# Patient Record
Sex: FEMALE | Race: WHITE | NOT HISPANIC OR LATINO | Employment: OTHER | URBAN - METROPOLITAN AREA
[De-identification: names, ages, dates, MRNs, and addresses within clinical notes are randomized per-mention and may not be internally consistent; named-entity substitution may affect disease eponyms.]

---

## 2017-04-12 ENCOUNTER — OFFICE VISIT - RIVER FALLS (OUTPATIENT)
Dept: FAMILY MEDICINE | Facility: CLINIC | Age: 67
End: 2017-04-12

## 2017-04-12 ASSESSMENT — MIFFLIN-ST. JEOR: SCORE: 2062.73

## 2017-05-24 ENCOUNTER — OFFICE VISIT - RIVER FALLS (OUTPATIENT)
Dept: FAMILY MEDICINE | Facility: CLINIC | Age: 67
End: 2017-05-24

## 2017-05-24 ASSESSMENT — MIFFLIN-ST. JEOR: SCORE: 2027.35

## 2017-06-27 ENCOUNTER — OFFICE VISIT - RIVER FALLS (OUTPATIENT)
Dept: FAMILY MEDICINE | Facility: CLINIC | Age: 67
End: 2017-06-27

## 2017-06-27 ASSESSMENT — MIFFLIN-ST. JEOR: SCORE: 2037.32

## 2017-10-12 ENCOUNTER — OFFICE VISIT - RIVER FALLS (OUTPATIENT)
Dept: FAMILY MEDICINE | Facility: CLINIC | Age: 67
End: 2017-10-12

## 2017-10-12 ASSESSMENT — MIFFLIN-ST. JEOR: SCORE: 2040.95

## 2017-10-13 LAB
CREAT SERPL-MCNC: 0.73 MG/DL (ref 0.5–0.99)
GLUCOSE BLD-MCNC: 130 MG/DL (ref 65–99)
HBA1C MFR BLD: 6.2 %

## 2018-03-12 ENCOUNTER — OFFICE VISIT - RIVER FALLS (OUTPATIENT)
Dept: FAMILY MEDICINE | Facility: CLINIC | Age: 68
End: 2018-03-12

## 2018-03-12 ASSESSMENT — MIFFLIN-ST. JEOR: SCORE: 2040.95

## 2018-04-11 ENCOUNTER — OFFICE VISIT - RIVER FALLS (OUTPATIENT)
Dept: FAMILY MEDICINE | Facility: CLINIC | Age: 68
End: 2018-04-11

## 2018-04-11 ASSESSMENT — MIFFLIN-ST. JEOR: SCORE: 2065.45

## 2018-04-12 LAB
CHOLEST SERPL-MCNC: 177 MG/DL
CHOLEST/HDLC SERPL: 4 {RATIO}
CREAT SERPL-MCNC: 0.76 MG/DL (ref 0.5–0.99)
GLUCOSE BLD-MCNC: 148 MG/DL (ref 65–99)
HBA1C MFR BLD: 6.5 %
HDLC SERPL-MCNC: 44 MG/DL
LDLC SERPL CALC-MCNC: 101 MG/DL
NONHDLC SERPL-MCNC: 133 MG/DL
TRIGL SERPL-MCNC: 202 MG/DL

## 2018-10-29 ENCOUNTER — OFFICE VISIT - RIVER FALLS (OUTPATIENT)
Dept: FAMILY MEDICINE | Facility: CLINIC | Age: 68
End: 2018-10-29

## 2018-10-29 ASSESSMENT — MIFFLIN-ST. JEOR: SCORE: 2069.08

## 2018-10-30 LAB
CHOLEST SERPL-MCNC: 180 MG/DL
CHOLEST/HDLC SERPL: 4.3 {RATIO}
CREAT SERPL-MCNC: 0.72 MG/DL (ref 0.6–0.93)
GLUCOSE BLD-MCNC: 152 MG/DL (ref 65–99)
HBA1C MFR BLD: 6.6 %
HDLC SERPL-MCNC: 42 MG/DL
LDLC SERPL CALC-MCNC: 104 MG/DL
NONHDLC SERPL-MCNC: 138 MG/DL
TRIGL SERPL-MCNC: 226 MG/DL

## 2019-05-14 ENCOUNTER — COMMUNICATION - RIVER FALLS (OUTPATIENT)
Dept: FAMILY MEDICINE | Facility: CLINIC | Age: 69
End: 2019-05-14

## 2019-05-29 ENCOUNTER — OFFICE VISIT - RIVER FALLS (OUTPATIENT)
Dept: FAMILY MEDICINE | Facility: CLINIC | Age: 69
End: 2019-05-29

## 2019-05-29 ASSESSMENT — MIFFLIN-ST. JEOR: SCORE: 2091.76

## 2019-05-30 ENCOUNTER — COMMUNICATION - RIVER FALLS (OUTPATIENT)
Dept: FAMILY MEDICINE | Facility: CLINIC | Age: 69
End: 2019-05-30

## 2019-05-30 LAB
A/G RATIO - HISTORICAL: 1.6 (ref 1–2.5)
ALBUMIN SERPL-MCNC: 4.2 GM/DL (ref 3.6–5.1)
ALP SERPL-CCNC: 125 UNIT/L (ref 33–130)
ALT SERPL W P-5'-P-CCNC: 16 UNIT/L (ref 6–29)
AST SERPL W P-5'-P-CCNC: 19 UNIT/L (ref 10–35)
BILIRUB SERPL-MCNC: 0.7 MG/DL (ref 0.2–1.2)
BUN SERPL-MCNC: 15 MG/DL (ref 7–25)
BUN/CREAT RATIO - HISTORICAL: ABNORMAL (ref 6–22)
CALCIUM SERPL-MCNC: 9.4 MG/DL (ref 8.6–10.4)
CHLORIDE BLD-SCNC: 100 MMOL/L (ref 98–110)
CHOLEST SERPL-MCNC: 171 MG/DL
CHOLEST/HDLC SERPL: 3.7 {RATIO}
CO2 SERPL-SCNC: 28 MMOL/L (ref 20–32)
CREAT SERPL-MCNC: 0.71 MG/DL (ref 0.6–0.93)
EGFRCR SERPLBLD CKD-EPI 2021: 86 ML/MIN/1.73M2
ERYTHROCYTE [DISTWIDTH] IN BLOOD BY AUTOMATED COUNT: 12.3 % (ref 11–15)
GLOBULIN: 2.7 (ref 1.9–3.7)
GLUCOSE BLD-MCNC: 158 MG/DL (ref 65–99)
HBA1C MFR BLD: 7 %
HCT VFR BLD AUTO: 43.1 % (ref 35–45)
HDLC SERPL-MCNC: 46 MG/DL
HGB BLD-MCNC: 15 GM/DL (ref 11.7–15.5)
LDLC SERPL CALC-MCNC: 98 MG/DL
MCH RBC QN AUTO: 31.7 PG (ref 27–33)
MCHC RBC AUTO-ENTMCNC: 34.8 GM/DL (ref 32–36)
MCV RBC AUTO: 91.1 FL (ref 80–100)
NONHDLC SERPL-MCNC: 125 MG/DL
PLATELET # BLD AUTO: 272 10*3/UL (ref 140–400)
PMV BLD: 10 FL (ref 7.5–12.5)
POTASSIUM BLD-SCNC: 4.6 MMOL/L (ref 3.5–5.3)
PROT SERPL-MCNC: 6.9 GM/DL (ref 6.1–8.1)
RBC # BLD AUTO: 4.73 10*6/UL (ref 3.8–5.1)
SODIUM SERPL-SCNC: 139 MMOL/L (ref 135–146)
TRIGL SERPL-MCNC: 168 MG/DL
WBC # BLD AUTO: 6.6 10*3/UL (ref 3.8–10.8)

## 2019-05-31 LAB — BACTERIA SPEC CULT: NORMAL

## 2019-06-03 ENCOUNTER — COMMUNICATION - RIVER FALLS (OUTPATIENT)
Dept: FAMILY MEDICINE | Facility: CLINIC | Age: 69
End: 2019-06-03

## 2019-07-03 ENCOUNTER — COMMUNICATION - RIVER FALLS (OUTPATIENT)
Dept: FAMILY MEDICINE | Facility: CLINIC | Age: 69
End: 2019-07-03

## 2019-07-11 ENCOUNTER — OFFICE VISIT - RIVER FALLS (OUTPATIENT)
Dept: FAMILY MEDICINE | Facility: CLINIC | Age: 69
End: 2019-07-11

## 2019-07-11 ASSESSMENT — MIFFLIN-ST. JEOR: SCORE: 2091.76

## 2019-07-16 ENCOUNTER — COMMUNICATION - RIVER FALLS (OUTPATIENT)
Dept: FAMILY MEDICINE | Facility: CLINIC | Age: 69
End: 2019-07-16

## 2019-12-09 ENCOUNTER — COMMUNICATION - RIVER FALLS (OUTPATIENT)
Dept: FAMILY MEDICINE | Facility: CLINIC | Age: 69
End: 2019-12-09

## 2019-12-11 ENCOUNTER — OFFICE VISIT - RIVER FALLS (OUTPATIENT)
Dept: FAMILY MEDICINE | Facility: CLINIC | Age: 69
End: 2019-12-11

## 2019-12-11 ASSESSMENT — MIFFLIN-ST. JEOR: SCORE: 2091.76

## 2019-12-12 ENCOUNTER — COMMUNICATION - RIVER FALLS (OUTPATIENT)
Dept: FAMILY MEDICINE | Facility: CLINIC | Age: 69
End: 2019-12-12

## 2019-12-12 LAB
A/G RATIO - HISTORICAL: 1.6 (ref 1–2.5)
ALBUMIN SERPL-MCNC: 4.1 GM/DL (ref 3.6–5.1)
ALP SERPL-CCNC: 111 UNIT/L (ref 33–130)
ALT SERPL W P-5'-P-CCNC: 18 UNIT/L (ref 6–29)
AST SERPL W P-5'-P-CCNC: 17 UNIT/L (ref 10–35)
BILIRUB SERPL-MCNC: 0.7 MG/DL (ref 0.2–1.2)
BUN SERPL-MCNC: 13 MG/DL (ref 7–25)
BUN/CREAT RATIO - HISTORICAL: ABNORMAL (ref 6–22)
CALCIUM SERPL-MCNC: 9.6 MG/DL (ref 8.6–10.4)
CHLORIDE BLD-SCNC: 102 MMOL/L (ref 98–110)
CHOLEST SERPL-MCNC: 165 MG/DL
CHOLEST/HDLC SERPL: 3.9 {RATIO}
CO2 SERPL-SCNC: 30 MMOL/L (ref 20–32)
CREAT SERPL-MCNC: 0.69 MG/DL (ref 0.6–0.93)
EGFRCR SERPLBLD CKD-EPI 2021: 88 ML/MIN/1.73M2
ERYTHROCYTE [DISTWIDTH] IN BLOOD BY AUTOMATED COUNT: 11.9 % (ref 11–15)
GLOBULIN: 2.6 (ref 1.9–3.7)
GLUCOSE BLD-MCNC: 153 MG/DL (ref 65–99)
HBA1C MFR BLD: 6.7 %
HCT VFR BLD AUTO: 42.6 % (ref 35–45)
HDLC SERPL-MCNC: 42 MG/DL
HGB BLD-MCNC: 14.7 GM/DL (ref 11.7–15.5)
LDLC SERPL CALC-MCNC: 95 MG/DL
MCH RBC QN AUTO: 30.8 PG (ref 27–33)
MCHC RBC AUTO-ENTMCNC: 34.5 GM/DL (ref 32–36)
MCV RBC AUTO: 89.1 FL (ref 80–100)
NONHDLC SERPL-MCNC: 123 MG/DL
PLATELET # BLD AUTO: 286 10*3/UL (ref 140–400)
PMV BLD: 10.6 FL (ref 7.5–12.5)
POTASSIUM BLD-SCNC: 4.7 MMOL/L (ref 3.5–5.3)
PROT SERPL-MCNC: 6.7 GM/DL (ref 6.1–8.1)
RBC # BLD AUTO: 4.78 10*6/UL (ref 3.8–5.1)
SODIUM SERPL-SCNC: 138 MMOL/L (ref 135–146)
TRIGL SERPL-MCNC: 189 MG/DL
WBC # BLD AUTO: 6.4 10*3/UL (ref 3.8–10.8)

## 2020-07-09 ENCOUNTER — OFFICE VISIT - RIVER FALLS (OUTPATIENT)
Dept: FAMILY MEDICINE | Facility: CLINIC | Age: 70
End: 2020-07-09

## 2020-08-12 ENCOUNTER — OFFICE VISIT - RIVER FALLS (OUTPATIENT)
Dept: FAMILY MEDICINE | Facility: CLINIC | Age: 70
End: 2020-08-12

## 2021-01-19 ENCOUNTER — OFFICE VISIT - RIVER FALLS (OUTPATIENT)
Dept: FAMILY MEDICINE | Facility: CLINIC | Age: 71
End: 2021-01-19

## 2021-01-19 ASSESSMENT — MIFFLIN-ST. JEOR: SCORE: 2010.11

## 2021-01-20 ENCOUNTER — COMMUNICATION - RIVER FALLS (OUTPATIENT)
Dept: FAMILY MEDICINE | Facility: CLINIC | Age: 71
End: 2021-01-20

## 2021-01-20 LAB
BUN SERPL-MCNC: 15 MG/DL (ref 7–25)
BUN/CREAT RATIO - HISTORICAL: ABNORMAL (ref 6–22)
CALCIUM SERPL-MCNC: 9.3 MG/DL (ref 8.6–10.4)
CHLORIDE BLD-SCNC: 103 MMOL/L (ref 98–110)
CHOLEST SERPL-MCNC: 146 MG/DL
CHOLEST/HDLC SERPL: 3.3 {RATIO}
CO2 SERPL-SCNC: 27 MMOL/L (ref 20–32)
CREAT SERPL-MCNC: 0.68 MG/DL (ref 0.6–0.93)
CREAT UR-MCNC: 131 MG/DL (ref 20–275)
EGFRCR SERPLBLD CKD-EPI 2021: 87 ML/MIN/1.73M2
ERYTHROCYTE [DISTWIDTH] IN BLOOD BY AUTOMATED COUNT: 12 % (ref 11–15)
GLUCOSE BLD-MCNC: 136 MG/DL (ref 65–99)
HBA1C MFR BLD: 6.2 %
HCT VFR BLD AUTO: 42.4 % (ref 35–45)
HDLC SERPL-MCNC: 44 MG/DL
HGB BLD-MCNC: 14.5 GM/DL (ref 11.7–15.5)
LDLC SERPL CALC-MCNC: 74 MG/DL
MCH RBC QN AUTO: 31 PG (ref 27–33)
MCHC RBC AUTO-ENTMCNC: 34.2 GM/DL (ref 32–36)
MCV RBC AUTO: 90.6 FL (ref 80–100)
MICROALBUMIN UR-MCNC: 5.5 MG/DL
MICROALBUMIN/CREAT UR: 42 MG/G{CREAT}
NONHDLC SERPL-MCNC: 102 MG/DL
PLATELET # BLD AUTO: 319 10*3/UL (ref 140–400)
PMV BLD: 10.2 FL (ref 7.5–12.5)
POTASSIUM BLD-SCNC: 4.3 MMOL/L (ref 3.5–5.3)
RBC # BLD AUTO: 4.68 10*6/UL (ref 3.8–5.1)
SODIUM SERPL-SCNC: 139 MMOL/L (ref 135–146)
TRIGL SERPL-MCNC: 179 MG/DL
WBC # BLD AUTO: 8.8 10*3/UL (ref 3.8–10.8)

## 2021-05-06 ENCOUNTER — COMMUNICATION - RIVER FALLS (OUTPATIENT)
Dept: FAMILY MEDICINE | Facility: CLINIC | Age: 71
End: 2021-05-06

## 2021-08-11 ENCOUNTER — COMMUNICATION - RIVER FALLS (OUTPATIENT)
Dept: FAMILY MEDICINE | Facility: CLINIC | Age: 71
End: 2021-08-11

## 2021-08-16 ENCOUNTER — OFFICE VISIT - RIVER FALLS (OUTPATIENT)
Dept: FAMILY MEDICINE | Facility: CLINIC | Age: 71
End: 2021-08-16

## 2021-08-16 ASSESSMENT — MIFFLIN-ST. JEOR: SCORE: 2001.94

## 2021-08-17 ENCOUNTER — COMMUNICATION - RIVER FALLS (OUTPATIENT)
Dept: FAMILY MEDICINE | Facility: CLINIC | Age: 71
End: 2021-08-17

## 2021-08-17 LAB — HBA1C MFR BLD: 6.3 %

## 2021-08-30 ENCOUNTER — OFFICE VISIT - RIVER FALLS (OUTPATIENT)
Dept: FAMILY MEDICINE | Facility: CLINIC | Age: 71
End: 2021-08-30

## 2021-08-30 ASSESSMENT — MIFFLIN-ST. JEOR: SCORE: 2011.47

## 2021-08-31 ENCOUNTER — COMMUNICATION - RIVER FALLS (OUTPATIENT)
Dept: FAMILY MEDICINE | Facility: CLINIC | Age: 71
End: 2021-08-31

## 2021-08-31 LAB
TSH SERPL DL<=0.005 MIU/L-ACNC: 1.22 MIU/L (ref 0.4–4.5)
VIT B12 SERPL-MCNC: 202 PG/ML (ref 200–1100)

## 2021-09-02 ENCOUNTER — COMMUNICATION - RIVER FALLS (OUTPATIENT)
Dept: FAMILY MEDICINE | Facility: CLINIC | Age: 71
End: 2021-09-02

## 2022-02-11 VITALS
SYSTOLIC BLOOD PRESSURE: 128 MMHG | HEIGHT: 72 IN | OXYGEN SATURATION: 96 % | TEMPERATURE: 97.7 F | HEART RATE: 68 BPM | DIASTOLIC BLOOD PRESSURE: 78 MMHG | WEIGHT: 293 LBS | BODY MASS INDEX: 39.68 KG/M2

## 2022-02-11 VITALS
OXYGEN SATURATION: 96 % | BODY MASS INDEX: 39.68 KG/M2 | DIASTOLIC BLOOD PRESSURE: 74 MMHG | WEIGHT: 293 LBS | HEART RATE: 76 BPM | OXYGEN SATURATION: 97 % | WEIGHT: 293 LBS | SYSTOLIC BLOOD PRESSURE: 128 MMHG | HEART RATE: 71 BPM | SYSTOLIC BLOOD PRESSURE: 130 MMHG | HEIGHT: 72 IN | BODY MASS INDEX: 39.68 KG/M2 | HEIGHT: 72 IN | DIASTOLIC BLOOD PRESSURE: 82 MMHG

## 2022-02-11 VITALS
BODY MASS INDEX: 39.68 KG/M2 | DIASTOLIC BLOOD PRESSURE: 82 MMHG | HEIGHT: 72 IN | WEIGHT: 293 LBS | SYSTOLIC BLOOD PRESSURE: 144 MMHG | BODY MASS INDEX: 39.68 KG/M2 | WEIGHT: 293 LBS | DIASTOLIC BLOOD PRESSURE: 80 MMHG | HEART RATE: 76 BPM | HEIGHT: 72 IN | HEART RATE: 80 BPM | SYSTOLIC BLOOD PRESSURE: 138 MMHG

## 2022-02-11 VITALS
DIASTOLIC BLOOD PRESSURE: 62 MMHG | HEART RATE: 73 BPM | TEMPERATURE: 98.3 F | WEIGHT: 293 LBS | HEIGHT: 72 IN | OXYGEN SATURATION: 97 % | OXYGEN SATURATION: 94 % | SYSTOLIC BLOOD PRESSURE: 124 MMHG | DIASTOLIC BLOOD PRESSURE: 80 MMHG | BODY MASS INDEX: 39.68 KG/M2 | BODY MASS INDEX: 39.68 KG/M2 | SYSTOLIC BLOOD PRESSURE: 156 MMHG | HEART RATE: 59 BPM | TEMPERATURE: 97.1 F | WEIGHT: 293 LBS | HEIGHT: 72 IN

## 2022-02-11 VITALS
SYSTOLIC BLOOD PRESSURE: 124 MMHG | OXYGEN SATURATION: 97 % | HEIGHT: 72 IN | OXYGEN SATURATION: 97 % | DIASTOLIC BLOOD PRESSURE: 78 MMHG | WEIGHT: 293 LBS | HEIGHT: 72 IN | BODY MASS INDEX: 39.68 KG/M2 | DIASTOLIC BLOOD PRESSURE: 72 MMHG | HEART RATE: 76 BPM | HEART RATE: 72 BPM | WEIGHT: 293 LBS | SYSTOLIC BLOOD PRESSURE: 130 MMHG | BODY MASS INDEX: 39.68 KG/M2

## 2022-02-11 VITALS
WEIGHT: 293 LBS | HEART RATE: 67 BPM | OXYGEN SATURATION: 98 % | DIASTOLIC BLOOD PRESSURE: 80 MMHG | BODY MASS INDEX: 39.68 KG/M2 | SYSTOLIC BLOOD PRESSURE: 124 MMHG | HEIGHT: 72 IN

## 2022-02-11 VITALS
OXYGEN SATURATION: 94 % | BODY MASS INDEX: 39.68 KG/M2 | WEIGHT: 293 LBS | HEIGHT: 72 IN | HEART RATE: 91 BPM | SYSTOLIC BLOOD PRESSURE: 124 MMHG | DIASTOLIC BLOOD PRESSURE: 72 MMHG

## 2022-02-11 VITALS
HEART RATE: 64 BPM | HEIGHT: 72 IN | BODY MASS INDEX: 39.68 KG/M2 | WEIGHT: 293 LBS | RESPIRATION RATE: 16 BRPM | SYSTOLIC BLOOD PRESSURE: 130 MMHG | DIASTOLIC BLOOD PRESSURE: 64 MMHG | OXYGEN SATURATION: 95 % | TEMPERATURE: 97.9 F

## 2022-02-11 VITALS
SYSTOLIC BLOOD PRESSURE: 120 MMHG | WEIGHT: 293 LBS | DIASTOLIC BLOOD PRESSURE: 78 MMHG | HEART RATE: 81 BPM | BODY MASS INDEX: 39.68 KG/M2 | HEIGHT: 72 IN | OXYGEN SATURATION: 96 %

## 2022-02-11 VITALS
WEIGHT: 293 LBS | BODY MASS INDEX: 41.79 KG/M2 | SYSTOLIC BLOOD PRESSURE: 128 MMHG | DIASTOLIC BLOOD PRESSURE: 68 MMHG | HEART RATE: 80 BPM | TEMPERATURE: 98.1 F | OXYGEN SATURATION: 97 %

## 2022-02-16 NOTE — NURSING NOTE
Comprehensive Intake Entered On:  5/29/2019 9:28 AM CDT    Performed On:  5/29/2019 9:24 AM CDT by Ting Lopez CMA               Summary   Chief Complaint :   Pt here for med ck   Weight Measured :   326 lb(Converted to: 326 lb 0 oz, 147.87 kg)    Height Measured :   71.75 in(Converted to: 6 ft 0 in, 182.24 cm)    Body Mass Index :   44.52 kg/m2 (HI)    Body Surface Area :   2.73 m2   Systolic Blood Pressure :   124 mmHg   Diastolic Blood Pressure :   72 mmHg   Mean Arterial Pressure :   89 mmHg   Peripheral Pulse Rate :   72 bpm   Oxygen Saturation :   97 %   Ting Lopez CMA - 5/29/2019 9:24 AM CDT   Health Status   Allergies Verified? :   Yes   Medication History Verified? :   Yes   Medical History Verified? :   Yes   Pre-Visit Planning Status :   Completed   Tobacco Use? :   Former smoker   Ting Lopez CMA - 5/29/2019 9:24 AM CDT   Consents   Consent for Immunization Exchange :   Consent Granted   Consent for Immunizations to Providers :   Consent Granted   Ting Lopez CMA - 5/29/2019 9:24 AM CDT   Meds / Allergies   (As Of: 5/29/2019 9:28:32 AM CDT)   Allergies (Active)   No Known Medication Allergies  Estimated Onset Date:   Unspecified ; Created By:   Charlotte Loza CMA; Reaction Status:   Active ; Category:   Drug ; Substance:   No Known Medication Allergies ; Type:   Allergy ; Updated By:   Charlotte Loza CMA; Reviewed Date:   10/29/2018 10:29 AM CDT        Medication List   (As Of: 5/29/2019 9:28:32 AM CDT)   Prescription/Discharge Order    aspirin  :   aspirin ; Status:   Prescribed ; Ordered As Mnemonic:   aspirin 81 mg oral delayed release tablet ; Simple Display Line:   81 mg, 1 tab(s), PO, Daily, 90 tab(s), 1 Refill(s) ; Ordering Provider:   Raphael Molina MD; Catalog Code:   aspirin ; Order Dt/Tm:   10/29/2018 10:52:02 AM          atorvastatin  :   atorvastatin ; Status:   Prescribed ; Ordered As Mnemonic:   atorvastatin 20 mg oral tablet ; Simple Display Line:   20 mg, 1 tab(s), PO,  Daily, 90 tab(s), 1 Refill(s) ; Ordering Provider:   Raphael Molina MD; Catalog Code:   atorvastatin ; Order Dt/Tm:   10/29/2018 10:50:20 AM          celecoxib  :   celecoxib ; Status:   Prescribed ; Ordered As Mnemonic:   CeleBREX 200 mg oral capsule ; Simple Display Line:   200 mg, 1 cap(s), PO, Daily, 90 cap(s), 1 Refill(s) ; Ordering Provider:   Raphael Molina MD; Catalog Code:   celecoxib ; Order Dt/Tm:   10/29/2018 10:52:49 AM          citalopram  :   citalopram ; Status:   Prescribed ; Ordered As Mnemonic:   citalopram 40 mg oral tablet ; Simple Display Line:   40 mg, 1 tab(s), PO, Daily, 90 tab(s), 1 Refill(s) ; Ordering Provider:   Raphael Molina MD; Catalog Code:   citalopram ; Order Dt/Tm:   10/29/2018 10:50:35 AM          gabapentin  :   gabapentin ; Status:   Prescribed ; Ordered As Mnemonic:   gabapentin 100 mg oral capsule ; Simple Display Line:   100 mg, 1 cap(s), PO, bid, 180 cap(s), 1 Refill(s) ; Ordering Provider:   Raphael Molina MD; Catalog Code:   gabapentin ; Order Dt/Tm:   10/29/2018 10:50:02 AM          hydrochlorothiazide-losartan  :   hydrochlorothiazide-losartan ; Status:   Prescribed ; Ordered As Mnemonic:   hydrochlorothiazide-losartan 12.5 mg-100 mg oral tablet ; Simple Display Line:   1 tab(s), po, daily, 90 tab(s), 1 Refill(s) ; Ordering Provider:   Raphael Molina MD; Catalog Code:   hydrochlorothiazide-losartan ; Order Dt/Tm:   10/29/2018 10:51:07 AM          metFORMIN  :   metFORMIN ; Status:   Prescribed ; Ordered As Mnemonic:   metFORMIN 500 mg oral tablet ; Simple Display Line:   500 mg, 1 tab(s), PO, BID, 180 tab(s), 1 Refill(s) ; Ordering Provider:   Raphael Molina MD; Catalog Code:   metFORMIN ; Order Dt/Tm:   10/29/2018 10:50:49 AM          risperiDONE  :   risperiDONE ; Status:   Prescribed ; Ordered As Mnemonic:   risperiDONE 1 mg oral tablet ; Simple Display Line:   1.5 tab(s), Oral, daily, 45 EA, 0 Refill(s) ; Ordering Provider:   Raphael Molina MD; Catalog Code:    risperiDONE ; Order Dt/Tm:   5/14/2019 7:19:04 AM

## 2022-02-16 NOTE — LETTER
(Inserted Image. Unable to display)       [address] [place] [address]  September 02, 2021      JAN SHIPLEY      [address] [place] [address]        Dear JAN,    Thank you for selecting [facility] for your healthcare needs.  Below you will find the results of your recent tests done at our clinic.     No evidence for B-12 deficiency.      Result Name Current Result Reference Range   Vitamin B12 Level (pg/mL)  202 8/30/2021 200 - 1,100   TSH (mIU/L)  1.22 8/30/2021 0.40 - 4.50   Methylmalonic Acid (nmol/L)  307 8/30/2021 87 - 318       Please contact me or my assistant at [phone] if you have any questions.     Sincerely,        Jameel Faulkner MD

## 2022-02-16 NOTE — TELEPHONE ENCOUNTER
Entered by Ting Lopez CMA on April 08, 2019 12:22:20 PM CDT  ---------------------  From: Ting Lopez CMA   To: [facility]    Sent: 4/8/2019 12:22:20 PM CDT  Subject: Medication Management     ** Submitted: **  Order:risperiDONE (risperiDONE 1 mg oral tablet)  1.5 tab(s)  Oral  daily  Qty:  45 EA        Days Supply:  0        Refills:  0          Substitutions Allowed     Route To [facility]    Signed by Ting Lopez CMA  4/8/2019 12:22:00 PM    ** Submitted: **  Complete:risperiDONE (risperiDONE 1 mg oral tablet)   Signed by Ting Lopez CMA  4/8/2019 12:22:00 PM    ** Not Approved:  **  risperiDONE (RisperiDONE Tablet 1 MG)  Take 1&1/2 tablet by mouth daily  Qty:  45 EA        Days Supply:  0        Refills:  0          Substitutions Allowed     Route To [facility]   Signed by Ting Lopez CMA            ** Patient matched by Ting Lopez CMA on 4/8/2019 12:21:55 PM CDT **      ------------------------------------------  From: [facility]  To: Raphael Molina MD  Sent: April 8, 2019 11:22:00 AM CDT  Subject: Medication Management  Due: April 9, 2019 11:22:00 AM CDT    ** On Hold Pending Signature **  Drug: risperiDONE (risperiDONE 1 mg oral tablet)  Take 1&1/2 tablet by mouth daily  Quantity: 45 EA       Days Supply: 0         Refills: 0  Substitutions Allowed  Notes from Pharmacy:     Dispensed Drug: risperiDONE (risperiDONE 1 mg oral tablet)  Take 1&1/2 tablet by mouth daily  Quantity: 45 EA       Days Supply: 0         Refills: 0  Substitutions Allowed  Notes from Pharmacy:   ------------------------------------------

## 2022-02-16 NOTE — PROGRESS NOTES
Patient:   JAN SHIPLEY            MRN: [id number]            FIN: [id number]               Age:   68 years     Sex:  Female     :  1950   Associated Diagnoses:   Hypertension; Hyperlipidemia NOS; Diabetes Mellitus; Depression, major, recurrent, in complete remission; Primary osteoarthritis; Diabetic Neuropathy; Urinary incontinence   Author:   Raphael Molina MD      Impression and Plan   Diagnosis     Hypertension (GPM85-NV I10).     Course:  Well controlled.    Plan:  discontinue HCTZ due to urinary urge incontinence.    Orders     Orders   Charges (Evaluation and Management):  24981 office outpatient visit 25 minutes (Charge) (Order): Quantity: 1, Urinary incontinence  Depression, major, recurrent, in complete remission  Hypertension  Diabetes Mellitus  Diabetic Neuropathy.     Orders (Selected)   Prescriptions  Prescribed  losartan 100 mg oral tablet: = 1 tab(s) ( 100 mg ), PO, Daily, # 90 tab(s), 1 Refill(s), Type: Maintenance, Pharmacy: [facility], 1 tab(s) Oral daily.     Diagnosis     Hyperlipidemia NOS (CUQ54-AL E78.00).     Course:  Progressing as expected.    Orders     Orders (Selected)   Prescriptions  Prescribed  atorvastatin 20 mg oral tablet: = 1 tab(s) ( 20 mg ), PO, Daily, # 90 tab(s), 1 Refill(s), Type: Maintenance, Pharmacy: [facility], Needs to be seen for more refills., 1 tab(s) Oral daily.     Diagnosis     Diabetes Mellitus (OWC82-YI E11.9).     Course:  Well controlled.    Orders     Orders (Selected)   Prescriptions  Prescribed  metFORMIN 500 mg oral tablet: = 1 tab(s) ( 500 mg ), PO, BID, # 180 tab(s), 1 Refill(s), Type: Maintenance, Pharmacy: [facility], Needs to be seen for more refills., 1 tab(s) Oral bid.     Diagnosis     Depression, major, recurrent, in complete remission (TCU50-PY F33.42).     Course:  Progressing as expected.    Orders     Orders (Selected)   Prescriptions  Prescribed  citalopram 40 mg oral tablet: = 1 tab(s) ( 40 mg ), PO,  Daily, # 90 tab(s), 1 Refill(s), Type: Maintenance, Pharmacy: [facility], Needs to be seen for more refills., 1 tab(s) Oral daily  risperiDONE 1 mg oral tablet: = 1.5 tab(s), Oral, daily, # 135 EA, 1 Refill(s), Type: Maintenance, Pharmacy: [facility], Pt due this month for appt, 1.5 tab(s) Oral daily.     Diagnosis     Primary osteoarthritis (MBG35-AV M19.91).     Course:  Progressing as expected.    Orders     Orders (Selected)   Prescriptions  Prescribed  CeleBREX 200 mg oral capsule: = 1 cap(s) ( 200 mg ), PO, Daily, # 90 cap(s), 1 Refill(s), Type: Maintenance, Pharmacy: [facility], 1 cap(s) Oral daily.     Diagnosis     Diabetic Neuropathy (MNN55-YK E11.40).     Course:  Worsening.    Orders     Orders (Selected)   Prescriptions  Prescribed  gabapentin 100 mg oral capsule: = 2 cap(s) ( 200 mg ), PO, bid, # 360 cap(s), 1 Refill(s), Type: Maintenance, Pharmacy: [facility], Needs to be seen for more refills., 2 cap(s) Oral bid.     Diagnosis     Urinary incontinence (JBM61-DZ R32).     Course:  Worsening.    Plan:    1. Discontinue HCTZ  2. check urine culture  3. Consider pelvic floor exercises with PT  4. Urology consult.       Visit Information      Date of Service: 05/29/2019 09:20 am  Performing Location: [facility]  Encounter#: [id number]      Primary Care Provider (PCP):  Raphael Molina MD# [id number]      Referring Provider:  Raphael Molina MD# [id number]   Visit type:  New symptom.    Accompanied by:  No one.    Source of history:  Self.    History limitation:  None.       Chief Complaint   Chief complaint discussed and confirmed correct.     5/29/2019 9:24 AM CDT    Pt here for med ck        History of Present Illness             The patient presents for follow-up evaluation of diabetes.  The quality of the patient's diabetes is described as being unchanged from previous visit.  Exacerbating factors consist of noncompliance with diet and  sedentary lifestyle.  Relieving factors consist of medication.  Associated symptoms consist of none.  Medical encounters: none.  Compliance problems: with diet and with weight management.  Glucose results: within target range.  Hemoglobin A1c results: < 6%.  Lifestyle modification: weight reduction, diet, increased physical activity.  Medications: See medication list.  Additional pertinent history: last podiatric foot exam: 10/29/2018 and eye exam recommended.  Target A1c: 7.0    Target BS: 125      Fastin      Post prandial:150    Meter BS averages: NA    Hypoglycemia      Frequency:never      Severity:      Awareness:      Treatment:    Microvascular      Eye:neg      Kidney:neg      Neuro:peripheral neuropathy      Autonomic:neg       Macrovascular      CAD:neg      PVD:neg      HLD:pos      HTN:pos        Health Care Maintenance      Aspirin:yes      Pneumovax:yes       Flu vaccine:refused      Foot exam:yes      Tobacco:no    Diet History:SAD    Laboratory      Last A1c:6.5      Last LDL:89      Last creatinine0.73      Last MARIS: 6.               The patient presents for follow-up evaluation of hypertension.  The quality of hypertension symptom(s) since the patient's last visit is described as being unchanged.  The severity of the hypertension symptom(s) since the last visit is moderate.  Since the patient's last visit, the timing/course of hypertension symptom(s) is constant.  Exacerbating factors consist of none.  Relieving factors consist of medication.  Associated symptoms consist of none.  Prior treatment consists of lifestyle modification (weight reduction, dietary sodium restriction, increased physical activity).  Medical encounters: none.  Compliance problems: none.        Interval History   Cholesterol Management   Total cholesterol results < 200 mg/dL (optimal).  LDL cholesterol results < 100 mg/dL (optimal).  HDL cholesterol results 40-60 mg/dl.  Triglyceride results < 150 mg/dL (normal).   The course is progressing as expected.  Compliance problems: none.  The effect on daily activities is no change in activity level and no change in eating habits.  Associated symptoms characterized by no fatigue, chest pain, joint pain, muscle weakness or myalgias.        Review of Systems   Constitutional:  Negative.    Eye:  Negative.    Ear/Nose/Mouth/Throat:  Negative.    Respiratory:  Negative.    Cardiovascular:  Negative.    Gastrointestinal:  Fecal incontinence.    Genitourinary:  Urinary incontinence.    Hematology/Lymphatics:  Negative.    Endocrine:  Negative.    Immunologic:  Negative.    Musculoskeletal:  Joint pain.    Integumentary:  Negative.    Neurologic:  Numbness, Tingling.    Psychiatric:  Negative.    All other systems reviewed and negative      Health Status   Allergies:    Allergic Reactions (Selected)  No Known Medication Allergies   Medications:  (Selected)   Prescriptions  Prescribed  CeleBREX 200 mg oral capsule: = 1 cap(s) ( 200 mg ), PO, Daily, # 90 cap(s), 1 Refill(s), Type: Maintenance, Pharmacy: [facility], 1 cap(s) Oral daily  aspirin 81 mg oral delayed release tablet: = 1 tab(s) ( 81 mg ), PO, Daily, # 90 tab(s), 1 Refill(s), Type: Maintenance, Pharmacy: [facility], 1 tab(s) Oral daily  atorvastatin 20 mg oral tablet: = 1 tab(s) ( 20 mg ), PO, Daily, # 90 tab(s), 1 Refill(s), Type: Maintenance, Pharmacy: [facility], Needs to be seen for more refills., 1 tab(s) Oral daily  citalopram 40 mg oral tablet: = 1 tab(s) ( 40 mg ), PO, Daily, # 90 tab(s), 1 Refill(s), Type: Maintenance, Pharmacy: [facility], Needs to be seen for more refills., 1 tab(s) Oral daily  gabapentin 100 mg oral capsule: = 2 cap(s) ( 200 mg ), PO, bid, # 360 cap(s), 1 Refill(s), Type: Maintenance, Pharmacy: [facility], Needs to be seen for more refills., 2 cap(s) Oral bid  losartan 100 mg oral tablet: = 1 tab(s) ( 100 mg ), PO, Daily, # 90 tab(s), 1 Refill(s), Type:  Maintenance, Pharmacy: [facility], 1 tab(s) Oral daily  metFORMIN 500 mg oral tablet: = 1 tab(s) ( 500 mg ), PO, BID, # 180 tab(s), 1 Refill(s), Type: Maintenance, Pharmacy: [facility], Needs to be seen for more refills., 1 tab(s) Oral bid  risperiDONE 1 mg oral tablet: = 1.5 tab(s), Oral, daily, # 135 EA, 1 Refill(s), Type: Maintenance, Pharmacy: [facility], Pt due this month for appt, 1.5 tab(s) Oral daily   Problem list:    All Problems  Colon cancer / SNOMED CT 801999800 / Confirmed  Depression, major, recurrent, in complete remission / ICD-9-.36 / Confirmed  Diabetes Mellitus / ICD-9- / Confirmed  Diabetic Neuropathy / ICD-9-.60 / Confirmed  Hyperlipidemia NOS / SNOMED CT 085627885 / Confirmed  Hypertension / ICD-9-.91 / Confirmed  Long term current use of oral hypoglycemic drug / SNOMED CT 124494602 / Confirmed  Morbid Obesity / ICD-9-.01 / Probable  Primary osteoarthritis / SNOMED CT 387271738 / Confirmed      Histories   Past Medical History:    Active  Morbid Obesity (278.01)  Hyperlipidemia NOS (087022101)  Hypertension (997.91)  Diabetes Mellitus (250)  Depression, major, recurrent, in complete remission (296.36)  Diabetic Neuropathy (250.60)  Colon cancer (108194896)   Family History:    Diabetes mellitus  Father  Brother  Grandmother (P)  Hypertension  Grandmother (M)  Heart disease  Grandfather (P)  Father  Grandmother (M)  Substance abuse  Father     Procedure history:    Colonoscopy (SNOMED CT 227554085) performed by Leela Crooks MD on 5/13/2016 at 65 Years.  Comments:  5/26/2016 8:02 AM CDT - Moon Alicia  Indication:  Follow up previous lesion.  Sedation:  MAC  Impression:  Diverticulosis of lg int w/o perforation or abscess with bl.  Personal history of malignant neoplasm of large in.  Benign neoplasm of ascending colon.  Colonoscopy (SNOMED CT 428111768) performed by Leela Crooks MD on 10/23/2015 at 65 Years.  Comments:  11/12/2015 8:43 AM  Moon Cortes  Indication:  History of colon cancer.  Sedation: MAC  sigmoid colon resection in 2014 at 64 Years.  gall bladder removed.  tubes tied.  appendectomy.  benign lump from back of neck removed.   x 3.   Social History:        Alcohol Assessment: Denies Alcohol Use            Never      Tobacco Assessment: Past            Past      Substance Abuse Assessment            Never      Employment and Education Assessment            Retired      Home and Environment Assessment            Marital status: .  Lives with Self.  3 children.  Living situation: Home/Independent.      Physical Examination   Vital signs reviewed  and within acceptable limits    Vital Signs   2019 9:24 AM CDT Peripheral Pulse Rate 72 bpm    Systolic Blood Pressure 124 mmHg    Diastolic Blood Pressure 72 mmHg    Mean Arterial Pressure 89 mmHg    Oxygen Saturation 97 %      Measurements from flowsheet : Measurements   2019 9:24 AM CDT Height Measured - Standard 71.75 in    Weight Measured - Standard 326 lb    BSA 2.73 m2    Body Mass Index 44.52 kg/m2  HI      PHQ 9 score: 3   General:  Alert and oriented, No acute distress.    HENT:  Normocephalic.    Neck:  Supple, No lymphadenopathy, No thyromegaly.    Respiratory:  Lungs are clear to auscultation, Respirations are non-labored, Breath sounds are equal, Symmetrical chest wall expansion.    Cardiovascular:  Normal rate, Regular rhythm, No murmur, No gallop, Good pulses equal in all extremities, Normal peripheral perfusion, No edema.    Gastrointestinal:  Soft, Non-tender, Non-distended, Normal bowel sounds, No organomegaly.    Musculoskeletal:  Normal range of motion, No swelling, No deformity, Normal gait.    Integumentary:  Warm, Dry, Pink, No foot ulcers or skin lesions..    Feet:  Normal by visual exam, Sensation intact, By monofilament exam.    Neurologic:  Alert, Oriented, Normal sensory, Normal motor function, No focal deficits.    Psychiatric:   Cooperative, Appropriate mood & affect.

## 2022-02-16 NOTE — PROGRESS NOTES
Patient:   JAN SHIPLEY            MRN: [id number]            FIN: [id number]               Age:   67 years     Sex:  Female     :  1950   Associated Diagnoses:   Depression, major, recurrent, in complete remission; Diabetes Mellitus; Diabetic Neuropathy; Hyperlipidemia NOS; Hypertension   Author:   Raphael Hsu PA-C      Chief Complaint   10/12/2017 10:35 AM CDT  Pt here for med ck        History of Present Illness   Chief complaint and symptoms noted above and confirmed with patient   she has a hx of DM, hyperlipidemia and HTN  last HgbA1c in April was 6.8  lipids in April were well controlled on atrovastatin         Review of Systems   Constitutional:  Negative.    Ear/Nose/Mouth/Throat:  Negative.    Respiratory:  Negative.    Cardiovascular:  Negative.    Gastrointestinal:  Negative.    Musculoskeletal:  Joint pain.       Health Status   Allergies:    Allergic Reactions (Selected)  No Known Medication Allergies   Medications:  (Selected)   Prescriptions  Prescribed  aspirin 325 mg oral tablet: 1 tab(s) ( 325 mg ), po, daily, # 30 tab(s), 0 Refill(s), Type: Maintenance, Pharmacy: [facility], 1 tab(s) po daily  atorvastatin 20 mg oral tablet: 1 tab(s) ( 20 mg ), PO, Daily, # 90 tab(s), 1 Refill(s), Type: Maintenance, Pharmacy: [facility], 1 tab(s) po daily  citalopram 40 mg oral tablet: 1 tab(s) ( 40 mg ), PO, Daily, # 90 tab(s), 1 Refill(s), Type: Maintenance, Pharmacy: [facility], 1 tab(s) po daily  gabapentin 100 mg oral capsule: 1 cap(s) ( 100 mg ), PO, daily, # 90 cap(s), 1 Refill(s), Type: Maintenance, Pharmacy: [facility], 1 cap(s) po daily  hydroCHLOROthiazide-losartan 12.5 mg-100 mg oral tablet: 1 tab(s), po, daily, # 90 tab(s), 1 Refill(s), Type: Maintenance, Pharmacy: [facility], 1 tab(s) po daily  metFORMIN 500 mg oral tablet: 1 tab(s) ( 500 mg ), PO, BID, # 180 tab(s), 1 Refill(s), Type: Maintenance, Pharmacy: [facility], 1 tab(s) po  bid  risperiDONE 1 mg oral tablet: 1.5 tab(s) ( 1.5 mg ), po, daily, # 135 tab(s), 1 Refill(s), Type: Maintenance, Pharmacy: [facility], 1.5 tab(s) po daily  Documented Medications  Documented  vitamin D: vitamin D, Supply, 0 Refill(s), Type: Maintenance   Problem list:    All Problems  Diabetes Mellitus / ICD-9- / Confirmed  Diabetic Neuropathy / ICD-9-.60 / Confirmed  Morbid Obesity / ICD-9-.01 / Probable  Depression, major, recurrent, in complete remission / ICD-9-.36 / Confirmed  Hyperlipidemia NOS / SNOMED CT 179205083 / Confirmed  Colon cancer / SNOMED CT 271358254 / Confirmed  Hypertension / ICD-9-.91 / Confirmed      Histories   Past Medical History:    Active  Morbid Obesity (278.01)  Hyperlipidemia NOS (286436955)  Hypertension (997.91)  Diabetes Mellitus (250)  Depression, major, recurrent, in complete remission (296.36)  Diabetic Neuropathy (250.60)  Colon cancer (643678520)   Family History:    Diabetes mellitus  Father  Brother  Grandmother (P)  Hypertension  Grandmother (M)  Heart disease  Grandfather (P)  Father  Grandmother (M)  Substance abuse  Father     Procedure history:    Colonoscopy (210479979) on 2016 at 65 Years.  Comments:  2016 8:02 AM - Moon Alicia  Indication:  Follow up previous lesion.  Sedation:  MAC  Impression:  Diverticulosis of lg int w/o perforation or abscess with bl.  Personal history of malignant neoplasm of large in.  Benign neoplasm of ascending colon.  Colonoscopy ([id number]) on 10/23/2015 at 65 Years.  Comments:  2015 8:43 AM - Moon Alicia  Indication:  History of colon cancer.  Sedation: MAC  sigmoid colon resection in  at 64 Years.  gall bladder removed.  tubes tied.  appendectomy.  benign lump from back of neck removed.   x 3.   Social History:        Alcohol Assessment: Denies Alcohol Use            Never      Tobacco Assessment: Past            Past      Substance Abuse Assessment            Never       Employment and Education Assessment            Retired      Home and Environment Assessment            Marital status: .  Lives with Self.  3 children.  Living situation: Home/Independent.        Physical Examination   Vital Signs   10/12/2017 10:35 AM CDT Temperature Tympanic 97.7 DegF  LOW    Peripheral Pulse Rate 68 bpm    Pulse Site Radial artery    Systolic Blood Pressure 128 mmHg    Diastolic Blood Pressure 78 mmHg    Mean Arterial Pressure 95 mmHg    BP Site Right arm    Oxygen Saturation 96 %      Measurements from flowsheet : Measurements   10/12/2017 10:35 AM CDT Height Measured - Standard 71.75 in    Weight Measured - Standard 314.8 lb    BSA 2.69 m2    Body Mass Index 42.99 kg/m2      General:  No acute distress.    HENT:  Tympanic membranes are clear, No pharyngeal erythema, No sinus tenderness.    Neck:  Supple, Non-tender, No lymphadenopathy.    Respiratory:  Lungs are clear to auscultation.    Cardiovascular:  Normal rate, Regular rhythm, No murmur.    Feet:  Normal by visual exam, Normal pulses, Sensation intact, By monofilament exam.    Cognition and Speech:  Oriented, Speech clear and coherent.    Psychiatric:  Appropriate mood & affect.       Impression and Plan   Diagnosis     Depression, major, recurrent, in complete remission (LCP88-HY F33.42).     Diabetes Mellitus (MYM26-HU E11.9).     Diabetic Neuropathy (IVK06-RB E11.40).     Hyperlipidemia NOS (YJA48-DP E78.0).     Hypertension (PEJ65-WF I10).     Course:  Well controlled.    Summary:  will renew her meds and check labs,  probably also has some OA in her knees, advised to use tylenol prn knee pain  we will also increase her gabapentin to twice a day, in  the future may increase to tid if needed.    Orders     Orders   Pharmacy:  risperiDONE 1 mg oral tablet (Prescribe): 1.5 tab(s) ( 1.5 mg ), po, daily, # 135 tab(s), 1 Refill(s), Type: Maintenance, Pharmacy: [facility], 1.5 tab(s) po daily  hydrochlorothiazide-losartan 12.5  mg-100 mg oral tablet (Prescribe): 1 tab(s), po, daily, # 90 tab(s), 1 Refill(s), Type: Maintenance, Pharmacy: [facility], 1 tab(s) po daily  metFORMIN 500 mg oral tablet (Prescribe): 1 tab(s) ( 500 mg ), PO, BID, # 180 tab(s), 1 Refill(s), Type: Maintenance, Pharmacy: [facility], 1 tab(s) po bid  citalopram 40 mg oral tablet (Prescribe): 1 tab(s) ( 40 mg ), PO, Daily, # 90 tab(s), 1 Refill(s), Type: Maintenance, Pharmacy: [facility], 1 tab(s) po daily  atorvastatin 20 mg oral tablet (Prescribe): 1 tab(s) ( 20 mg ), PO, Daily, # 90 tab(s), 1 Refill(s), Type: Maintenance, Pharmacy: [facility], 1 tab(s) po daily  gabapentin 100 mg oral capsule (Prescribe): 1 cap(s) ( 100 mg ), PO, daily, # 90 cap(s), 1 Refill(s), Type: Maintenance, Pharmacy: [facility], 1 cap(s) po daily  hydroCHLOROthiazide-losartan 12.5 mg-100 mg oral tablet (Modify): 1 tab(s), po, daily, # 90 tab(s), 1 Refill(s), Type: Hard Stop, Pharmacy: [facility]  metFORMIN 500 mg oral tablet (Modify): 1 tab(s) ( 500 mg ), PO, BID, # 180 tab(s), 1 Refill(s), Type: Hard Stop, Pharmacy: [facility]  citalopram 40 mg oral tablet (Modify): 1 tab(s) ( 40 mg ), PO, Daily, # 90 tab(s), 1 Refill(s), Type: Hard Stop, Pharmacy: [facility]  atorvastatin 20 mg oral tablet (Modify): 1 tab(s) ( 20 mg ), PO, Daily, # 90 tab(s), 1 Refill(s), Type: Hard Stop, Pharmacy: [facility]  gabapentin 100 mg oral capsule (Modify): 1 cap(s) ( 100 mg ), PO, daily, # 90 cap(s), 1 Refill(s), Type: Hard Stop, Pharmacy: [facility]  risperiDONE 1 mg oral tablet (Modify): 1.5 tab(s) ( 1.5 mg ), po, daily, # 135 tab(s), 1 Refill(s), Type: Hard Stop, Pharmacy: [facility].     Orders   Lab (Gen Lab  Reference Lab):  Microalbumin, random urine (w/creatinine)* ([facility]) (Order): Specimen Type: Urine, Collection Date: 10/12/2017 11:06 AM CDT  Hemoglobin A1c* ([facility]) (Order): Specimen Type: Blood, Collection Date:  10/12/2017 11:06 AM CDT  Basic Metabolic Panel* ([facility]) (Order): Specimen Type: Serum, Collection Date: 10/12/2017 11:06 AM CDT.     Orders   Pharmacy:  gabapentin 100 mg oral capsule (Prescribe): 1 cap(s) ( 100 mg ), PO, bid, # 180 cap(s), 1 Refill(s), Type: Maintenance, Pharmacy: [facility], 1 cap(s) po bid  gabapentin 100 mg oral capsule (Discontinue).     Orders   Charges (Evaluation and Management):  63729 office outpatient visit 25 minutes (Charge) (Order): Quantity: 1, Diabetic Neuropathy  Hypertension  Diabetes Mellitus  Depression, major, recurrent, in complete remission  Hyperlipidemia NOS.

## 2022-02-16 NOTE — TELEPHONE ENCOUNTER
---------------------  From: Ting Lopez CMA   To: Raphael Molina MD;     Sent: 7/11/2019 1:33:10 PM CDT  Subject: General Message     Pt called back to say that the med that you had given her was Terbinafine Hcl

## 2022-02-16 NOTE — TELEPHONE ENCOUNTER
---------------------  From: Lizet HORTON Alexa (Phone Messages Pool ([id number]_[facility]))   To: Phone Messages Pool ([id number]_[place] [facility] [place]);     Sent: 7/9/2020 2:11:00 PM CDT  Subject: General Message-muscle relaxant     Phone Message    PCP:   seen by [facility]      Time of Call:  1339       Person Calling:  Lv from [facility]  Phone number:  [phone]    Returned call at: _    Note:   Pt had a video visit today with [facility] and was understanding that she would receive a muscle relaxant which wasn't sent in.  All her other meds were refilled, but not a muscle relaxant.    LM for pt to call back to discuss     In fact, after much conversation, it is revealed the pain started shortly after she quit all her meds. She ran out and just didn't bother to get refilled. She was confused about why Dr Molina wasnt in the clinic in [place] anymore.  She has not been on any HTN, DM or pain meds. She has osteoarthritis   She is having no SOB or chest pain, just doesn't feel good. She had also been on gabapentin for pain and 2 meds for depression that she quit abruptly     I asked her to get back on ALL her medication, I will send refills to pharmacy  I asked her to schedule lab only appt in 10 weeks then f/u video appt 2 days later  IF her back pain does not respond in the next week to restart of the Celebrex, she should come to clinic for further eval of other etiology and she is in agreement.      Last office visit and reason:  7/9contacted pt again at 1556 and discussed  went over restarting all her meds as previously ordered - [facility] renewed  not starting anything new, needs to f/u if still having issues in 2wks - pt expressed understanding

## 2022-02-16 NOTE — LETTER
(Inserted Image. Unable to display)            August 17, 2021      JAN SHIPLEY      [address] [place] [address]        Dear JAN,    Thank you for selecting [facility] for your healthcare needs.  Below you will find the results of the recent tests done at our clinic.      Your diabetes test result is excellent.  Continue your current medications. I sent refills to your pharmacy for 6 months.   Set up an appointment with Dr Adebayo Faulkner as we discussed for memory concerns.  Set up an appointment for physical therapy to help with your left shoulder.    Use Tylenol (acetaminophen 1000mg) twice a day for your arthritis in your right hip. Continue to use the Celebrex for daily control of arthritis pain.  See me in 3 months to follow up and make sure things are coming together.      Result Name Current Result Previous Result Reference Range   Hgb A1c ((H)) 6.3 8/16/2021 ((H)) 6.2 1/19/2021  - <5.7       Please contact me or my assistant at [phone] if you have any questions or concerns.     Sincerely,        CATALINO Dunne  Family Nurse Practitioner      What do your labs mean?  Below is a glossary of commonly ordered labs:  LDL   Bad Cholesterol   HDL   Good Cholesterol  AST/ALT   Liver Function   Cr/Creatinine   Kidney Function  Microalbumin   Kidney Function  BUN   Kidney Function  PSA   Prostate    TSH   Thyroid Hormone  HgbA1c   Diabetes Test   Hgb (Hemoglobin)   Red Blood Cells  WBC   White Blood Cell Count

## 2022-02-16 NOTE — LETTER
(Inserted Image. Unable to display)   [address] [address]  December 12, 2019      JAN SHIPLEY  [address]  [place], [place] [id number]        Dear JAN,     Thank you for selecting [facility] for your healthcare needs. Below you will find the results of the recent tests done at our clinic.        All results are within acceptable limits. No treatment changes are recommended at this time.      Result Name Current Result Previous Result Reference Range   Sodium Level (mmol/L)  138 12/11/2019  139 5/29/2019 135 - 146   Potassium Level (mmol/L)  4.7 12/11/2019  4.6 5/29/2019 3.5 - 5.3   Chloride Level (mmol/L)  102 12/11/2019  100 5/29/2019 98 - 110   CO2 Level (mmol/L)  30 12/11/2019  28 5/29/2019 20 - 32   Glucose Level (mg/dL) ((H)) 153 12/11/2019 ((H)) 158 5/29/2019 65 - 99   BUN (mg/dL)  13 12/11/2019  15 5/29/2019 7 - 25   Creatinine Level (mg/dL)  0.69 12/11/2019  0.71 5/29/2019 0.60 - 0.93   BUN/Creat Ratio  NOT APPLICABLE 12/11/2019  NOT APPLICABLE 5/29/2019 6 - 22   eGFR (mL/min/1.73m2)  88 12/11/2019  86 5/29/2019 > OR = 60 -    eGFR  (mL/min/1.73m2)  102 12/11/2019  100 5/29/2019 > OR = 60 -    Calcium Level (mg/dL)  9.6 12/11/2019  9.4 5/29/2019 8.6 - 10.4   Bilirubin Total (mg/dL)  0.7 12/11/2019  0.7 5/29/2019 0.2 - 1.2   Alkaline Phosphatase (unit/L)  111 12/11/2019  125 5/29/2019 33 - 130   AST/SGOT (unit/L)  17 12/11/2019  19 5/29/2019 10 - 35   ALT/SGPT (unit/L)  18 12/11/2019  16 5/29/2019 6 - 29   Protein Total (gm/dL)  6.7 12/11/2019  6.9 5/29/2019 6.1 - 8.1   Albumin Level (gm/dL)  4.1 12/11/2019  4.2 5/29/2019 3.6 - 5.1   Globulin  2.6 12/11/2019  2.7 5/29/2019 1.9 - 3.7   A/G Ratio  1.6 12/11/2019  1.6 5/29/2019 1.0 - 2.5   Hgb A1c ((H)) 6.7 12/11/2019 ((H)) 7.0 5/29/2019  - <5.7   Cholesterol (mg/dL)  165 12/11/2019  171 5/29/2019  - <200   Non-HDL Cholesterol  123 12/11/2019  125 5/29/2019  - <130   HDL (mg/dL) ((L)) 42  12/11/2019 ((L)) 46 5/29/2019 >50 -    Cholesterol/HDL Ratio  3.9 12/11/2019  3.7 5/29/2019  - <5.0   LDL  95 12/11/2019  98 5/29/2019    Triglyceride (mg/dL) ((H)) 189 12/11/2019 ((H)) 168 5/29/2019  - <150   WBC  6.4 12/11/2019  6.6 5/29/2019 3.8 - 10.8   RBC  4.78 12/11/2019  4.73 5/29/2019 3.80 - 5.10   Hgb (gm/dL)  14.7 12/11/2019  15.0 5/29/2019 11.7 - 15.5   Hct (%)  42.6 12/11/2019  43.1 5/29/2019 35.0 - 45.0   MCV (fL)  89.1 12/11/2019  91.1 5/29/2019 80.0 - 100.0   MCH (pg)  30.8 12/11/2019  31.7 5/29/2019 27.0 - 33.0   MCHC (gm/dL)  34.5 12/11/2019  34.8 5/29/2019 32.0 - 36.0   RDW (%)  11.9 12/11/2019  12.3 5/29/2019 11.0 - 15.0   Platelet  286 12/11/2019  272 5/29/2019 140 - 400   MPV (fL)  10.6 12/11/2019  10.0 5/29/2019 7.5 - 12.5       Please contact my practice at [phone]  if you have any questions or concerns.     Sincerely,        Raphael Molina MD          What do your labs mean?  Below is a glossary of commonly ordered labs:  LDL   Bad Cholesterol   HDL   Good Cholesterol  AST/ALT   Liver Function   Cr/Creatinine   Kidney Function  Microalbumin   Kidney Function  BUN   Kidney Function  PSA   Prostate    TSH   Thyroid Hormone  HgbA1c   Diabetes Test   Hgb (Hemoglobin)   Red Blood Cells

## 2022-02-16 NOTE — PROGRESS NOTES
Patient:   JAN SHIPLEY            MRN: [id number]            FIN: [id number]               Age:   67 years     Sex:  Female     :  1950   Associated Diagnoses:   Vertigo   Author:   Raphael Molina MD      Impression and Plan   Diagnosis     Vertigo (FBR17-SU R42).     Course:  transient and now resolved.    Plan:  need to rule out central vertigo due to vascular disease.    Orders     Orders   Charges (Evaluation and Management):  56362 office outpatient visit 25 minutes (Charge) (Order): Quantity: 1, Vertigo.     Orders (Selected)   Outpatient Orders  Ordered  US Carotid Bilateral (Request): Vertigo.        Visit Information      Date of Service: 2018 02:55 pm  Performing Location: [facility]  Encounter#: [id number]      Primary Care Provider (PCP):  Raphael Molina MD    NPI# [id number]      Referring Provider:  Raphael Molina MD# [id number]   Visit type:  New symptom.    Accompanied by:  No one.    Source of history:  Self.    History limitation:  None.       Chief Complaint   Chief complaint discussed and confirmed correct.     3/12/2018 3:21 PM CDT    Pt here for visual disturbance that happened on Friday        History of Present Illness             The patient presents with brief episode of vertigo and what sounds like nystagmus three days ago..        Review of Systems   Constitutional:  No fever, No weakness, No fatigue, No decreased activity.    Eye:  Recent visual problem, Discharge, Visual disturbances, No blurring, No double vision.    Ear/Nose/Mouth/Throat:       Decreased hearing: Bilaterally.    Respiratory:  No shortness of breath, No cough.    Cardiovascular:  No palpitations, No bradycardia, No tachycardia, No peripheral edema, No syncope.    Gastrointestinal:  Negative.    Genitourinary:  No dysuria.    Hematology/Lymphatics:  Negative.    Endocrine:  Negative.    Immunologic:  Negative.    Musculoskeletal:  Negative.    Integumentary:  Negative.     Neurologic:  Negative.    Psychiatric:  No anxiety, No depression.          All other systems reviewed and negative      Health Status   Allergies:    Allergic Reactions (Selected)  No Known Medication Allergies   Medications:  (Selected)   Prescriptions  Prescribed  aspirin 325 mg oral tablet: 1 tab(s) ( 325 mg ), po, daily, # 30 tab(s), 0 Refill(s), Type: Maintenance, Pharmacy: [facility], 1 tab(s) po daily  atorvastatin 20 mg oral tablet: 1 tab(s) ( 20 mg ), PO, Daily, # 90 tab(s), 1 Refill(s), Type: Maintenance, Pharmacy: [facility], 1 tab(s) po daily  citalopram 40 mg oral tablet: 1 tab(s) ( 40 mg ), PO, Daily, # 90 tab(s), 1 Refill(s), Type: Maintenance, Pharmacy: [facility], 1 tab(s) po daily  gabapentin 100 mg oral capsule: 1 cap(s) ( 100 mg ), PO, bid, # 180 cap(s), 1 Refill(s), Type: Maintenance, Pharmacy: [facility], 1 cap(s) po bid  hydrochlorothiazide-losartan 12.5 mg-100 mg oral tablet: 1 tab(s), po, daily, # 90 tab(s), 1 Refill(s), Type: Maintenance, Pharmacy: [facility], 1 tab(s) po daily  metFORMIN 500 mg oral tablet: 1 tab(s) ( 500 mg ), PO, BID, # 180 tab(s), 1 Refill(s), Type: Maintenance, Pharmacy: [facility], 1 tab(s) po bid  risperiDONE 1 mg oral tablet: 1.5 tab(s) ( 1.5 mg ), po, daily, # 135 tab(s), 1 Refill(s), Type: Maintenance, Pharmacy: [facility], 1.5 tab(s) po daily  Documented Medications  Documented  vitamin D: vitamin D, Supply, 0 Refill(s), Type: Maintenance   Problem list:    All Problems  Colon cancer / SNOMED CT 812584393 / Confirmed  Depression, major, recurrent, in complete remission / ICD-9-.36 / Confirmed  Diabetes Mellitus / ICD-9- / Confirmed  Diabetic Neuropathy / ICD-9-.60 / Confirmed  Hyperlipidemia NOS / SNOMED CT 011014126 / Confirmed  Hypertension / ICD-9-.91 / Confirmed  Morbid Obesity / ICD-9-.01 / Probable      Histories   Past Medical History:    Active  Morbid Obesity  (278.01)  Hyperlipidemia NOS (886785954)  Hypertension (997.91)  Diabetes Mellitus (250)  Depression, major, recurrent, in complete remission (296.36)  Diabetic Neuropathy (250.60)  Colon cancer (290118843)   Family History:    Diabetes mellitus  Father  Brother  Grandmother (P)  Hypertension  Grandmother (M)  Heart disease  Grandfather (P)  Father  Grandmother (M)  Substance abuse  Father     Procedure history:    Colonoscopy (SNOMED CT 592118193) performed by Leela Crooks MD on 2016 at 65 Years.  Comments:  2016 8:02 AM - Moon Alicia  Indication:  Follow up previous lesion.  Sedation:  MAC  Impression:  Diverticulosis of lg int w/o perforation or abscess with bl.  Personal history of malignant neoplasm of large in.  Benign neoplasm of ascending colon.  Colonoscopy (SNOMED CT 053736761) performed by Leela Crooks MD on 10/23/2015 at 65 Years.  Comments:  2015 8:43 AM - Moon Alicia  Indication:  History of colon cancer.  Sedation: MAC  sigmoid colon resection in  at 64 Years.  gall bladder removed.  tubes tied.  appendectomy.  benign lump from back of neck removed.   x 3.   Social History:        Alcohol Assessment: Denies Alcohol Use            Never      Tobacco Assessment: Past            Past      Substance Abuse Assessment            Never      Employment and Education Assessment            Retired      Home and Environment Assessment            Marital status: .  Lives with Self.  3 children.  Living situation: Home/Independent.        Physical Examination   Vital signs reviewed  and within acceptable limits    Vital Signs   3/12/2018 3:21 PM CDT Peripheral Pulse Rate 71 bpm    Systolic Blood Pressure 130 mmHg    Diastolic Blood Pressure 82 mmHg  HI    Mean Arterial Pressure 98 mmHg    BP Site Right arm    Oxygen Saturation 96 %      Measurements from flowsheet : Measurements   3/12/2018 3:21 PM CDT Height Measured - Standard 71.75 in    Weight Measured - Standard 314.8 lb     BSA 2.69 m2    Body Mass Index 42.99 kg/m2  HI      General:  No acute distress.    Neck:  Supple, No carotid bruit, No jugular venous distention, No lymphadenopathy, No thyromegaly.    Respiratory:  Lungs are clear to auscultation, Respirations are non-labored, Breath sounds are equal, Symmetrical chest wall expansion.    Cardiovascular:  Normal rate, Regular rhythm, No murmur, No gallop, Good pulses equal in all extremities, Normal peripheral perfusion, No edema.    Gastrointestinal:  Soft, Non-tender, Non-distended, Normal bowel sounds, No organomegaly.    Integumentary:  Warm, Dry, Pink.    Neurologic:  Alert, Oriented, Normal sensory, Normal motor function, No focal deficits, Cranial Nerves II-XII are grossly intact, Normal deep tendon reflexes.    Psychiatric:  Cooperative, Appropriate mood & affect, Normal judgment.

## 2022-02-16 NOTE — NURSING NOTE
CAGE Assessment Entered On:  5/29/2019 10:09 AM CDT    Performed On:  5/29/2019 10:09 AM CDT by Ting Lopez CMA               Assessment   Have you ever felt you should cut down on your drinking :   No   Have people annoyed you by criticizing your drinking :   No   Have you ever felt bad or guilty about your drinking :   No   Have you ever taken a drink first thing in the morning to steady your nerves or get rid of a hangover (Eye-opener) :   No   CAGE Score :   0    Ting Lopez CMA - 5/29/2019 10:09 AM CDT

## 2022-02-16 NOTE — TELEPHONE ENCOUNTER
Entered by Harshil Mejía CMA on May 06, 2021 12:07:03 PM CDT  ---------------------  From: Harshil Mejía CMA   To: [facility]    Sent: 5/6/2021 12:06:59 PM CDT  Subject: Medication Management     ** Not Approved: Patient has requested refill too soon, Pt given #90 and 1 refill 1/19/21 **  losartan (LOSARTAN POT 100MG)  ONE (1) TAB(S) ORAL DAILY  Qty:  30 tab(s)        Days Supply:  90        Refills:  0          Substitutions Allowed     Route To Pharmacy - [facility]   Note from Pharmacy:  * * N O T I C E * * Last quantity doesn't match original quantity  Signed by Harshil Mejía CMA            ** Patient matched by Harshil Mejía CMA on 5/6/2021 12:05:48 PM CDT **      ------------------------------------------  From: [facility]  To: Raphael Hsu PA-C  Sent: May 6, 2021 9:22:21 AM CDT  Subject: Medication Management  Due: April 27, 2021 12:41:43 AM CDT     ** On Hold Pending Signature **     Drug: losartan (losartan 100 mg oral tablet), ONE (1) TAB(S) ORAL DAILY  Quantity: 30 tab(s)  Days Supply: 90  Refills: 0  Substitutions Allowed  Notes from Pharmacy:     Dispensed Drug: losartan (losartan 100 mg oral tablet), ONE (1) TAB(S) ORAL DAILY  Quantity: 30 tab(s)  Days Supply: 90  Refills: 0  Substitutions Allowed  Notes from Pharmacy: * * N O T I C E * * Last quantity doesn t match original quantity  ------------------------------------------   operating room

## 2022-02-16 NOTE — PROGRESS NOTES
Patient:   JAN SHIPLEY            MRN: [id number]            FIN: [id number]               Age:   68 years     Sex:  Female     :  1950   Associated Diagnoses:   Hypertension; Hyperlipidemia NOS; Diabetes Mellitus; Depression, major, recurrent, in complete remission; Diabetic Neuropathy   Author:   Raphael Molina MD      Impression and Plan   Diagnosis     Hypertension (MKJ50-AF I10).     Course:  Well controlled.    Orders     Orders   Charges (Evaluation and Management):  61847 office outpatient visit 25 minutes (Charge) (Order): Quantity: 1, Diabetes Mellitus  Hyperlipidemia NOS  Hypertension.     Orders (Selected)   Prescriptions  Prescribed  hydrochlorothiazide-losartan 12.5 mg-100 mg oral tablet: 1 tab(s), po, daily, # 90 tab(s), 1 Refill(s), Type: Maintenance, Pharmacy: [facility], Needs to be seen for more refills., 1 tab(s) Oral daily.     Diagnosis     Hyperlipidemia NOS (KPI42-LX E78.00).     Course:  Progressing as expected.    Orders     Orders (Selected)   Prescriptions  Prescribed  atorvastatin 20 mg oral tablet: = 1 tab(s) ( 20 mg ), PO, Daily, # 90 tab(s), 1 Refill(s), Type: Maintenance, Pharmacy: [facility], Needs to be seen for more refills., 1 tab(s) Oral daily.     Diagnosis     Diabetes Mellitus (UFJ68-FX E11.9).     Course:  Well controlled.    Orders     Orders (Selected)   Prescriptions  Prescribed  metFORMIN 500 mg oral tablet: = 1 tab(s) ( 500 mg ), PO, BID, # 180 tab(s), 1 Refill(s), Type: Maintenance, Pharmacy: [facility], Needs to be seen for more refills., 1 tab(s) Oral bid.     Diagnosis     Depression, major, recurrent, in complete remission (YUB11-PB F33.42).     Course:  Progressing as expected.    Orders     Orders (Selected)   Prescriptions  Prescribed  citalopram 40 mg oral tablet: = 1 tab(s) ( 40 mg ), PO, Daily, # 90 tab(s), 1 Refill(s), Type: Maintenance, Pharmacy: [facility], Needs to be seen for more refills., 1 tab(s) Oral daily.      Diagnosis     Diabetic Neuropathy (ZBV66-KQ E11.40).     Course:  Progressing as expected.    Orders     Orders (Selected)   Prescriptions  Prescribed  gabapentin 100 mg oral capsule: = 1 cap(s) ( 100 mg ), PO, bid, # 180 cap(s), 1 Refill(s), Type: Maintenance, Pharmacy: [facility], Needs to be seen for more refills., 1 cap(s) Oral bid.        Visit Information      Date of Service: 10/29/2018 10:20 am  Performing Location: [facility]  Encounter#: [id number]      Primary Care Provider (PCP):  Raphael Molina MD# [id number]      Referring Provider:  Raphael Molina MD# [id number]   Visit type:  New symptom.    Accompanied by:  No one.    Source of history:  Self.    History limitation:  None.       Chief Complaint   Chief complaint discussed and confirmed correct.     10/29/2018 10:27 AM CDT  Pt here for med ck        History of Present Illness             The patient presents for follow-up evaluation of diabetes.  The quality of the patient's diabetes is described as being unchanged from previous visit.  Exacerbating factors consist of noncompliance with diet and sedentary lifestyle.  Relieving factors consist of medication.  Associated symptoms consist of none.  Medical encounters: none.  Compliance problems: with diet and with weight management.  Glucose results: within target range.  Hemoglobin A1c results: < 6%.  Lifestyle modification: weight reduction, diet, increased physical activity.  Medications: See medication list.  Additional pertinent history: last podiatric foot exam: 10/29/2018 and eye exam recommended.  Target A1c: 7.0    Target BS: 125      Fastin      Post prandial:150    Meter BS averages: NA    Hypoglycemia      Frequency:never      Severity:      Awareness:      Treatment:    Microvascular      Eye:neg      Kidney:neg      Neuro:peripheral neuropathy      Autonomic:neg       Macrovascular      CAD:neg      PVD:neg      HLD:pos      HTN:pos         Health Care Maintenance      Aspirin:yes      Pneumovax:yes 2010      Flu vaccine:refused      Foot exam:yes      Tobacco:no    Diet History:SAD    Laboratory      Last A1c:6.5      Last LDL:89      Last creatinine0.73      Last MARIS: 6.               The patient presents for follow-up evaluation of hypertension.  The quality of hypertension symptom(s) since the patient's last visit is described as being unchanged.  The severity of the hypertension symptom(s) since the last visit is moderate.  Since the patient's last visit, the timing/course of hypertension symptom(s) is constant.  Exacerbating factors consist of none.  Relieving factors consist of medication.  Associated symptoms consist of none.  Prior treatment consists of lifestyle modification (weight reduction, dietary sodium restriction, increased physical activity).  Medical encounters: none.  Compliance problems: none.        Interval History   Cholesterol Management   Total cholesterol results < 200 mg/dL (optimal).  LDL cholesterol results < 100 mg/dL (optimal).  HDL cholesterol results 40-60 mg/dl.  Triglyceride results < 150 mg/dL (normal).  The course is progressing as expected.  Compliance problems: none.  The effect on daily activities is no change in activity level and no change in eating habits.  Associated symptoms characterized by no fatigue, chest pain, joint pain, muscle weakness or myalgias.        Review of Systems   Constitutional:  Negative.    Eye:  Negative.    Ear/Nose/Mouth/Throat:  Negative.    Respiratory:  Negative.    Cardiovascular:  Negative.    Gastrointestinal:  Negative.    Genitourinary:  Negative.    Hematology/Lymphatics:  Negative.    Endocrine:  Negative.    Immunologic:  Negative.    Musculoskeletal:  Negative.    Integumentary:  Negative.    Neurologic:  Headache.    Psychiatric:  Negative.    All other systems reviewed and negative      Health Status   Allergies:    Allergic Reactions (Selected)  No Known Medication  Allergies   Medications:  (Selected)   Prescriptions  Prescribed  CeleBREX 200 mg oral capsule: = 1 cap(s) ( 200 mg ), PO, Daily, # 90 cap(s), 1 Refill(s), Type: Maintenance, Pharmacy: [facility], 1 cap(s) Oral daily  aspirin 81 mg oral delayed release tablet: = 1 tab(s) ( 81 mg ), PO, Daily, # 90 tab(s), 1 Refill(s), Type: Maintenance, Pharmacy: [facility], 1 tab(s) Oral daily  atorvastatin 20 mg oral tablet: = 1 tab(s) ( 20 mg ), PO, Daily, # 90 tab(s), 1 Refill(s), Type: Maintenance, Pharmacy: [facility], Needs to be seen for more refills., 1 tab(s) Oral daily  citalopram 40 mg oral tablet: = 1 tab(s) ( 40 mg ), PO, Daily, # 90 tab(s), 1 Refill(s), Type: Maintenance, Pharmacy: [facility], Needs to be seen for more refills., 1 tab(s) Oral daily  gabapentin 100 mg oral capsule: = 1 cap(s) ( 100 mg ), PO, bid, # 180 cap(s), 1 Refill(s), Type: Maintenance, Pharmacy: [facility], Needs to be seen for more refills., 1 cap(s) Oral bid  hydrochlorothiazide-losartan 12.5 mg-100 mg oral tablet: 1 tab(s), po, daily, # 90 tab(s), 1 Refill(s), Type: Maintenance, Pharmacy: [facility], Needs to be seen for more refills., 1 tab(s) Oral daily  metFORMIN 500 mg oral tablet: = 1 tab(s) ( 500 mg ), PO, BID, # 180 tab(s), 1 Refill(s), Type: Maintenance, Pharmacy: [facility], Needs to be seen for more refills., 1 tab(s) Oral bid   Problem list:    All Problems  Colon cancer / SNOMED CT 294924356 / Confirmed  Depression, major, recurrent, in complete remission / ICD-9-.36 / Confirmed  Diabetes Mellitus / ICD-9- / Confirmed  Diabetic Neuropathy / ICD-9-.60 / Confirmed  Hyperlipidemia NOS / SNOMED CT 034050570 / Confirmed  Hypertension / ICD-9-.91 / Confirmed  Long term current use of oral hypoglycemic drug / SNOMED CT 735381824 / Confirmed  Morbid Obesity / ICD-9-.01 / Probable      Histories   Past Medical History:    Active  Morbid Obesity  (278.01)  Hyperlipidemia NOS (728250144)  Hypertension (997.91)  Diabetes Mellitus (250)  Depression, major, recurrent, in complete remission (296.36)  Diabetic Neuropathy (250.60)  Colon cancer (111293012)   Family History:    Diabetes mellitus  Father  Brother  Grandmother (P)  Hypertension  Grandmother (M)  Heart disease  Grandfather (P)  Father  Grandmother (M)  Substance abuse  Father     Procedure history:    Colonoscopy (SNOMED CT 428636808) performed by Leela Crooks MD on 2016 at 65 Years.  Comments:  2016 8:02 AM - Moon Alicia  Indication:  Follow up previous lesion.  Sedation:  MAC  Impression:  Diverticulosis of lg int w/o perforation or abscess with bl.  Personal history of malignant neoplasm of large in.  Benign neoplasm of ascending colon.  Colonoscopy (SNOMED CT 853453839) performed by Leela Crooks MD on 10/23/2015 at 65 Years.  Comments:  2015 8:43 AM - Moon Alicia  Indication:  History of colon cancer.  Sedation: MAC  sigmoid colon resection in  at 64 Years.  gall bladder removed.  tubes tied.  appendectomy.  benign lump from back of neck removed.   x 3.   Social History:        Alcohol Assessment: Denies Alcohol Use            Never      Tobacco Assessment: Past            Past      Substance Abuse Assessment            Never      Employment and Education Assessment            Retired      Home and Environment Assessment            Marital status: .  Lives with Self.  3 children.  Living situation: Home/Independent.        Physical Examination   Vital signs reviewed  and within acceptable limits    Vital Signs   10/29/2018 10:27 AM CDT Peripheral Pulse Rate 81 bpm    Systolic Blood Pressure 120 mmHg    Diastolic Blood Pressure 78 mmHg    Mean Arterial Pressure 92 mmHg    BP Site Right arm    Oxygen Saturation 96 %      Measurements from flowsheet : Measurements   10/29/2018 10:27 AM CDT Height Measured - Standard 71.75 in    Weight Measured - Standard 321 lb     BSA 2.71 m2    Body Mass Index 43.83 kg/m2  HI      PHQ 9 score: 3   General:  Alert and oriented, No acute distress.    HENT:  Normocephalic.    Neck:  Supple, No lymphadenopathy, No thyromegaly.    Respiratory:  Lungs are clear to auscultation, Respirations are non-labored, Breath sounds are equal, Symmetrical chest wall expansion.    Cardiovascular:  Normal rate, Regular rhythm, No murmur, No gallop, Good pulses equal in all extremities, Normal peripheral perfusion, No edema.    Gastrointestinal:  Soft, Non-tender, Non-distended, Normal bowel sounds, No organomegaly.    Musculoskeletal:  Normal range of motion, No swelling, No deformity, Normal gait.    Integumentary:  Warm, Dry, Pink, No foot ulcers or skin lesions..    Feet:  Normal by visual exam, Sensation intact, By monofilament exam.    Neurologic:  Alert, Oriented, Normal sensory, Normal motor function, No focal deficits.    Psychiatric:  Cooperative, Appropriate mood & affect.

## 2022-02-16 NOTE — NURSING NOTE
Depression Screening Entered On:  5/29/2019 10:10 AM CDT    Performed On:  5/29/2019 10:09 AM CDT by Ting Lopez CMA               Depression Screening   Little Interest - Pleasure in Activities :   Nearly every day   Feeling Down, Depressed, Hopeless :   Several days   Initial Depression Screen Score :   4    Trouble Falling or Staying Asleep :   Not at all   Feeling Tired or Little Energy :   Nearly every day   Poor Appetite or Overeating :   More than half the days   Feeling Bad About Yourself :   Several days   Trouble Concentrating :   Several days   Moving or Speaking Slowly :   Several days   Thoughts Better Off Dead or Hurting Self :   Not at all   Detailed Depression Screen Score :   8    Total Depression Screen Score :   12    Ting Lopez CMA - 5/29/2019 10:09 AM CDT

## 2022-02-16 NOTE — NURSING NOTE
Depression Screening Entered On:  1/19/2021 10:44 AM CST    Performed On:  1/19/2021 10:43 AM CST by Harshil Mejía CMA               Depression Screening   Little Interest - Pleasure in Activities :   Several days   Feeling Down, Depressed, Hopeless :   Not at all   Initial Depression Screen Score :   1 Score   Poor Appetite or Overeating :   Several days   Trouble Falling or Staying Asleep :   Not at all   Feeling Tired or Little Energy :   Several days   Feeling Bad About Yourself :   Not at all   Trouble Concentrating :   Not at all   Moving or Speaking Slowly :   Not at all   Thoughts Better Off Dead or Hurting Self :   Not at all   LAURA Difficulty with Work, Home, Others :   Somewhat difficult   Detailed Depression Screen Score :   2    Total Depression Screen Score :   3    Harshil Mejía CMA - 1/19/2021 10:43 AM CST

## 2022-02-16 NOTE — LETTER
(Inserted Image. Unable to display)   November 23, 2021  JAN SHIPLEY  [address] [place] [address]        Dear JAN,    Thank you for selecting [facility] for your healthcare needs.    Our records indicate you are due for the following services:     Follow-up office visit      (FYI   Regarding office visits: In some instances, a video visit or telephone visit may be offered as an option.)    To schedule an appointment or if you have further questions, please contact your clinic at [phone].    Powered by [employer]    Sincerely,    CATALINO Dunne

## 2022-02-16 NOTE — NURSING NOTE
Comprehensive Intake Entered On:  7/9/2020 10:24 AM CDT    Performed On:  7/9/2020 10:13 AM CDT by Brissa Shelley               Summary   Chief Complaint :   c/o chronic Lt shoulder/arm pain and back pain. Verbal consent given for VIDEO visit- daughter Dominique's phone [phone]   Nasreen DORISBrissa BELTRAN - 7/9/2020 10:13 AM CDT   Health Status   Allergies Verified? :   Yes   Medication History Verified? :   Yes   Medical History Verified? :   Yes   Pre-Visit Planning Status :   Completed   Tobacco Use? :   Former smoker   Brissa Shelley - 7/9/2020 10:13 AM CDT   Consents   Consent for Immunization Exchange :   Consent Granted   Consent for Immunizations to Providers :   Consent Granted   Brissa Shelley - 7/9/2020 10:13 AM CDT   Meds / Allergies   (As Of: 7/9/2020 10:24:05 AM CDT)   Allergies (Active)   No Known Medication Allergies  Estimated Onset Date:   Unspecified ; Created By:   Charlotte Loza CMA; Reaction Status:   Active ; Category:   Drug ; Substance:   No Known Medication Allergies ; Type:   Allergy ; Updated By:   Charlotte Loza CMA; Reviewed Date:   12/11/2019 9:24 AM CST        Medication List   (As Of: 7/9/2020 10:24:05 AM CDT)   Prescription/Discharge Order    clobetasol topical  :   clobetasol topical ; Status:   Prescribed ; Ordered As Mnemonic:   clobetasol 0.05% topical cream ; Simple Display Line:   1 chichi, Topical, bid, 30 gm, 1 Refill(s) ; Ordering Provider:   Raphael Molina MD; Catalog Code:   clobetasol topical ; Order Dt/Tm:   12/11/2019 3:12:04 PM CST          atorvastatin  :   atorvastatin ; Status:   Prescribed ; Ordered As Mnemonic:   atorvastatin 20 mg oral tablet ; Simple Display Line:   20 mg, 1 tab(s), PO, Daily, 90 tab(s), 1 Refill(s) ; Ordering Provider:   Raphael Molina MD; Catalog Code:   atorvastatin ; Order Dt/Tm:   12/11/2019 9:43:35 AM CST          celecoxib  :   celecoxib ; Status:   Prescribed ; Ordered As Mnemonic:   CeleBREX 200 mg oral capsule ; Simple Display Line:    200 mg, 1 cap(s), PO, Daily, 90 cap(s), 1 Refill(s) ; Ordering Provider:   Raphael Molina MD; Catalog Code:   celecoxib ; Order Dt/Tm:   12/11/2019 9:43:45 AM CST          citalopram  :   citalopram ; Status:   Prescribed ; Ordered As Mnemonic:   citalopram 40 mg oral tablet ; Simple Display Line:   40 mg, 1 tab(s), PO, Daily, 90 tab(s), 1 Refill(s) ; Ordering Provider:   Raphael Molina MD; Catalog Code:   citalopram ; Order Dt/Tm:   12/11/2019 9:43:30 AM CST          gabapentin  :   gabapentin ; Status:   Prescribed ; Ordered As Mnemonic:   gabapentin 100 mg oral capsule ; Simple Display Line:   200 mg, 2 cap(s), PO, bid, 360 cap(s), 1 Refill(s) ; Ordering Provider:   Raphael Molina MD; Catalog Code:   gabapentin ; Order Dt/Tm:   12/11/2019 9:43:05 AM CST          losartan  :   losartan ; Status:   Prescribed ; Ordered As Mnemonic:   losartan 100 mg oral tablet ; Simple Display Line:   100 mg, 1 tab(s), PO, Daily, 90 tab(s), 1 Refill(s) ; Ordering Provider:   Raphael Molina MD; Catalog Code:   losartan ; Order Dt/Tm:   12/11/2019 9:43:40 AM CST          metFORMIN  :   metFORMIN ; Status:   Prescribed ; Ordered As Mnemonic:   metFORMIN 500 mg oral tablet ; Simple Display Line:   500 mg, 1 tab(s), PO, BID, 180 tab(s), 1 Refill(s) ; Ordering Provider:   Raphael Molina MD; Catalog Code:   metFORMIN ; Order Dt/Tm:   12/11/2019 9:43:25 AM CST          risperiDONE  :   risperiDONE ; Status:   Prescribed ; Ordered As Mnemonic:   risperiDONE 1 mg oral tablet ; Simple Display Line:   1.5 tab(s), Oral, daily, 135 EA, 1 Refill(s) ; Ordering Provider:   Raphael Molina MD; Catalog Code:   risperiDONE ; Order Dt/Tm:   12/11/2019 9:43:19 AM CST            ID Risk Screen   Recent Travel History :   No recent travel   Family Member Travel History :   No recent travel   Other Exposure to Infectious Disease :   Unknown   Escoe RMA, Brissa - 7/9/2020 10:13 AM CDT

## 2022-02-16 NOTE — PROGRESS NOTES
Patient:   JAN SHIPLEY            MRN: [id number]            FIN: [id number]               Age:   69 years     Sex:  Female     :  1950   Associated Diagnoses:   Skin lesion of right leg   Author:   Raphael Molina MD      Impression and Plan   Diagnosis     Skin lesion of right leg (IDD65-KC L98.9).     Orders      (Selected)   Outpatient Orders  Ordered (Dispatched)  Tissue pathology* ([facility]): Specimen Type: Miscellaneous Specimen, Collection Date: 19 11:17:00 CDT  Completed  [id number] punch bx skin single lesion (Charge): Quantity: 1, Skin lesion of right leg.        Visit Information      Date of Service: 2019 10:34 am  Performing Location: [facility]  Encounter#: [id number]      Primary Care Provider (PCP):  Raphael Molina MD    NPI# [id number]   Visit type:  New symptom.    Accompanied by:  No one.    Source of history:  Self.    History limitation:  None.       Procedure   Biopsy procedure   Date/ Time:  2019 11:19:00 AM.     Confirmed: patient, procedure, side, site, safety procedures followed.     Performed by: Raphael Molina MD.     Informed consent: signed by patient.     Indication: changing skin lesion, one lesion on right lateral lower leg and two lesions on the left leg.  No improvement with two weeks of Antifungal cream or two weeks of Steroid cream..     Preparation and technique: skin prepped (in usual sterile fashion, with alcohol), local anesthesia 1% lidocaine without epinephrine, technique (punch biopsy, 5 mm), hemostasis achieved (using sutures, one 3-0 Ethilon suture).     Site #  1: right, lateral, leg, size and depth 5 mm punch, specimen sent to pathology in preservative.     Completion: 1  lesions biopsied, estimated blood loss minimal, skin reapproximated sutured (using  3 -0, nylon, interrupted fashion).     Procedure tolerated: well.     suture removal 10 days.  wound care instructions given..

## 2022-02-16 NOTE — LETTER
(Inserted Image. Unable to display)   January 20, 2021      JAN SHIPLEY      [address] [place], [place] [id number]        Dear JAN,    Thank you for selecting [facility] for your healthcare needs.  Below you will find the results of you recent tests done at our clinic.     All of these results are acceptable.  Your diabetes is under good control.  Continue on current medications and recheck these labs in a year.      Result Name Current Result Previous Result Reference Range   Sodium Level (mmol/L)  139 1/19/2021  138 12/11/2019 135 - 146   Potassium Level (mmol/L)  4.3 1/19/2021  4.7 12/11/2019 3.5 - 5.3   Chloride Level (mmol/L)  103 1/19/2021  102 12/11/2019 98 - 110   CO2 Level (mmol/L)  27 1/19/2021  30 12/11/2019 20 - 32   Glucose Level (mg/dL) ((H)) 136 1/19/2021 ((H)) 153 12/11/2019 65 - 99   BUN (mg/dL)  15 1/19/2021  13 12/11/2019 7 - 25   Creatinine Level (mg/dL)  0.68 1/19/2021  0.69 12/11/2019 0.60 - 0.93   eGFR (mL/min/1.73m2)  87 1/19/2021  88 12/11/2019 > OR = 60 -    Calcium Level (mg/dL)  9.3 1/19/2021  9.6 12/11/2019 8.6 - 10.4   Hgb A1c ((H)) 6.2 1/19/2021 ((H)) 6.7 12/11/2019  - <5.7   Cholesterol (mg/dL)  146 1/19/2021  165 12/11/2019  - <200   Non-HDL Cholesterol  102 1/19/2021  123 12/11/2019  - <130   HDL (mg/dL) ((L)) 44 1/19/2021 ((L)) 42 12/11/2019 > OR = 50 -    Cholesterol/HDL Ratio  3.3 1/19/2021  3.9 12/11/2019  - <5.0   LDL  74 1/19/2021  95 12/11/2019    Triglyceride (mg/dL) ((H)) 179 1/19/2021 ((H)) 189 12/11/2019  - <150   U Creatinine (mg/dL)  131 1/19/2021  20 - 275   U Microalbumin (mg/dL)  5.5 1/19/2021  See Note: -    Ur Microalbumin/Creatinine Ratio ((H)) 42 1/19/2021   - <30   WBC  8.8 1/19/2021  6.4 12/11/2019 3.8 - 10.8   RBC  4.68 1/19/2021  4.78 12/11/2019 3.80 - 5.10   Hgb (gm/dL)  14.5 1/19/2021  14.7 12/11/2019 11.7 - 15.5   Hct (%)  42.4 1/19/2021  42.6 12/11/2019 35.0 - 45.0   MCV (fL)  90.6 1/19/2021  89.1 12/11/2019 80.0 - 100.0    MCH (pg)  31.0 1/19/2021  30.8 12/11/2019 27.0 - 33.0   MCHC (gm/dL)  34.2 1/19/2021  34.5 12/11/2019 32.0 - 36.0   RDW (%)  12.0 1/19/2021  11.9 12/11/2019 11.0 - 15.0   Platelet  319 1/19/2021  286 12/11/2019 140 - 400   MPV (fL)  10.2 1/19/2021  10.6 12/11/2019 7.5 - 12.5       Please contact me or my assistant at [phone] if you have any questions.     Sincerely,        Raphael Hsu PA-C    What do your labs mean?  Below is a glossary of commonly ordered labs:  LDL   Bad Cholesterol   HDL   Good Cholesterol  AST/ALT   Liver Function   Cr/Creatinine   Kidney Function  Microalbumin   Kidney Function  BUN   Kidney Function  PSA   Prostate    TSH   Thyroid Hormone  HgbA1c   Diabetes Test   Hgb (Hemoglobin)   Red Blood Cells

## 2022-02-16 NOTE — NURSING NOTE
CAGE Assessment Entered On:  1/19/2021 10:43 AM CST    Performed On:  1/19/2021 10:43 AM CST by Harshil Mejía CMA               Assessment   Have you ever felt you should cut down on your drinking :   No   Have people annoyed you by criticizing your drinking :   No   Have you ever felt bad or guilty about your drinking :   No   Have you ever taken a drink first thing in the morning to steady your nerves or get rid of a hangover (Eye-opener) :   No   CAGE Score :   0    Harshil Mejía CMA - 1/19/2021 10:43 AM CST

## 2022-02-16 NOTE — PROGRESS NOTES
Patient:   JAN SHIPLEY            MRN: [id number]            FIN: [id number]               Age:   70 years     Sex:  Female     :  1950   Associated Diagnoses:   Colon cancer; Depression, major, recurrent, in complete remission; Diabetes Mellitus; Diabetic Neuropathy; Hyperlipidemia NOS; Hypertension; Morbid Obesity; Primary osteoarthritis   Author:   Raphael Hsu PA-C      Chief Complaint   2021 10:44 AM CST   Pt here for a chronic disease visit and fasting labwork.  Pt also c/o diarrhea on and off x 1-2 years.      History of Present Illness   Chief complaint and symptoms noted above and confirmed with patient   she has a hx of DM, HTN, hyperlipidemia, diabetic neuropathy, OA, depression, obesity  here today for med refills and labs (last labs were in 2019)  she also has a hx of colon adenocarcinoma with a splenic flexure resection, last colonoscopy was in , due to repeat this year      Review of Systems   Constitutional:  Negative.    Ear/Nose/Mouth/Throat:  Negative.    Respiratory:  Negative.    Cardiovascular:  Negative.    Gastrointestinal:  Diarrhea.    Genitourinary:  Negative.    Neurologic   Psychiatric:  PHQ-9=  3.       Health Status   Allergies:    Allergic Reactions (Selected)  No Known Medication Allergies   Medications:  (Selected)   Prescriptions  Prescribed  CeleBREX 200 mg oral capsule: = 1 cap(s) ( 200 mg ), PO, Daily, # 30 cap(s), 0 Refill(s), Type: Maintenance, Pharmacy: [email or web address], 1 cap(s) Oral daily, 71.75, in, 19 9:20:00 CST, Height Measured, 306, lb, 20 10:37:00 CDT, Weight Measured  atorvastatin 20 mg oral tablet: = 1 tab(s) ( 20 mg ), Oral, daily, Instructions: *NEEDS LABS AND ANNUAL VISIT WITH DR. MOHAN PRIOR TO ADDITIONAL REFILLS*, # 30 tab(s), 0 Refill(s), Type: Maintenance, Pharmacy: [facility], 1 tab(s) Oral daily,Instr:*NEEDS LABS AND ANNUAL VI...  citalopram 40 mg oral tablet: = 1 tab(s) ( 40 mg ), PO, Daily, # 30 tab(s), 0  Refill(s), Type: Maintenance, Pharmacy: [facility], 1 tab(s) Oral daily, 71.75, in, 12/11/19 9:20:00 CST, Height Measured, 306, lb, 08/12/20 10:37:00 CDT, Weight Measured  clobetasol 0.05% topical cream: 1 chichi, Topical, bid, # 30 gm, 1 Refill(s), Type: Maintenance, Pharmacy: [facility], 1 chichi Topical bid  gabapentin 100 mg oral capsule: = 2 cap(s) ( 200 mg ), PO, bid, # 360 cap(s), 0 Refill(s), Type: Maintenance, Pharmacy: [facility], 2 cap(s) Oral bid, 71.75, in, 12/11/19 9:20:00 CST, Height Measured, 306, lb, 08/12/20 10:37:00 CDT, Weight Measured  losartan 100 mg oral tablet: = 1 tab(s) ( 100 mg ), PO, Daily, # 30 tab(s), 0 Refill(s), Type: Maintenance, Pharmacy: [facility], 1 tab(s) Oral daily, 71.75, in, 12/11/19 9:20:00 CST, Height Measured, 306, lb, 08/12/20 10:37:00 CDT, Weight Measured  metFORMIN 500 mg oral tablet: = 1 tab(s) ( 500 mg ), PO, BID, # 60 tab(s), 0 Refill(s), Type: Maintenance, Pharmacy: [facility], ., 1 tab(s) Oral bid, 71.75, in, 12/11/19 9:20:00 CST, Height Measured, 306, lb, 08/12/20 10:37:00 CDT, Weight Measured  risperiDONE 1 mg oral tablet: = 1.5 tab(s), Oral, daily, # 45 tab(s), 0 Refill(s), Type: Maintenance, Pharmacy: [facility], 1.5 tab(s) Oral daily, 71.75, in, 12/11/19 9:20:00 CST, Height Measured, 306, lb, 08/12/20 10:37:00 CDT, Weight Measured   Problem list:    All Problems  Diabetes Mellitus / ICD-9- / Confirmed  Diabetic Neuropathy / ICD-9-.60 / Confirmed  Long term current use of oral hypoglycemic drug / SNOMED CT 634705969 / Confirmed  Morbid Obesity / ICD-9-.01 / Probable  Depression, major, recurrent, in complete remission / ICD-9-.36 / Confirmed  Primary osteoarthritis / SNOMED CT 297347800 / Confirmed  Hyperlipidemia NOS / SNOMED CT 254603289 / Confirmed  Colon cancer / SNOMED CT 505102376 / Confirmed  Hypertension / ICD-9-.91 / Confirmed      Histories   Past Medical History:     Active  Morbid Obesity (278.01)  Hyperlipidemia NOS (469477713)  Hypertension (997.91)  Diabetes Mellitus (250)  Depression, major, recurrent, in complete remission (296.36)  Diabetic Neuropathy (250.60)  Colon cancer (219218400)   Family History:    Diabetes mellitus  Father  Brother  Grandmother (P)  Hypertension  Grandmother (M)  Heart disease  Grandfather (P)  Father  Grandmother (M)  Substance abuse  Father     Procedure history:    Colonoscopy ([id number]) on 2016 at 65 Years.  Comments:  2016 8:02 AM CDT - Moon Alicia  Indication:  Follow up previous lesion.  Sedation:  MAC  Impression:  Diverticulosis of lg int w/o perforation or abscess with bl.  Personal history of malignant neoplasm of large in.  Benign neoplasm of ascending colon.  Colonoscopy ([id number]) on 10/23/2015 at 65 Years.  Comments:  2015 8:43 AM CST - Moon Alicia  Indication:  History of colon cancer.  Sedation: MAC  sigmoid colon resection in  at 64 Years.  gall bladder removed.  tubes tied.  appendectomy.  benign lump from back of neck removed.   x 3.   Social History:        Electronic Cigarette/Vaping Assessment            Electronic Cigarette Use: Never.      Alcohol Assessment: Denies Alcohol Use            Never      Tobacco Assessment: Past            Former smoker, quit more than 30 days ago            Past      Substance Abuse Assessment            Never      Employment and Education Assessment            Retired      Home and Environment Assessment            Marital status: .  Lives with Self.  3 children.  Living situation: Home/Independent.        Physical Examination   Vital Signs   2021 10:44 AM CST Temperature Tympanic 97.9 DegF    Peripheral Pulse Rate 64 bpm    Pulse Site Radial artery    Respiratory Rate 16 br/min    Systolic Blood Pressure 130 mmHg    Diastolic Blood Pressure 64 mmHg    Mean Arterial Pressure 86 mmHg    BP Site Left upper leg    Oxygen Saturation 95 %       Measurements from flowsheet : Measurements   1/19/2021 10:44 AM CST Height Measured - Standard 71.75 in    Height/Length Estimated 71.75 in    Weight Measured - Standard 308 lb    BSA 2.66 m2    Body Mass Index 42.06 kg/m2  HI      General:  No acute distress.    Eye:  Pupils are equal, round and reactive to light, Extraocular movements are intact.    HENT:  Tympanic membranes are clear, No sinus tenderness.    Neck:  Supple, Non-tender, No lymphadenopathy.    Respiratory:  Lungs are clear to auscultation.    Cardiovascular:  Normal rate, Regular rhythm, No murmur.    Gastrointestinal:  Soft, Non-distended, Normal bowel sounds, No organomegaly, mild tenderness in right suprapubic region.    Musculoskeletal:  Normal range of motion.    Neurologic:  Cranial Nerves II-XII are grossly intact, Normal deep tendon reflexes.    Cognition and Speech:  Speech clear and coherent.    Psychiatric:  Appropriate mood & affect.       Impression and Plan   Diagnosis     Colon cancer (FRQ92-BZ C18.9).     Depression, major, recurrent, in complete remission (TYC32-IU F33.42).     Diabetes Mellitus (GVR10-BW E11.9).     Diabetic Neuropathy (ZHB92-CX E11.40).     Hyperlipidemia NOS (NCJ83-MW E78.00).     Hypertension (CGG85-MV I10).     Morbid Obesity (QIG94-HP E66.01).     Primary osteoarthritis (NKQ62-JF M19.91).     Summary:  all conditions are stable, will check labs today  will renew her meds for 6 months  advised to schedule a mammogram  she is also due for a repeat colonoscopy this year, but declines to schedule that today  will give tetanus booster  also advised to get eye exam this year.    Orders     Orders   Lab (Gen Lab  Reference Lab):  Lipid panel with reflex to direct ldl* ([facility]) (Order): Specimen Type: Serum, Collection Date: 1/19/2021 11:16 AM CST  Microalbumin, random urine (w/creatinine)* ([facility]) (Order): Specimen Type: Urine, Collection Date: 1/19/2021 11:16 AM CST  Hemoglobin A1c* ([facility]) (Order): Specimen Type:  Blood, Collection Date: 1/19/2021 11:16 AM CST  CBC (h/h, RBC, indices, WBC, Plt)* ([facility]) (Order): Specimen Type: Blood, Collection Date: 1/19/2021 11:16 AM CST  Basic Metabolic Panel* ([facility]) (Order): Specimen Type: Serum, Collection Date: 1/19/2021 11:16 AM CST.     Orders   Pharmacy:  gabapentin 100 mg oral capsule (Prescribe): = 2 cap(s) ( 200 mg ), PO, bid, # 360 cap(s), 1 Refill(s), Type: Maintenance, Pharmacy: [facility], 2 cap(s) Oral bid, 71.75, in, 1/19/2021 10:44 AM CST, Height Measured, 308, lb, 1/19/2021 10:44 AM CST, Weight Measured  atorvastatin 20 mg oral tablet (Prescribe): = 1 tab(s) ( 20 mg ), Oral, daily, # 90 tab(s), 1 Refill(s), Type: Maintenance, Pharmacy: [facility], 1 tab(s) Oral daily, 71.75, in, 1/19/2021 10:44 AM CST, Height Measured, 308, lb, 1/19/2021 10:44 AM CST, Weight Measured  losartan 100 mg oral tablet (Prescribe): = 1 tab(s) ( 100 mg ), PO, Daily, # 90 tab(s), 1 Refill(s), Type: Maintenance, Pharmacy: [facility], 1 tab(s) Oral daily, 71.75, in, 1/19/2021 10:44 AM CST, Height Measured, 308, lb, 1/19/2021 10:44 AM CST, Weight Measured  risperiDONE 1 mg oral tablet (Prescribe): = 1.5 tab(s), Oral, daily, # 135 tab(s), 1 Refill(s), Type: Maintenance, Pharmacy: [facility], 1.5 tab(s) Oral daily, 71.75, in, 1/19/2021 10:44 AM CST, Height Measured, 308, lb, 1/19/2021 10:44 AM CST, Weight Measured  metFORMIN 500 mg oral tablet (Prescribe): = 1 tab(s) ( 500 mg ), PO, BID, # 180 tab(s), 1 Refill(s), Type: Maintenance, Pharmacy: [facility], ., 1 tab(s) Oral bid, 71.75, in, 1/19/2021 10:44 AM CST, Height Measured, 308, lb, 1/19/2021 10:44 AM CST, Weight Measured  citalopram 40 mg oral tablet (Prescribe): = 1 tab(s) ( 40 mg ), PO, Daily, # 90 tab(s), 1 Refill(s), Type: Maintenance, Pharmacy: [facility], 1 tab(s) Oral daily, 71.75, in, 1/19/2021 10:44 AM CST, Height Measured, 308, lb, 1/19/2021 10:44 AM CST, Weight Measured  CeleBREX 200 mg  oral capsule (Prescribe): = 1 cap(s) ( 200 mg ), PO, Daily, # 90 cap(s), 3 Refill(s), Type: Maintenance, Pharmacy: [facility], 1 cap(s) Oral daily, 71.75, in, 1/19/2021 10:44 AM CST, Height Measured, 308, lb, 1/19/2021 10:44 AM CST, Weight Measured  gabapentin 100 mg oral capsule (Modify): = 2 cap(s) ( 200 mg ), PO, bid, # 360 cap(s), 0 Refill(s), Type: Hard Stop, Pharmacy: [facility], 71.75, in, 12/11/19 9:20:00 CST, Height Measured, 306, lb, 08/12/20 10:37:00 CDT, Weight Measured  risperiDONE 1 mg oral tablet (Modify): = 1.5 tab(s), Oral, daily, # 45 tab(s), 0 Refill(s), Type: Hard Stop, Pharmacy: [facility], 71.75, in, 12/11/19 9:20:00 CST, Height Measured, 306, lb, 08/12/20 10:37:00 CDT, Weight Measured  metFORMIN 500 mg oral tablet (Modify): = 1 tab(s) ( 500 mg ), PO, BID, # 60 tab(s), 0 Refill(s), Type: Hard Stop, Pharmacy: [facility], ., 71.75, in, 12/11/19 9:20:00 CST, Height Measured, 306, lb, 08/12/20 10:37:00 CDT, Weight Measured  citalopram 40 mg oral tablet (Modify): = 1 tab(s) ( 40 mg ), PO, Daily, # 30 tab(s), 0 Refill(s), Type: Hard Stop, Pharmacy: [facility], 71.75, in, 12/11/19 9:20:00 CST, Height Measured, 306, lb, 08/12/20 10:37:00 CDT, Weight Measured  CeleBREX 200 mg oral capsule (Modify): = 1 cap(s) ( 200 mg ), PO, Daily, # 30 cap(s), 0 Refill(s), Type: Hard Stop, Pharmacy: [facility], 71.75, in, 12/11/19 9:20:00 CST, Height Measured, 306, lb, 08/12/20 10:37:00 CDT, Weight Measured  losartan 100 mg oral tablet (Modify): = 1 tab(s) ( 100 mg ), PO, Daily, # 30 tab(s), 0 Refill(s), Type: Hard Stop, Pharmacy: [facility], 71.75, in, 12/11/19 9:20:00 CST, Height Measured, 306, lb, 08/12/20 10:37:00 CDT, Weight Measured  atorvastatin 20 mg oral tablet (Modify): = 1 tab(s) ( 20 mg ), Oral, daily, Instructions: *NEEDS LABS AND ANNUAL VISIT WITH DR. MOHAN PRIOR TO ADDITIONAL REFILLS*, # 30 tab(s), 0 Refill(s), Type: Hard Stop, Pharmacy: [facility], 71.75, in, 12/11/19 9:20:00 CST, Height Measured, 306, lb, 08/12/20 10:37:00 CDT, Weight Measured.     Orders   Charges (Evaluation and Management):  63098 office o/p est mod 30-39 min (Charge) (Order): Quantity: 1, Diabetic Neuropathy  Morbid Obesity  Depression, major, recurrent, in complete remission  Colon cancer  Diabetes Mellitus  Primary osteoarthritis  Hyperlipidemia NOS  Hypertension.

## 2022-02-16 NOTE — NURSING NOTE
Comprehensive Intake Entered On:  8/30/2021 9:14 AM CDT    Performed On:  8/30/2021 9:09 AM CDT by Deepika Mendoza               Summary   Chief Complaint :   Patient is here today to talk about memory loss.    Weight Measured :   308.3 lb(Converted to: 308 lb 5 oz, 139.843 kg)    Height Measured :   71.75 in(Converted to: 6 ft 0 in, 182.24 cm)    Body Mass Index :   42.1 kg/m2 (HI)    Body Surface Area :   2.66 m2   Height/Length Estimated :   71.75 in(Converted to: 6 ft 0 in, 182.24 cm)    Systolic Blood Pressure :   124 mmHg   Diastolic Blood Pressure :   62 mmHg   Mean Arterial Pressure :   83 mmHg   Peripheral Pulse Rate :   59 bpm (LOW)    BP Site :   Right arm   BP Method :   Electronic   HR Method :   Electronic   Temperature Tympanic :   97.1 DegF(Converted to: 36.2 DegC)  (LOW)    Oxygen Saturation :   97 %   Deepika Mendoza - 8/30/2021 9:09 AM CDT   Health Status   Allergies Verified? :   Yes   Medication History Verified? :   Yes   Medical History Verified? :   Yes   Pre-Visit Planning Status :   Completed   Tobacco Use? :   Former smoker   Deepika Mendoza - 8/30/2021 9:09 AM CDT   Consents   Consent for Immunization Exchange :   Consent Granted   Consent for Immunizations to Providers :   Consent Granted   Deepika Mendoza - 8/30/2021 9:09 AM CDT   Meds / Allergies   (As Of: 8/30/2021 9:14:36 AM CDT)   Allergies (Active)   No Known Medication Allergies  Estimated Onset Date:   Unspecified ; Created By:   Charlotte Loza CMA; Reaction Status:   Active ; Category:   Drug ; Substance:   No Known Medication Allergies ; Type:   Allergy ; Updated By:   Charlotte Loza CMA; Reviewed Date:   8/30/2021 9:11 AM CDT        Medication List   (As Of: 8/30/2021 9:14:36 AM CDT)   Prescription/Discharge Order    risperiDONE  :   risperiDONE ; Status:   Prescribed ; Ordered As Mnemonic:   risperiDONE 1 mg oral tablet ; Simple Display Line:   1.5 tab(s), Oral, daily, 135 tab(s), 3 Refill(s) ; Ordering Provider:    Ligia Arias; Catalog Code:   risperiDONE ; Order Dt/Tm:   8/16/2021 6:17:37 PM CDT          metFORMIN  :   metFORMIN ; Status:   Prescribed ; Ordered As Mnemonic:   metFORMIN 500 mg oral tablet ; Simple Display Line:   See Instructions, ONE (1) TAB(S) ORAL TWICE DAILY, 180 tab(s), 1 Refill(s) ; Ordering Provider:   Ligia Arias; Catalog Code:   metFORMIN ; Order Dt/Tm:   8/16/2021 6:15:56 PM CDT          losartan  :   losartan ; Status:   Prescribed ; Ordered As Mnemonic:   losartan 100 mg oral tablet ; Simple Display Line:   See Instructions, ONE (1) TAB(S) ORAL DAILY, 90 EA, 1 Refill(s) ; Ordering Provider:   Ligia Arias; Catalog Code:   losartan ; Order Dt/Tm:   8/16/2021 6:16:37 PM CDT          gabapentin  :   gabapentin ; Status:   Prescribed ; Ordered As Mnemonic:   gabapentin 100 mg oral capsule ; Simple Display Line:   200 mg, 2 cap(s), PO, bid, 360 cap(s), 3 Refill(s) ; Ordering Provider:   Ligia Arias; Catalog Code:   gabapentin ; Order Dt/Tm:   8/16/2021 6:17:22 PM CDT          citalopram  :   citalopram ; Status:   Prescribed ; Ordered As Mnemonic:   citalopram 40 mg oral tablet ; Simple Display Line:   See Instructions, ONE (1) TAB(S) ORAL DAILY, 90 EA, 3 Refill(s) ; Ordering Provider:   Ligia Arias; Catalog Code:   citalopram ; Order Dt/Tm:   8/16/2021 6:16:23 PM CDT          celecoxib  :   celecoxib ; Status:   Prescribed ; Ordered As Mnemonic:   CeleBREX 200 mg oral capsule ; Simple Display Line:   200 mg, 1 cap(s), PO, Daily, 90 cap(s), 3 Refill(s) ; Ordering Provider:   Ligia Arias; Catalog Code:   celecoxib ; Order Dt/Tm:   8/16/2021 6:17:53 PM CDT          atorvastatin  :   atorvastatin ; Status:   Prescribed ; Ordered As Mnemonic:   atorvastatin 20 mg oral tablet ; Simple Display Line:   See Instructions, TAKE ONE (1) TABLET DAILY, 90 EA, 0 Refill(s) ; Ordering Provider:   Ligia Arias; Catalog Code:   atorvastatin ; Order Dt/Tm:   8/16/2021 6:16:55  PM CDT

## 2022-02-16 NOTE — NURSING NOTE
Comprehensive Intake Entered On:  12/11/2019 9:24 AM CST    Performed On:  12/11/2019 9:20 AM CST by Ting Lopez CMA               Summary   Chief Complaint :   Pt here for med ck   Weight Measured :   326 lb(Converted to: 326 lb 0 oz, 147.87 kg)    Height Measured :   71.75 in(Converted to: 6 ft 0 in, 182.24 cm)    Body Mass Index :   44.52 kg/m2 (HI)    Body Surface Area :   2.73 m2   Systolic Blood Pressure :   124 mmHg   Diastolic Blood Pressure :   80 mmHg   Mean Arterial Pressure :   95 mmHg   Peripheral Pulse Rate :   67 bpm   Oxygen Saturation :   98 %   Ting Lopez CMA - 12/11/2019 9:20 AM CST   Health Status   Allergies Verified? :   Yes   Medication History Verified? :   Yes   Medical History Verified? :   Yes   Pre-Visit Planning Status :   Completed   Tobacco Use? :   Former smoker   Ting Lopez CMA - 12/11/2019 9:20 AM CST   Consents   Consent for Immunization Exchange :   Consent Granted   Consent for Immunizations to Providers :   Consent Granted   Ting Lopez CMA - 12/11/2019 9:20 AM CST   Meds / Allergies   (As Of: 12/11/2019 9:24:14 AM CST)   Allergies (Active)   No Known Medication Allergies  Estimated Onset Date:   Unspecified ; Created By:   Charlotte Loza CMA; Reaction Status:   Active ; Category:   Drug ; Substance:   No Known Medication Allergies ; Type:   Allergy ; Updated By:   Charlotte Loza CMA; Reviewed Date:   12/11/2019 9:24 AM CST        Medication List   (As Of: 12/11/2019 9:24:14 AM CST)   Prescription/Discharge Order    aspirin  :   aspirin ; Status:   Processing ; Ordered As Mnemonic:   aspirin 81 mg oral delayed release tablet ; Ordering Provider:   Raphael Molina MD; Action Display:   Complete ; Catalog Code:   aspirin ; Order Dt/Tm:   12/11/2019 9:23:58 AM CST          atorvastatin  :   atorvastatin ; Status:   Prescribed ; Ordered As Mnemonic:   atorvastatin 20 mg oral tablet ; Simple Display Line:   20 mg, 1 tab(s), PO, Daily, 90 tab(s), 1 Refill(s) ;  Ordering Provider:   Raphael Molina MD; Catalog Code:   atorvastatin ; Order Dt/Tm:   5/29/2019 9:53:21 AM CDT          celecoxib  :   celecoxib ; Status:   Prescribed ; Ordered As Mnemonic:   CeleBREX 200 mg oral capsule ; Simple Display Line:   200 mg, 1 cap(s), PO, Daily, 90 cap(s), 1 Refill(s) ; Ordering Provider:   Raphael Molina MD; Catalog Code:   celecoxib ; Order Dt/Tm:   5/29/2019 9:53:09 AM CDT          citalopram  :   citalopram ; Status:   Prescribed ; Ordered As Mnemonic:   citalopram 40 mg oral tablet ; Simple Display Line:   40 mg, 1 tab(s), PO, Daily, 90 tab(s), 1 Refill(s) ; Ordering Provider:   Raphael Molina MD; Catalog Code:   citalopram ; Order Dt/Tm:   5/29/2019 9:53:27 AM CDT          clobetasol topical  :   clobetasol topical ; Status:   Prescribed ; Ordered As Mnemonic:   clobetasol 0.05% topical cream ; Simple Display Line:   1 chichi, Topical, bid, 30 gm, 1 Refill(s) ; Ordering Provider:   Raphael Molina MD; Catalog Code:   clobetasol topical ; Order Dt/Tm:   11/19/2019 1:39:55 PM CST          gabapentin  :   gabapentin ; Status:   Prescribed ; Ordered As Mnemonic:   gabapentin 100 mg oral capsule ; Simple Display Line:   200 mg, 2 cap(s), PO, bid, 360 cap(s), 1 Refill(s) ; Ordering Provider:   Raphael Molina MD; Catalog Code:   gabapentin ; Order Dt/Tm:   5/29/2019 9:52:50 AM CDT          losartan  :   losartan ; Status:   Prescribed ; Ordered As Mnemonic:   losartan 100 mg oral tablet ; Simple Display Line:   100 mg, 1 tab(s), PO, Daily, 90 tab(s), 1 Refill(s) ; Ordering Provider:   Raphael Molina MD; Catalog Code:   losartan ; Order Dt/Tm:   5/29/2019 9:51:09 AM CDT          metFORMIN  :   metFORMIN ; Status:   Prescribed ; Ordered As Mnemonic:   metFORMIN 500 mg oral tablet ; Simple Display Line:   500 mg, 1 tab(s), PO, BID, 180 tab(s), 1 Refill(s) ; Ordering Provider:   Raphael Molina MD; Catalog Code:   metFORMIN ; Order Dt/Tm:   5/29/2019 9:53:34 AM CDT          risperiDONE  :    risperiDONE ; Status:   Prescribed ; Ordered As Mnemonic:   risperiDONE 1 mg oral tablet ; Simple Display Line:   1.5 tab(s), Oral, daily, 135 EA, 1 Refill(s) ; Ordering Provider:   Raphael Molina MD; Catalog Code:   risperiDONE ; Order Dt/Tm:   5/29/2019 9:53:41 AM CDT

## 2022-02-16 NOTE — TELEPHONE ENCOUNTER
Entered by Ting Lopez CMA on March 12, 2019 7:37:13 AM CDT  ---------------------  From: Ting Lopez CMA   To: [facility]    Sent: 3/12/2019 7:37:13 AM CDT  Subject: Medication Management     ** Submitted: **  Order:risperiDONE (risperiDONE 1 mg oral tablet)  1.5 tab(s)  Oral  daily  Qty:  45 EA        Days Supply:  0        Refills:  0          Substitutions Allowed     Route To [facility]    Signed by Ting Lopez CMA  3/12/2019 7:36:00 AM    ** Submitted: **  Complete:risperiDONE (risperiDONE 1 mg oral tablet)   Signed by Ting Lopez CMA  3/12/2019 7:37:00 AM    ** Not Approved:  **  risperiDONE (RisperiDONE Tablet 1 MG)  Take 1&1/2 tablet by mouth daily  Qty:  45 EA        Days Supply:  0        Refills:  0          Substitutions Allowed     Route To [facility]   Signed by Ting Lopez CMA            ** Patient matched by Ting Lopez CMA on 3/12/2019 7:36:36 AM CDT **      ------------------------------------------  From: [facility]  To: Raphael Molina MD  Sent: March 11, 2019 5:18:58 PM CDT  Subject: Medication Management  Due: March 12, 2019 5:18:58 PM CDT    ** On Hold Pending Signature **  Drug: risperiDONE (risperiDONE 1 mg oral tablet)  Take 1&1/2 tablet by mouth daily  Quantity: 45 EA       Days Supply: 0         Refills: 0  Substitutions Allowed  Notes from Pharmacy:     Dispensed Drug: risperiDONE (risperiDONE 1 mg oral tablet)  Take 1&1/2 tablet by mouth daily  Quantity: 45 EA       Days Supply: 0         Refills: 0  Substitutions Allowed  Notes from Pharmacy:   ------------------------------------------

## 2022-02-16 NOTE — LETTER
(Inserted Image. Unable to display)     April 30, 2019      JAN SHIPLEY  [address]  [place], [place] [id number]          Dear JAN,      Thank you for selecting [facility] ([facility]viously [facility] for your healthcare needs.    Our records indicate you are due for the following services:    Diabetic Exam ~ Please bring your glucose meter and/or your blood glucose diary to your appointment.  Hypertension check ~ please remember to bring your at-home blood pressure readings with you to your appointment.    Urine Labs ~ Please be prepared to leave a urine specimen for evaluation.  Fasting Lab Tests ~ Please do not eat or drink anything 10 hours prior to your scheduled appointment time.  (Water and any medications that you may need are allowed unless directed otherwise.)    If you had your labs done at another facility or with Direct Access Lab Testing at [facility], please bring in a copy of the results to your next visit, mail a copy, or drop off a copy of your results to your Healthcare Provider.    You are due for lab work and an office visit; please schedule the lab appointment 1 week before the office visit.  This will assure all results are available to discuss with your provider during your visit.    **It is very helpful if you bring your medication bottles to your appointment.  This assures we have all of your current medications, including strength and dosing information, documented accurately in your medical record.    To schedule an appointment or if you have further questions, please contact your primary clinic:   [facility]       [phone]   [facility]       [phone]              [facility]     [phone]      Powered by [employer]    Sincerely,    Raphael Molina MD

## 2022-02-16 NOTE — LETTER
(Inserted Image. Unable to display)   [facility] [address]  May 30, 2019      JAN SHIPLEY  [address]  [place], [place] [id number]        Dear JAN,     Thank you for selecting [facility] for your healthcare needs. Below you will find the results of the recent tests done at our clinic.        All results are within acceptable limits. No treatment changes are recommended at this time.      Result Name Current Result Previous Result Reference Range   Sodium Level (mmol/L)  139 5/29/2019  137 10/29/2018 135 - 146   Potassium Level (mmol/L)  4.6 5/29/2019  4.3 10/29/2018 3.5 - 5.3   Chloride Level (mmol/L)  100 5/29/2019  100 10/29/2018 98 - 110   CO2 Level (mmol/L)  28 5/29/2019  27 10/29/2018 20 - 32   Glucose Level (mg/dL) ((H)) 158 5/29/2019 ((H)) 152 10/29/2018 65 - 99   BUN (mg/dL)  15 5/29/2019  16 10/29/2018 7 - 25   Creatinine Level (mg/dL)  0.71 5/29/2019  0.72 10/29/2018 0.60 - 0.93   BUN/Creat Ratio  NOT APPLICABLE 5/29/2019  NOT APPLICABLE 10/29/2018 6 - 22   eGFR (mL/min/1.73m2)  86 5/29/2019  85 10/29/2018 > OR = 60 -    eGFR  (mL/min/1.73m2)  100 5/29/2019  98 10/29/2018 > OR = 60 -    Calcium Level (mg/dL)  9.4 5/29/2019  9.3 10/29/2018 8.6 - 10.4   Bilirubin Total (mg/dL)  0.7 5/29/2019  0.8 10/29/2018 0.2 - 1.2   Alkaline Phosphatase (unit/L)  125 5/29/2019 ((H)) 133 10/29/2018 33 - 130   AST/SGOT (unit/L)  19 5/29/2019  19 10/29/2018 10 - 35   ALT/SGPT (unit/L)  16 5/29/2019  19 10/29/2018 6 - 29   Protein Total (gm/dL)  6.9 5/29/2019  7.2 10/29/2018 6.1 - 8.1   Albumin Level (gm/dL)  4.2 5/29/2019  4.1 10/29/2018 3.6 - 5.1   Globulin  2.7 5/29/2019  3.1 10/29/2018 1.9 - 3.7   A/G Ratio  1.6 5/29/2019  1.3 10/29/2018 1.0 - 2.5   Hgb A1c ((H)) 7.0 5/29/2019 ((H)) 6.6 10/29/2018  - <5.7   Cholesterol (mg/dL)  171 5/29/2019  180 10/29/2018  - <200   Non-HDL Cholesterol  125 5/29/2019 ((H)) 138 10/29/2018  - <130   HDL (mg/dL) ((L))  46 5/29/2019 ((L)) 42 10/29/2018 >50 -    Cholesterol/HDL Ratio  3.7 5/29/2019  4.3 10/29/2018  - <5.0   LDL  98 5/29/2019 ((H)) 104 10/29/2018    Triglyceride (mg/dL) ((H)) 168 5/29/2019 ((H)) 226 10/29/2018  - <150   WBC  6.6 5/29/2019  7.5 10/29/2018 3.8 - 10.8   RBC  4.73 5/29/2019  4.72 10/29/2018 3.80 - 5.10   Hgb (gm/dL)  15.0 5/29/2019  15.0 10/29/2018 11.7 - 15.5   Hct (%)  43.1 5/29/2019  42.5 10/29/2018 35.0 - 45.0   MCV (fL)  91.1 5/29/2019  90.0 10/29/2018 80.0 - 100.0   MCH (pg)  31.7 5/29/2019  31.8 10/29/2018 27.0 - 33.0   MCHC (gm/dL)  34.8 5/29/2019  35.3 10/29/2018 32.0 - 36.0   RDW (%)  12.3 5/29/2019  12.1 10/29/2018 11.0 - 15.0   Platelet  272 5/29/2019  300 10/29/2018 140 - 400   MPV (fL)  10.0 5/29/2019  10.7 10/29/2018 7.5 - 12.5       Please contact my practice at [phone]  if you have any questions or concerns.     Sincerely,        Raphael Molina MD          What do your labs mean?  Below is a glossary of commonly ordered labs:  LDL   Bad Cholesterol   HDL   Good Cholesterol  AST/ALT   Liver Function   Cr/Creatinine   Kidney Function  Microalbumin   Kidney Function  BUN   Kidney Function  PSA   Prostate    TSH   Thyroid Hormone  HgbA1c   Diabetes Test   Hgb (Hemoglobin)   Red Blood Cells

## 2022-02-16 NOTE — PROGRESS NOTES
Patient:   JAN SHIPLEY            MRN: [id number]            FIN: [id number]               Age:   71 years     Sex:  Female     :  1950   Associated Diagnoses:   Depression, major, recurrent, in complete remission; Diabetes Mellitus; Diabetic Neuropathy; Left shoulder pain; Morbid Obesity; Primary osteoarthritis; Right hip pain   Author:   Ligia Arias      Visit Information      Date of Service: 2021 01:55 pm  Performing Location: [facility]  Encounter#: [id number]      Primary Care Provider (PCP):  NONE ,       Referring Provider:  Ligia Arias    NPI# [id number]   Visit type:  General concerns.    Accompanied by:  No one.    Source of history:  Self.       Chief Complaint   2021 2:27 PM CDT    1) establish care with new primary 2) chronic disease visit 3) son called with some concerns about some cognitive issues 4) left shoulder pain 5) pain mid/lower back on right side   diabetes check      History of Present Illness                   The patient presents for follow-up evaluation of diabetes.     She states she recognizes me as a provider she has seen before  here for complaints that she has written on her paper with her     The patient tells me her brother is in the waiting room and that he brought her; she does not drive.     1)  She wants to talk about pain in her back on the right side.  She feels this has been present for one to two weeks.  She denies any fall.  When I asked her about her osteoarthritis, she was surprised that she has diagnosis of osteoarthritis.  She cannot tell me what she has been using for it, although her daughter had left her with some pills.  She denies any history of joint replacement.       2)  She also has left shoulder pain that has been present for an unknown amount of time.  She again denies any fall.  She is point tender on the lateral aspect of the shoulder and specifically over the AC joint.  She is agreeable to trying  some physical therapy for that.       3)  She is here for med check and needs her medications refilled.  She tells me she is in charge of taking her medicines.       4)  She does have some diagnosed neuropathy and her exam today shows that is intermittent on both feet and she does have some sensation.  She denies that the neuropathy keeps her up at night.  She is due for A1c; which have been under good control.  Later in the visit when we were talking about diabetes, she was very surprised that she has a diagnosis of diabetes and wonders how long she has had that.      5)  During the visit I asked if she would go over the mini mental status form with me because I had some concerns now that she is living alone.  She tells me her daughter and her 8-year-old grandson will be moving out.  As we start to complete the MMSE she cannot tell me what city she lives in.  She cannot tell me her address.  She does not know the day of the week initially and she certainly does not know the month, season, or year.  She could do some of the more physical parts of the exam and fold the paper in half and put it on the floor, and she was able to copy the picture and earn 1 point from that.       I had received a call from her son, Lv, although I did not share this information with her because of his worries that she cannot live alone.  Today her brother, Misael, is with her for part of the exam and recognizes also that she maybe should not be living alone and he is considering living with her.           Review of Systems   Constitutional:  No fever, No chills.    Eye:  No recent visual problem.    Ear/Nose/Mouth/Throat:  No nasal congestion, No sore throat.    Respiratory:  No shortness of breath, No cough.    Cardiovascular:  No chest pain.    Gastrointestinal:  No nausea, No vomiting.    Genitourinary   Hematology/Lymphatics   Endocrine   Immunologic   Musculoskeletal   Integumentary:  No rash.    Neurologic:  Alert and oriented  X4.    Psychiatric:  Negative.    All other systems reviewed and negative      Health Status   Allergies:    Allergic Reactions (Selected)  No Known Medication Allergies   Medications:  (Selected)   Prescriptions  Prescribed  CeleBREX 200 mg oral capsule: = 1 cap(s) ( 200 mg ), PO, Daily, # 90 cap(s), 3 Refill(s), Type: Maintenance, Pharmacy: [facility], 1 cap(s) Oral daily, 71.75, in, 01/19/21 10:44:00 CST, Height Measured, 308, lb, 01/19/21 10:44:00 CST, Weight Measured  atorvastatin 20 mg oral tablet: See Instructions, Instructions: TAKE ONE (1) TABLET DAILY, # 90 tab(s), 0 Refill(s), Type: Maintenance, Pharmacy: [facility], Pt due for visit for further refills, TAKE ONE (1) TABLET DAILY, 71.75, in, 01/19/21 10:44:00 CST, Height Measured, 3...  citalopram 40 mg oral tablet: See Instructions, Instructions: ONE (1) TAB(S) ORAL DAILY, # 90 tab(s), 0 Refill(s), Type: Maintenance, Pharmacy: [facility], ONE (1) TAB(S) ORAL DAILY, 71.75, in, 01/19/21 10:44:00 CST, Height Measured, 308, lb, 01/19/21 10:44:00 CST, Weight...  gabapentin 100 mg oral capsule: = 2 cap(s) ( 200 mg ), PO, bid, # 360 cap(s), 1 Refill(s), Type: Maintenance, Pharmacy: [facility], 2 cap(s) Oral bid, 71.75, in, 01/19/21 10:44:00 CST, Height Measured, 308, lb, 01/19/21 10:44:00 CST, Weight Measured  losartan 100 mg oral tablet: See Instructions, Instructions: ONE (1) TAB(S) ORAL DAILY, # 90 tab(s), 0 Refill(s), Type: Maintenance, Pharmacy: [facility], ONE (1) TAB(S) ORAL DAILY, 71.75, in, 01/19/21 10:44:00 CST, Height Measured, 308, lb, 01/19/21 10:44:00 CST, Weight...  metFORMIN 500 mg oral tablet: See Instructions, Instructions: ONE (1) TAB(S) ORAL TWICE DAILY, # 180 tab(s), 0 Refill(s), Type: Maintenance, Pharmacy: [facility], Pt due for appt for further refills, ONE (1) TAB(S) ORAL TWICE DAILY, 71.75, in, 01/19/21 10:44:00 CST, Height...  risperiDONE 1 mg oral tablet: = 1.5 tab(s), Oral, daily, #  135 tab(s), 1 Refill(s), Type: Maintenance, Pharmacy: [facility], 1.5 tab(s) Oral daily, 71.75, in, 01/19/21 10:44:00 CST, Height Measured, 308, lb, 01/19/21 10:44:00 CST, Weight Measured,    Medications          *denotes recorded medication          atorvastatin 20 mg oral tablet: See Instructions, TAKE ONE (1) TABLET DAILY, 90 tab(s), 0 Refill(s).          CeleBREX 200 mg oral capsule: 200 mg, 1 cap(s), PO, Daily, 90 cap(s), 3 Refill(s).          citalopram 40 mg oral tablet: See Instructions, ONE (1) TAB(S) ORAL DAILY, 90 tab(s), 0 Refill(s).          gabapentin 100 mg oral capsule: 200 mg, 2 cap(s), PO, bid, 360 cap(s), 1 Refill(s).          losartan 100 mg oral tablet: See Instructions, ONE (1) TAB(S) ORAL DAILY, 90 tab(s), 0 Refill(s).          metFORMIN 500 mg oral tablet: See Instructions, ONE (1) TAB(S) ORAL TWICE DAILY, 180 tab(s), 0 Refill(s).          risperiDONE 1 mg oral tablet: 1.5 tab(s), Oral, daily, 135 tab(s), 1 Refill(s).       Problem list:    All Problems  Depression, major, recurrent, in complete remission / ICD-9-.36 / Confirmed  Diabetes Mellitus / ICD-9- / Confirmed  Diabetic Neuropathy / ICD-9-.60 / Confirmed  Long term current use of oral hypoglycemic drug / SNOMED CT 429003310 / Confirmed  Hypertension / ICD-9-.91 / Confirmed  Primary osteoarthritis / SNOMED CT 273139493 / Confirmed  Colon cancer / SNOMED CT 120184701 / Confirmed  Morbid Obesity / ICD-9-.01 / Probable  Hyperlipidemia NOS / SNOMED CT 715773845 / Confirmed      Histories   Past Medical History:    Active  Morbid Obesity (278.01)  Hyperlipidemia NOS (366034206)  Hypertension (997.91)  Diabetes Mellitus (250)  Depression, major, recurrent, in complete remission (296.36)  Diabetic Neuropathy (250.60)  Colon cancer (575804036)   Family History:    Diabetes mellitus  Father  Brother  Grandmother (P)  Hypertension  Grandmother (M)  Heart disease  Grandfather (P)  Father  Grandmother  (M)  Substance abuse  Father     Procedure history:    Colonoscopy (SNOMED CT 478484982) performed by Leela Crooks MD on 2016 at 65 Years.  Comments:  2016 8:02 AM CDT - Moon Alicia  Indication:  Follow up previous lesion.  Sedation:  MAC  Impression:  Diverticulosis of lg int w/o perforation or abscess with bl.  Personal history of malignant neoplasm of large in.  Benign neoplasm of ascending colon.  Colonoscopy (SNOMED CT 788176338) performed by Leela Crooks MD on 10/23/2015 at 65 Years.  Comments:  2015 8:43 AM CST - Moon Alicia  Indication:  History of colon cancer.  Sedation: MAC  sigmoid colon resection in  at 64 Years.  gall bladder removed.  tubes tied.  appendectomy.  benign lump from back of neck removed.   x 3.   Social History:        Electronic Cigarette/Vaping Assessment            Electronic Cigarette Use: Never.      Alcohol Assessment: Denies Alcohol Use            Never      Tobacco Assessment: Past            Former smoker, quit more than 30 days ago            Past      Substance Abuse Assessment            Never      Employment and Education Assessment            Retired      Home and Environment Assessment            Marital status: .  Lives with Self.  3 children.  Living situation: Home/Independent.  ,        Electronic Cigarette/Vaping Assessment            Electronic Cigarette Use: Never.      Alcohol Assessment: Denies Alcohol Use            Never      Tobacco Assessment: Past            Former smoker, quit more than 30 days ago            Past      Substance Abuse Assessment            Never      Employment and Education Assessment            Retired      Home and Environment Assessment            Marital status: .  Lives with Self.  3 children.  Living situation: Home/Independent.        Physical Examination   Vital Signs   2021 2:27 PM CDT Temperature Tympanic 98.3 DegF    Peripheral Pulse Rate 73 bpm    Systolic Blood Pressure 156 mmHg   HI    Diastolic Blood Pressure 80 mmHg    Mean Arterial Pressure 105 mmHg    BP Site Right arm    BP Method Manual    Oxygen Saturation 94 %      Point of care testing:  scores 8/30 on MMSE.    General:  No acute distress.    Eye:  Pupils are equal, round and reactive to light, Extraocular movements are intact, Normal conjunctiva.    HENT:  Normocephalic.    Neck:  Supple, Non-tender, No lymphadenopathy.    Respiratory:  Lungs are clear to auscultation, Respirations are non-labored, Breath sounds are equal.    Cardiovascular:  Normal rate, Regular rhythm, No murmur, Good pulses equal in all extremities, Normal peripheral perfusion.    Gastrointestinal:  Soft, Non-tender, Non-distended, No organomegaly.    Musculoskeletal:  tenderness over right SI joint and hip, no bruising. She can pull the knee to chest  fairly well. SHe has trouble moving sit to stand.    Integumentary:  Warm, Pink, Moist, No rash, feet examined, some neuropathy, no break in skin.    Neurologic:  Alert, Not oriented.    Psychiatric:  Cooperative.       Impression and Plan   Diagnosis     Depression, major, recurrent, in complete remission (QVT42-UC F33.42).     Diabetes Mellitus (YPC65-PW E11.9).     Diabetic Neuropathy (EFV51-ZJ E11.40).     Left shoulder pain (STV75-WE M25.512).     Morbid Obesity (KQT42-GO E66.01).     Primary osteoarthritis (AAR50-GY M19.91).     Right hip pain (WRW84-OB M25.551).     Orders     Orders (Selected)   Prescriptions  Prescribed  CeleBREX 200 mg oral capsule: = 1 cap(s) ( 200 mg ), PO, Daily, # 90 cap(s), 3 Refill(s), Type: Maintenance, Pharmacy: [facility], 1 cap(s) Oral daily, 71.75, in, 08/16/21 14:27:00 CDT, Height Measured, 306.2, lb, 08/16/21 14:27:00 CDT, Weight Measured  atorvastatin 20 mg oral tablet: See Instructions, Instructions: TAKE ONE (1) TABLET DAILY, # 90 EA, 0 Refill(s), Type: Maintenance, Pharmacy: [facility], TAKE ONE (1) TABLET DAILY, 71.75, in, 08/16/21 14:27:00 CDT, Height  Measured, 306.2, lb, 08/16/21 14:27:00 CDT, Weight Me...  citalopram 40 mg oral tablet: See Instructions, Instructions: ONE (1) TAB(S) ORAL DAILY, # 90 EA, 3 Refill(s), Type: Maintenance, Pharmacy: [facility], ONE (1) TAB(S) ORAL DAILY, 71.75, in, 08/16/21 14:27:00 CDT, Height Measured, 306.2, lb, 08/16/21 14:27:00 CDT, Weight Me...  gabapentin 100 mg oral capsule: = 2 cap(s) ( 200 mg ), PO, bid, # 360 cap(s), 3 Refill(s), Type: Maintenance, Pharmacy: [facility], 2 cap(s) Oral bid, 71.75, in, 08/16/21 14:27:00 CDT, Height Measured, 306.2, lb, 08/16/21 14:27:00 CDT, Weight Measured  losartan 100 mg oral tablet: See Instructions, Instructions: ONE (1) TAB(S) ORAL DAILY, # 90 EA, 1 Refill(s), Type: Maintenance, Pharmacy: [facility], ONE (1) TAB(S) ORAL DAILY, 71.75, in, 08/16/21 14:27:00 CDT, Height Measured, 306.2, lb, 08/16/21 14:27:00 CDT, Weight Me...  metFORMIN 500 mg oral tablet: See Instructions, Instructions: ONE (1) TAB(S) ORAL TWICE DAILY, # 180 tab(s), 1 Refill(s), Type: Maintenance, Pharmacy: [facility], Pt due for appt for further refills, ONE (1) TAB(S) ORAL TWICE DAILY, 71.75, in, 08/16/21 14:27:00 CDT, Height...  risperiDONE 1 mg oral tablet: = 1.5 tab(s), Oral, daily, # 135 tab(s), 3 Refill(s), Type: Maintenance, Pharmacy: [facility], 1.5 tab(s) Oral daily, 71.75, in, 08/16/21 14:27:00 CDT, Height Measured, 306.2, lb, 08/16/21 14:27:00 CDT, Weight Measured.       Patient Instructions:       Counseled: Right hip x-ray shows some arthritic changes in the joint as well as the SI joint and she will continue Celebrex for this.  Her pain is fairly significant and she was in a wheelchair for much of her visit today.       She will receive therapy for her left shoulder pain.     I will contact her son to update him on concerns I have about her memory.    I asked her brother to set her up with Dr. Faulkner, whom her brother follows with.  I would like her more formally  evaluated for some dementia and he agrees to that plan.  , over 45 min with pt in eval/counseling and coordination of care.    Dx and Plan   Counseled:  Patient, Regarding medications, Diet.         Regarding treatment: Diabetes.

## 2022-02-16 NOTE — TELEPHONE ENCOUNTER
Entered by Harshil Mejía CMA on August 11, 2021 2:06:52 PM CDT  ---------------------  From: Harshil Mejía CMA   To: [facility]    Sent: 8/11/2021 2:06:52 PM CDT  Subject: Medication Management     ** Submitted: **  Order:losartan (losartan 100 mg oral tablet)  See Instructions  ONE (1) TAB(S) ORAL DAILY  Qty:  90 tab(s)        Days Supply:  90        Refills:  0          Substitutions Allowed     Route To [facility]    Signed by Harshil Mejía CMA  8/11/2021 7:06:00 PM [facility]    ** Submitted: **  Complete:losartan (losartan 100 mg oral tablet)   Signed by Harshil Mejía CMA  8/11/2021 7:06:00 PM [facility]    ** Not Approved:  **  losartan (LOSARTAN POT 100MG)  ONE (1) TAB(S) ORAL DAILY  Qty:  90 tab(s)        Days Supply:  90        Refills:  0          Substitutions Allowed     Route To [facility]   Signed by Harshil Mejía CMA            ** Submitted: **  Order:citalopram (citalopram 40 mg oral tablet)  See Instructions  ONE (1) TAB(S) ORAL DAILY  Qty:  90 tab(s)        Days Supply:  90        Refills:  0          Substitutions Allowed     Route To [facility]    Signed by Harshil Mejía CMA  8/11/2021 7:06:00 PM [facility]    ** Submitted: **  Complete:citalopram (citalopram 40 mg oral tablet)   Signed by Harshil Mejía CMA  8/11/2021 7:06:00 PM [facility]    ** Not Approved:  **  citalopram (CITALOPRAM 40MG)  ONE (1) TAB(S) ORAL DAILY  Qty:  90 tab(s)        Days Supply:  90        Refills:  0          Substitutions Allowed     Route To [facility]   Signed by Harshil Mejía CMA            ** Submitted: **  Order:atorvastatin (atorvastatin 20 mg oral tablet)  See Instructions  TAKE ONE (1) TABLET DAILY  Qty:  90 tab(s)        Days Supply:  90        Refills:  0          Substitutions Allowed     Route To [facility]    Signed by Harshil Mejía CMA  8/11/2021 7:05:00 PM [facility]    ** Submitted: **  Complete:atorvastatin (atorvastatin 20  mg oral tablet)   Signed by Harshil Mejía CMA  8/11/2021 7:06:00 PM [facility]    ** Not Approved:  **  atorvastatin (ATORVASTATIN 20MG)  TAKE ONE (1) TABLET DAILY  Qty:  90 tab(s)        Days Supply:  90        Refills:  0          Substitutions Allowed     Route To Pharmacy - [facility]   Signed by Harshil Mejía CMA            ** Patient matched by Harshil Mejía CMA on 8/11/2021 2:02:36 PM CDT **      ------------------------------------------  From: [facility]  To: Raphael Hsu PA-C  Sent: August 10, 2021 9:16:17 AM CDT  Subject: Medication Management  Due: August 10, 2021 11:05:58 PM CDT     ** On Hold Pending Signature **     Drug: citalopram (citalopram 40 mg oral tablet), ONE (1) TAB(S) ORAL DAILY  Quantity: 90 tab(s)  Days Supply: 90  Refills: 0  Substitutions Allowed  Notes from Pharmacy:     Dispensed Drug: citalopram (citalopram 40 mg oral tablet), ONE (1) TAB(S) ORAL DAILY  Quantity: 90 tab(s)  Days Supply: 90  Refills: 0  Substitutions Allowed  Notes from Pharmacy:     ** On Hold Pending Signature **     Drug: losartan (losartan 100 mg oral tablet), ONE (1) TAB(S) ORAL DAILY  Quantity: 90 tab(s)  Days Supply: 90  Refills: 0  Substitutions Allowed  Notes from Pharmacy:     Dispensed Drug: losartan (losartan 100 mg oral tablet), ONE (1) TAB(S) ORAL DAILY  Quantity: 90 tab(s)  Days Supply: 90  Refills: 0  Substitutions Allowed  Notes from Pharmacy:     ** On Hold Pending Signature **     Drug: atorvastatin (atorvastatin 20 mg oral tablet), TAKE ONE (1) TABLET DAILY  Quantity: 90 tab(s)  Days Supply: 90  Refills: 0  Substitutions Allowed  Notes from Pharmacy:     Dispensed Drug: atorvastatin (atorvastatin 20 mg oral tablet), TAKE ONE (1) TABLET DAILY  Quantity: 90 tab(s)  Days Supply: 90  Refills: 0  Substitutions Allowed  Notes from Pharmacy:  ------------------------------------------Med Refill      Date of last office visit and reason:  1/19/21      Date of last Med Check / Px:     Date of  last labs pertaining to med:  1/19/21    Note:  Rx filled per protocol.  Pt has upcoming appt 8/16/21 with [facility]    RTC order in chart:  yes    For Protocol refill, has patient been contacted:  _

## 2022-02-16 NOTE — PROGRESS NOTES
Patient:   JAN SHIPLEY            MRN: [id number]            FIN: [id number]               Age:   70 years     Sex:  Female     :  1950   Associated Diagnoses:   Diabetes Mellitus; Primary osteoarthritis   Author:   Ligia Arias      Visit Information      Date of Service: 2020 09:12 am  Performing Location: [facility]  Encounter#: [id number]      Primary Care Provider (PCP):  Raphael Molina MD    NPI# [id number]      Referring Provider:  Ligia Arias    NPI# [id number]   Visit type:  video.    Participants in room during visit:  _pt and her daughter   Location of patient:  _home  Location of provider:  _ home  Video Start Time:  10:35  Video End Time:   _10:50    Today's visit was conducted via video conference due to the COVID-19 pandemic.  The patient's consent to proceed with a video visit has been obtained and documented.      Chief Complaint   2020 10:13 AM CDT    c/o chronic Lt shoulder/arm pain and back pain. Verbal consent given for VIDEO visit- daughter Dominique's phone [phone]        History of Present Illness   Patient is a 70_ year old female_ who is being evaluated via a billable video visit.  Jan is having pain in upper back between the shoulder blades, sometimes it shoots down the left arm to the elbow. It has been intermit for one month. Has used no meds or ice or heat for it.  In fact, after much conversation, it is revealed the pain started shortly after she quit all her meds. She ran out and just didn't bother to get refilled. She was confused about why Dr Molina wasnt in the clinic in [place] anymore.  She has not been on any HTN, DM or pain meds. She has osteoarthritis   She is having no SOB or chest pain, just doesn't feel good. She had also been on gabapentin for pain and 2 meds for depression that she quit abruptly      Health Status   Allergies:    Allergic Reactions (Selected)  No Known Medication Allergies   Medications:   (Selected)   Prescriptions  Prescribed  CeleBREX 200 mg oral capsule: = 1 cap(s) ( 200 mg ), PO, Daily, # 90 cap(s), 1 Refill(s), Type: Maintenance, Pharmacy: [facility], 1 cap(s) Oral daily  atorvastatin 20 mg oral tablet: = 1 tab(s) ( 20 mg ), PO, Daily, # 90 tab(s), 1 Refill(s), Type: Maintenance, Pharmacy: [facility], Needs to be seen for more refills., 1 tab(s) Oral daily  citalopram 40 mg oral tablet: = 1 tab(s) ( 40 mg ), PO, Daily, # 90 tab(s), 1 Refill(s), Type: Maintenance, Pharmacy: [facility], Needs to be seen for more refills., 1 tab(s) Oral daily  clobetasol 0.05% topical cream: 1 chichi, Topical, bid, # 30 gm, 1 Refill(s), Type: Maintenance, Pharmacy: [facility], 1 chichi Topical bid  gabapentin 100 mg oral capsule: = 2 cap(s) ( 200 mg ), PO, bid, # 360 cap(s), 1 Refill(s), Type: Maintenance, Pharmacy: [facility], Needs to be seen for more refills., 2 cap(s) Oral bid  losartan 100 mg oral tablet: = 1 tab(s) ( 100 mg ), PO, Daily, # 90 tab(s), 1 Refill(s), Type: Maintenance, Pharmacy: [facility], 1 tab(s) Oral daily  metFORMIN 500 mg oral tablet: = 1 tab(s) ( 500 mg ), PO, BID, # 180 tab(s), 1 Refill(s), Type: Maintenance, Pharmacy: [facility], Needs to be seen for more refills., 1 tab(s) Oral bid  risperiDONE 1 mg oral tablet: = 1.5 tab(s), Oral, daily, # 135 EA, 1 Refill(s), Type: Maintenance, Pharmacy: [facility], Pt due this month for appt, 1.5 tab(s) Oral daily,    Medications          *denotes recorded medication          atorvastatin 20 mg oral tablet: 20 mg, 1 tab(s), PO, Daily, 90 tab(s), 1 Refill(s).          CeleBREX 200 mg oral capsule: 200 mg, 1 cap(s), PO, Daily, 90 cap(s), 1 Refill(s).          citalopram 40 mg oral tablet: 40 mg, 1 tab(s), PO, Daily, 90 tab(s), 1 Refill(s).          clobetasol 0.05% topical cream: 1 chichi, Topical, bid, 30 gm, 1 Refill(s).          gabapentin 100 mg oral capsule: 200 mg, 2 cap(s), PO, bid, 360  cap(s), 1 Refill(s).          losartan 100 mg oral tablet: 100 mg, 1 tab(s), PO, Daily, 90 tab(s), 1 Refill(s).          metFORMIN 500 mg oral tablet: 500 mg, 1 tab(s), PO, BID, 180 tab(s), 1 Refill(s).          risperiDONE 1 mg oral tablet: 1.5 tab(s), Oral, daily, 135 EA, 1 Refill(s).       Problem list:    All Problems  Colon cancer / SNOMED CT 448502494 / Confirmed  Depression, major, recurrent, in complete remission / ICD-9-.36 / Confirmed  Diabetes Mellitus / ICD-9- / Confirmed  Diabetic Neuropathy / ICD-9-.60 / Confirmed  Hyperlipidemia NOS / SNOMED CT 335019583 / Confirmed  Hypertension / ICD-9-.91 / Confirmed  Long term current use of oral hypoglycemic drug / SNOMED CT 469600131 / Confirmed  Morbid Obesity / ICD-9-.01 / Probable  Primary osteoarthritis / SNOMED CT 667642109 / Confirmed      Histories   Past Medical History:    Active  Morbid Obesity (278.01)  Hyperlipidemia NOS (747095176)  Hypertension (997.91)  Diabetes Mellitus (250)  Depression, major, recurrent, in complete remission (296.36)  Diabetic Neuropathy (250.60)  Colon cancer (283911470)   Family History:    Diabetes mellitus  Father  Brother  Grandmother (P)  Hypertension  Grandmother (M)  Heart disease  Grandfather (P)  Father  Grandmother (M)  Substance abuse  Father     Procedure history:    Colonoscopy (SNOMED CT 909366358) performed by Leela Crooks MD on 5/13/2016 at 65 Years.  Comments:  5/26/2016 8:02 AM LISHA - Moon Alicia  Indication:  Follow up previous lesion.  Sedation:  MAC  Impression:  Diverticulosis of lg int w/o perforation or abscess with bl.  Personal history of malignant neoplasm of large in.  Benign neoplasm of ascending colon.  Colonoscopy (SNOMED CT 834106518) performed by Leela Crooks MD on 10/23/2015 at 65 Years.  Comments:  11/12/2015 8:43 AM Moon Cortes  Indication:  History of colon cancer.  Sedation: MAC  sigmoid colon resection in 2014 at 64 Years.  gall bladder  removed.  tubes tied.  appendectomy.  benign lump from back of neck removed.   x 3.   Social History:        Alcohol Assessment: Denies Alcohol Use            Never      Tobacco Assessment: Past            Past      Substance Abuse Assessment            Never      Employment and Education Assessment            Retired      Home and Environment Assessment            Marital status: .  Lives with Self.  3 children.  Living situation: Home/Independent.        Physical Examination   General:  Alert and oriented, No acute distress.    Eye:  Normal conjunctiva.    Respiratory:  Respirations are non-labored.    Musculoskeletal:  she points to 'my bra line' in the back where pain is present.    Psychiatric:  Cooperative, Appropriate mood & affect, Normal judgment.       Impression and Plan   Diagnosis     Diabetes Mellitus (JAH13-VI E11.9).     Primary osteoarthritis (HSQ22-ZR M19.91).     Plan:   suspect she doesn't feel well because she stopped her depression and pain meds abruptly  I asked her to get back on ALL her medication, I will send refills to pharmacy  I asked her to schedule lab only appt in 10 weeks then f/u video appt 2 days later  IF her back paindoes not respond in the next week to restart of the Celebrex, she should come to clinic for further eval of other etiology and she is in agreement.    Patient Instructions:       Counseled: Patient, Family.    Orders     Orders (Selected)   Outpatient Orders  Ordered  Return to Clinic (Request): RFV: lab and VS--A1C. and urine microalbuin for diabetes. APpt 1 week later for med check should be video, Return in 10 weeks  Prescriptions  Prescribed  CeleBREX 200 mg oral capsule: = 1 cap(s) ( 200 mg ), PO, Daily, # 90 cap(s), 0 Refill(s), Type: Maintenance, Pharmacy: [facility], 1 cap(s) Oral daily, 71.75, in, 19 9:20:00 CST, Height Measured, 326, lb, 19 9:20:00 CST, Weight Measured  atorvastatin 20 mg oral tablet: = 1 tab(s) ( 20 mg ), PO,  Daily, # 90 tab(s), 0 Refill(s), Type: Maintenance, Pharmacy: [facility], 1 tab(s) Oral daily, 71.75, in, 12/11/19 9:20:00 CST, Height Measured, 326, lb, 12/11/19 9:20:00 CST, Weight Measured  citalopram 40 mg oral tablet: = 1 tab(s) ( 40 mg ), PO, Daily, # 90 tab(s), 0 Refill(s), Type: Maintenance, Pharmacy: [facility], 1 tab(s) Oral daily, 71.75, in, 12/11/19 9:20:00 CST, Height Measured, 326, lb, 12/11/19 9:20:00 CST, Weight Measured  gabapentin 100 mg oral capsule: = 2 cap(s) ( 200 mg ), PO, bid, # 360 cap(s), 0 Refill(s), Type: Maintenance, Pharmacy: [facility], 2 cap(s) Oral bid, 71.75, in, 12/11/19 9:20:00 CST, Height Measured, 326, lb, 12/11/19 9:20:00 CST, Weight Measured  losartan 100 mg oral tablet: = 1 tab(s) ( 100 mg ), PO, Daily, # 90 tab(s), 0 Refill(s), Type: Maintenance, Pharmacy: [facility], 1 tab(s) Oral daily, 71.75, in, 12/11/19 9:20:00 CST, Height Measured, 326, lb, 12/11/19 9:20:00 CST, Weight Measured  metFORMIN 500 mg oral tablet: = 1 tab(s) ( 500 mg ), PO, BID, # 180 tab(s), 0 Refill(s), Type: Maintenance, Pharmacy: [facility], ., 1 tab(s) Oral bid, 71.75, in, 12/11/19 9:20:00 CST, Height Measured, 326, lb, 12/11/19 9:20:00 CST, Weight Measured  risperiDONE 1 mg oral tablet: = 1.5 tab(s), Oral, daily, # 135 EA, 0 Refill(s), Type: Maintenance, Pharmacy: [facility], 1.5 tab(s) Oral daily, 71.75, in, 12/11/19 9:20:00 CST, Height Measured, 326, lb, 12/11/19 9:20:00 CST, Weight Measured.

## 2022-02-16 NOTE — TELEPHONE ENCOUNTER
---------------------  From: Nasreen TSEBrissa   Sent: 2021 11:00:32 AM CST  Subject: med refills     PCP:   PATRIC     Time of Call:  1025       Person Calling:  Dominique pt's daughter  Phone number:  [phone] (I think this is a wrong number because VM was for a Adebayo)    Returned call at: [phone]    Note:   LM stating that she knows pt hasn't seen PATRIC in a long time but she needs her medications refilled to .     I will fill 30 day supply per protocol and note on Rx that she needs labs and annual visit prior to additional refills.     I called and LVMTCB on unidentified VM listed in pt's chart.    Last office visit and reason:  20 hip pain, last med check and labs: 2019

## 2022-02-16 NOTE — LETTER
(Inserted Image. Unable to display)     December 02, 2019      JAN SHIPLEY  [address]  [place], [place] [id number]          Dear JAN,      Thank you for selecting [facility] (previously [facility]) for your healthcare needs.    Our records indicate you are due for the following services:    Diabetic Exam ~ Please bring your glucose meter and/or your blood glucose diary to your appointment.  Hypertension check ~ please remember to bring your at-home blood pressure readings with you to your appointment.    Urine Labs ~ Please be prepared to leave a urine specimen for evaluation.  Fasting Lab Tests ~ Please do not eat or drink anything 10 hours prior to your scheduled appointment time.  (Water and any medications that you may need are allowed unless directed otherwise.)    If you had your labs done at another facility or with Direct Access Lab Testing at [facility], please bring in a copy of the results to your next visit, mail a copy, or drop off a copy of your results to your Healthcare Provider.    You are due for lab work and an office visit; please schedule the lab appointment 1 week before the office visit.  This will assure all results are available to discuss with your provider during your visit.    **It is very helpful if you bring your medication bottles to your appointment.  This assures we have all of your current medications, including strength and dosing information, documented accurately in your medical record.    To schedule an appointment or if you have further questions, please contact your primary clinic:   [facility]       [phone]   [facility]       [phone]              [facility]     [phone]      Powered by [employer]    Sincerely,    Raphael Molina MD

## 2022-02-16 NOTE — LETTER
(Inserted Image. Unable to display)   [address] [facility] [address]  June 03, 2019      JAN SHIPLEY  [address]  [place], [place] [id number]        Dear JAN,     Thank you for selecting [facility] for your healthcare needs. Below you will find the results of the recent tests done at our clinic.      The Urine Culture was NEGATIVE for infection.      Result Name Current Result   Culture Urine  See comment 5/29/2019       Please contact my practice at [phone]  if you have any questions or concerns.     Sincerely,        Raphael Molina MD          What do your labs mean?  Below is a glossary of commonly ordered labs:  LDL   Bad Cholesterol   HDL   Good Cholesterol  AST/ALT   Liver Function   Cr/Creatinine   Kidney Function  Microalbumin   Kidney Function  BUN   Kidney Function  PSA   Prostate    TSH   Thyroid Hormone  HgbA1c   Diabetes Test   Hgb (Hemoglobin)   Red Blood Cells

## 2022-02-16 NOTE — TELEPHONE ENCOUNTER
---------------------  From: Dania Griffith LPN (Phone Messages Pool ([id number]_[facility]))   To: [facility] Message Pool ([id number]_[facility]);     Sent: 8/16/2021 4:53:50 PM CDT  Subject: General Message       Phone Message Template      PCP:  none, visit today with NCB       Time of Call:  4:45       Person Calling:  Lv Vazquez  Phone number:  [phone]    Returned call at: _    Note:   Son, Lv called regarding patient, was wondering if NCB has tried to contact him regarding patient and concerns from appt today. Lv said that NCB could call him with any question at number provided.     Last office visit and reason:  _---------------------  From: Dominique Lopez LPN ([employer] Message Pool ([id number]_[facility]))   To: Ligia Arias;     Sent: 8/16/2021 5:31:31 PM CDT  Subject: FW: General Message---------------------  From: Ligia Arias   To: [facility] Message Pool ([id number]_[facility]);     Sent: 8/16/2021 6:38:06 PM CDT  Subject: RE: General Message     please forward this message to health information. Patient currently with significant orientation concerns. I had to speak to her son Lv nicholson to create a safety plan.  Marina would like someone from Medical records to contact him as he lives out of town at phone [phone] or email [email or web address] so that he can be sent forms to allow release of information. Please also send him forms to release previous medical records from her general practitioner in the [facility] and psychiatry if she has one. The only records I can find in our chart are from Gi and oncology. Thanks.---------------------  From: Dominique Lopez LPN ([employer] Message Pool ([id number]_[facility]))   To: Beckie Granados; Yue Hollins;     Sent: 8/17/2021 11:21:22 AM CDT  Subject: FW: General Message---------------------  From: Beckie Granados   To: [facility] Message Pool ([id number]_[facility]);     Sent: 8/18/2021 9:58:04 AM CDT  Subject: RE: General Message     I  have emailed Lv the forms. thanks

## 2022-02-16 NOTE — NURSING NOTE
Med Refill    Date of last office visit and reason:  4/11/18      Date of last Med Check / Px:   4/11/18  Date of last labs pertaining to med:  4/11/18    RTC order in chart:  yes; due now    For Protocol refill, has patient been contacted:  Called & notified pt. Will come in next week for an apt but out of medication now. Would like a 30 day supply to get her by. Rx's sent to pharmacy & apt made.

## 2022-02-16 NOTE — TELEPHONE ENCOUNTER
---------------------  From: Ting Lopez CMA   To: Raphael Molina MD;     Sent: 6/18/2019 1:26:12 PM CDT  Subject: phone call request     Pt left message requesting a call back from PATRIC      [phone]

## 2022-02-16 NOTE — TELEPHONE ENCOUNTER
---------------------  From: Sana Butterfield RN (Phone Messages Pool ([id number]))   To: [facility] Message Pool ([id number]);     Sent: 8/11/2021 2:10:48 PM CDT  Subject: congitive testing       PCP: none; NCB    Time of Call: 1:50pm       Person Calling: chandu Awan  Phone number:  [phone]    Note:  Received call from Lv, son, who stated his mother has an appt scheduled next week for DM and med check. Lv requested to have a cognitive test done at this visit. Lv stated pt's short term memory recall is poor and is often angry at family due to memory deficit. Pt becomes suspicious when asked several questions, so is worried how pt will behave during the test. Lv requested staff not say they are testing pt's cognition and memory.   Lv stated his sister will be moving out of pt's home and is worried about her safety and being alone.       Last office visit and reason: 1/19/21 DM/HTN/hyperlipidemia/OA DWG---------------------  From: Dominique Lopez LPN ([employer] Message Pool ([id number]))   To: Ligia Arias;     Sent: 8/11/2021 2:16:56 PM CDT  Subject: FW: congitive testing---------------------  From: Ligia Arias   To: [email or web address] Message Pool ([id number]);     Sent: 8/11/2021 4:36:26 PM CDT  Subject: RE: congitive testing     please let Lv know that his concerns seem very valid and I support the appt for cognitive testing but that is an extensive eval and needs to be schedule separately with one of my internal med colleagues, Dr GAUDENCIO Klein or Dr YOCASTA Faulkner.  When I see Kerry for her med check I am happy to make a recommendation  for a 'follow up' appointnment with one of my 'colleagues' to further evaluate' something that I might find a bit 'concerning' so that it doesn't seem like it is coming from the family.Spoke to pt's son and informed him of provider's message. He agrees to plan.

## 2022-02-16 NOTE — NURSING NOTE
Comprehensive Intake Entered On:  8/12/2020 10:41 AM CDT    Performed On:  8/12/2020 10:37 AM CDT by Sherin Vega CMA               Summary   Chief Complaint :   Patient presents with low right back pain radiating into the right buttock x2 weeks. No known injury   Weight Measured :   306 lb(Converted to: 306 lb 0 oz, 138.80 kg)    Height/Length Estimated :   71.75 in(Converted to: 6 ft 0 in, 182.24 cm)    Systolic Blood Pressure :   128 mmHg   Diastolic Blood Pressure :   68 mmHg   Mean Arterial Pressure :   88 mmHg   Peripheral Pulse Rate :   80 bpm   BP Site :   Right arm   BP Method :   Manual   Temperature Tympanic :   98.1 DegF(Converted to: 36.7 DegC)    Oxygen Saturation :   97 %   Sherin Vega CMA - 8/12/2020 10:37 AM CDT   Health Status   Allergies Verified? :   Yes   Medication History Verified? :   Yes   Medical History Verified? :   Yes   Pre-Visit Planning Status :   Completed   Tobacco Use? :   Former smoker   Sherin Vega CMA - 8/12/2020 10:37 AM CDT   Consents   Consent for Immunization Exchange :   Consent Granted   Consent for Immunizations to Providers :   Consent Granted   Sherin Vega CMA - 8/12/2020 10:37 AM CDT   Meds / Allergies   (As Of: 8/12/2020 10:41:47 AM CDT)   Allergies (Active)   No Known Medication Allergies  Estimated Onset Date:   Unspecified ; Created By:   Chralotte Loza CMA; Reaction Status:   Active ; Category:   Drug ; Substance:   No Known Medication Allergies ; Type:   Allergy ; Updated By:   Charlotte Loza CMA; Reviewed Date:   8/12/2020 10:38 AM CDT        Medication List   (As Of: 8/12/2020 10:41:47 AM CDT)   Prescription/Discharge Order    atorvastatin  :   atorvastatin ; Status:   Prescribed ; Ordered As Mnemonic:   atorvastatin 20 mg oral tablet ; Simple Display Line:   20 mg, 1 tab(s), PO, Daily, 90 tab(s), 0 Refill(s) ; Ordering Provider:   Ligia Arias; Catalog Code:   atorvastatin ; Order Dt/Tm:   7/9/2020 10:59:38 AM CDT          celecoxib  :   celecoxib  ; Status:   Prescribed ; Ordered As Mnemonic:   CeleBREX 200 mg oral capsule ; Simple Display Line:   200 mg, 1 cap(s), PO, Daily, 90 cap(s), 0 Refill(s) ; Ordering Provider:   Ligia Arias; Catalog Code:   celecoxib ; Order Dt/Tm:   7/9/2020 11:00:19 AM CDT          citalopram  :   citalopram ; Status:   Prescribed ; Ordered As Mnemonic:   citalopram 40 mg oral tablet ; Simple Display Line:   40 mg, 1 tab(s), PO, Daily, 90 tab(s), 0 Refill(s) ; Ordering Provider:   Ligia Arias; Catalog Code:   citalopram ; Order Dt/Tm:   7/9/2020 11:00:01 AM CDT          clobetasol topical  :   clobetasol topical ; Status:   Prescribed ; Ordered As Mnemonic:   clobetasol 0.05% topical cream ; Simple Display Line:   1 chichi, Topical, bid, 30 gm, 1 Refill(s) ; Ordering Provider:   Raphael Molina MD; Catalog Code:   clobetasol topical ; Order Dt/Tm:   12/11/2019 3:12:04 PM CST          gabapentin  :   gabapentin ; Status:   Prescribed ; Ordered As Mnemonic:   gabapentin 100 mg oral capsule ; Simple Display Line:   200 mg, 2 cap(s), PO, bid, 360 cap(s), 0 Refill(s) ; Ordering Provider:   Ligia Arias; Catalog Code:   gabapentin ; Order Dt/Tm:   7/9/2020 11:00:42 AM CDT          losartan  :   losartan ; Status:   Prescribed ; Ordered As Mnemonic:   losartan 100 mg oral tablet ; Simple Display Line:   100 mg, 1 tab(s), PO, Daily, 90 tab(s), 0 Refill(s) ; Ordering Provider:   Ligia Arias; Catalog Code:   losartan ; Order Dt/Tm:   7/9/2020 11:01:05 AM CDT          metFORMIN  :   metFORMIN ; Status:   Prescribed ; Ordered As Mnemonic:   metFORMIN 500 mg oral tablet ; Simple Display Line:   500 mg, 1 tab(s), PO, BID, 180 tab(s), 0 Refill(s) ; Ordering Provider:   Ligia Arias; Catalog Code:   metFORMIN ; Order Dt/Tm:   7/9/2020 11:01:28 AM CDT          risperiDONE  :   risperiDONE ; Status:   Prescribed ; Ordered As Mnemonic:   risperiDONE 1 mg oral tablet ; Simple Display Line:   1.5 tab(s), Oral, daily, 135  EA, 0 Refill(s) ; Ordering Provider:   Ligia Arias; Catalog Code:   risperiDONE ; Order Dt/Tm:   7/9/2020 11:01:38 AM CDT            ID Risk Screen   Recent Travel History :   No recent travel   Family Member Travel History :   No recent travel   Other Exposure to Infectious Disease :   Unknown   Sherin Vega CMA - 8/12/2020 10:37 AM CDT

## 2022-02-16 NOTE — TELEPHONE ENCOUNTER
Entered by Ting Lopez CMA on December 09, 2019 8:42:46 AM CST  ---------------------  From: Ting Lopez CMA   To: [facility]    Sent: 12/9/2019 8:42:43 AM CST  Subject: Medication Management     ** Not Approved: Patient needs appointment **  metFORMIN (METFORMIN 500MG)  TAKE ONE (1) TABLET BY MOUTH TWICE DAILY  Qty:  180 tab(s)        Days Supply:  90        Refills:  1          Substitutions Allowed     Route To [facility]   Note from Pharmacy:  Generic For:GLUCOPHAGE 500MG  Signed by Ting Lopez CMA            ** Not Approved: Patient needs appointment **  losartan (LOSARTAN POT 100MG)  TAKE ONE (1) TABLET BY MOUTH DAILY  Qty:  90 tab(s)        Days Supply:  90        Refills:  1          Substitutions Allowed     Route To [facility]   Note from Pharmacy:  Generic For:COZAAR 100MG  Signed by Ting Lopez CMA            ** Not Approved: Patient needs appointment **  citalopram (CITALOPRAM 40MG)  TAKE ONE (1) TABLET BY MOUTH DAILY  Qty:  90 tab(s)        Days Supply:  90        Refills:  1          Substitutions Allowed     Route To [facility]   Note from Pharmacy:  Generic For:CELEXA 40MG  Signed by Ting Lopez CMA            ** Not Approved: Patient needs appointment **  celecoxib (CELECOXIB 200MG)  TAKE ONE (1) CAPSULE BY MOUTH DAILY  Qty:  90 cap(s)        Days Supply:  45        Refills:  1          Substitutions Allowed     Route To [facility]   Note from Pharmacy:  Generic For:CELEBREX 200MG   N O T [facility]    Last quantity doesn't match original quantity  Signed by Ting Lopez CMA            ** Not Approved: Patient needs appointment **  atorvastatin (ATORVASTATIN 20MG)  TAKE ONE (1) TABLET BY MOUTH EACH NIGHT AT BEDTIME  Qty:  90 tab(s)        Days Supply:  90        Refills:  1          Substitutions Allowed     Route To [facility]   Note from Pharmacy:  Generic For:LIPITOR  20MG  Signed by Ting Lopez CMA            ** Patient matched by Ting Lopez CMA on 12/9/2019 8:41:25 AM CST **      ------------------------------------------  From: [facility]  To: Raphael Molina MD  Sent: December 6, 2019 1:39:56 PM CST  Subject: Medication Management  Due: December 7, 2019 1:39:56 PM CST    ** On Hold Pending Signature **  Drug: citalopram (citalopram 40 mg oral tablet)  TAKE ONE (1) TABLET BY MOUTH DAILY  Quantity: 90 tab(s)  Days Supply: 90  Refills: 1  Substitutions Allowed  Notes from Pharmacy: Generic For:CELEXA 40MG    Dispensed Drug: citalopram (citalopram 40 mg oral tablet)  TAKE ONE (1) TABLET BY MOUTH DAILY  Quantity: 90 tab(s)  Days Supply: 90  Refills: 1  Substitutions Allowed  Notes from Pharmacy: Generic For:CELEXA 40MG    ** On Hold Pending Signature **  Drug: losartan (losartan 100 mg oral tablet)  TAKE ONE (1) TABLET BY MOUTH DAILY  Quantity: 90 tab(s)  Days Supply: 90  Refills: 1  Substitutions Allowed  Notes from Pharmacy: Generic For:COZAAR 100MG    Dispensed Drug: losartan (losartan 100 mg oral tablet)  TAKE ONE (1) TABLET BY MOUTH DAILY  Quantity: 90 tab(s)  Days Supply: 90  Refills: 1  Substitutions Allowed  Notes from Pharmacy: Generic For:COZAAR 100MG    ** On Hold Pending Signature **  Drug: celecoxib (celecoxib 200 mg oral capsule)  TAKE ONE (1) CAPSULE BY MOUTH DAILY  Quantity: 90 cap(s)  Days Supply: 45  Refills: 1  Substitutions Allowed  Notes from Pharmacy: Generic For:CELEBREX 200MG   N O T I C E    Last quantity doesn't match original quantity    Dispensed Drug: celecoxib (celecoxib 200 mg oral capsule)  TAKE ONE (1) CAPSULE BY MOUTH DAILY  Quantity: 90 cap(s)  Days Supply: 45  Refills: 1  Substitutions Allowed  Notes from Pharmacy: Generic For:CELEBREX 200MG   N O T I [facility]    Last quantity doesn't match original quantity    ** On Hold Pending Signature **  Drug: atorvastatin (atorvastatin 20 mg oral tablet)  TAKE ONE (1) TABLET BY MOUTH EACH  NIGHT AT BEDTIME  Quantity: 90 tab(s)  Days Supply: 90  Refills: 1  Substitutions Allowed  Notes from Pharmacy: Generic For:LIPITOR 20MG    Dispensed Drug: atorvastatin (atorvastatin 20 mg oral tablet)  TAKE ONE (1) TABLET BY MOUTH EACH NIGHT AT BEDTIME  Quantity: 90 tab(s)  Days Supply: 90  Refills: 1  Substitutions Allowed  Notes from Pharmacy: Generic For:LIPITOR 20MG    ** On Hold Pending Signature **  Drug: metFORMIN (metFORMIN 500 mg oral tablet)  TAKE ONE (1) TABLET BY MOUTH TWICE DAILY  Quantity: 180 tab(s)  Days Supply: 90  Refills: 1  Substitutions Allowed  Notes from Pharmacy: Generic For:GLUCOPHAGE 500MG    Dispensed Drug: metFORMIN (metFORMIN 500 mg oral tablet)  TAKE ONE (1) TABLET BY MOUTH TWICE DAILY  Quantity: 180 tab(s)  Days Supply: 90  Refills: 1  Substitutions Allowed  Notes from Pharmacy: Generic For:GLUCOPHAGE 500MG  ------------------------------------------

## 2022-02-16 NOTE — TELEPHONE ENCOUNTER
Entered by Ting Lopez CMA on May 14, 2019 7:19:27 AM CDT  ---------------------  From: Ting Lopez CMA   To: [facility]    Sent: 5/14/2019 7:19:27 AM CDT  Subject: Medication Management     ** Submitted: **  Order:risperiDONE (risperiDONE 1 mg oral tablet)  1.5 tab(s)  Oral  daily  Qty:  45 EA        Days Supply:  0        Refills:  0          Substitutions Allowed     Route To [facility]    Signed by Ting Lopez CMA  5/14/2019 7:19:00 AM    ** Submitted: **  Complete:risperiDONE (risperiDONE 1 mg oral tablet)   Signed by Ting Lopez CMA  5/14/2019 7:19:00 AM    ** Not Approved:  **  risperiDONE (RisperiDONE Tablet 1 MG)  Take 1&1/2 tablet by mouth daily  Qty:  45 EA        Days Supply:  0        Refills:  0          Substitutions Allowed     Route To [facility]   Signed by Ting Lopez CMA            ** Patient matched by Ting Lopez CMA on 5/14/2019 7:18:22 AM CDT **      ------------------------------------------  From: [facility]  To: Raphael Molina MD  Sent: May 13, 2019 4:39:30 PM CDT  Subject: Medication Management  Due: May 14, 2019 4:39:30 PM CDT    ** On Hold Pending Signature **  Drug: risperiDONE (risperiDONE 1 mg oral tablet)  Take 1&1/2 tablet by mouth daily  Quantity: 45 EA       Days Supply: 0         Refills: 0  Substitutions Allowed  Notes from Pharmacy:     Dispensed Drug: risperiDONE (risperiDONE 1 mg oral tablet)  Take 1&1/2 tablet by mouth daily  Quantity: 45 EA       Days Supply: 0         Refills: 0  Substitutions Allowed  Notes from Pharmacy:   ------------------------------------------

## 2022-02-16 NOTE — TELEPHONE ENCOUNTER
---------------------  From: Vangie Jimenez LPN (Phone Messages Pool ([id number]_[facility]))   To: Advanced Practice Provider Pool ([facility]);     Sent: 10/9/2020 10:35:28 AM CDT  Subject: refills     Phone Message    PCP:   PATRIC      Time of Call:  10:09am       Person Calling:  Lv- Lucky Pai  Phone number:  [phone]    Returned call at: _    Note:   Lv LM stating pt has no refill son her medications. Lv says pt's son is going to  pt's medications for her because she has been out for a bit. Lv is asking for at least 30 days to be sent in.    Per [facility] note 7/9/20 refills were sent in (3 month supply)- pt was to have a lab appt in 10 weeks and then video appt 2 days later  Per RTC PATRIC placed 7/21 for pt to RTC Jan 2021 for med check and fasting labs    Please advise if refills appropriate until January. Risperidone requires provider approval    Last office visit and reason:  8/12/20 hip pain with [facility]---------------------  From: Shadi HICKEY, Raphael RICHMOND (Advanced Practice Provider Pool (32[facility]))   To: Phone Messages Pool (32224_[place] [facility] [place]);     Sent: 10/9/2020 10:54:58 AM CDT  Subject: RE: refills     okay for refills until Jan 2021Medications refilled and notified pharmacy that medications refilled and pt is due for visit in Jan, she verbalized a good understanding.

## 2022-02-16 NOTE — NURSING NOTE
Comprehensive Intake Entered On:  8/16/2021 2:36 PM CDT    Performed On:  8/16/2021 2:27 PM CDT by Dominique Lopez LPN               Summary   Chief Complaint :   1) establish care with new primary 2) chronic disease visit 3) son called with some concerns about some cognitive issues 4) left shoulder pain 5) pain mid/lower back on right side   Weight Measured :   306.2 lb(Converted to: 306 lb 3 oz, 138.890 kg)    Height Measured :   71.75 in(Converted to: 6 ft 0 in, 182.24 cm)    Body Mass Index :   41.81 kg/m2 (HI)    Body Surface Area :   2.65 m2   Height/Length Estimated :   71.75 in(Converted to: 6 ft 0 in, 182.24 cm)    Systolic Blood Pressure :   156 mmHg (HI)    Diastolic Blood Pressure :   80 mmHg   Mean Arterial Pressure :   105 mmHg   Peripheral Pulse Rate :   73 bpm   BP Site :   Right arm   BP Method :   Manual   Temperature Tympanic :   98.3 DegF(Converted to: 36.8 DegC)    Oxygen Saturation :   94 %   Dominique Lopez LPN - 8/16/2021 2:27 PM CDT   Health Status   Allergies Verified? :   Yes   Medication History Verified? :   Yes   Medical History Verified? :   No   Pre-Visit Planning Status :   Completed   Tobacco Use? :   Former smoker   Dominique Lopez LPN - 8/16/2021 2:27 PM CDT   Meds / Allergies   (As Of: 8/16/2021 2:36:01 PM CDT)   Allergies (Active)   No Known Medication Allergies  Estimated Onset Date:   Unspecified ; Created By:   Charlotte Loza CMA; Reaction Status:   Active ; Category:   Drug ; Substance:   No Known Medication Allergies ; Type:   Allergy ; Updated By:   Charlotte Loza CMA; Reviewed Date:   1/19/2021 10:56 AM CST        Medication List   (As Of: 8/16/2021 2:36:01 PM CDT)   Prescription/Discharge Order    atorvastatin  :   atorvastatin ; Status:   Prescribed ; Ordered As Mnemonic:   atorvastatin 20 mg oral tablet ; Simple Display Line:   See Instructions, TAKE ONE (1) TABLET DAILY, 90 tab(s), 0 Refill(s) ; Ordering Provider:   Ligia Arias; Catalog Code:    atorvastatin ; Order Dt/Tm:   8/11/2021 2:05:20 PM CDT          metFORMIN  :   metFORMIN ; Status:   Prescribed ; Ordered As Mnemonic:   metFORMIN 500 mg oral tablet ; Simple Display Line:   See Instructions, ONE (1) TAB(S) ORAL TWICE DAILY, 180 tab(s), 0 Refill(s) ; Ordering Provider:   Ligia Arias; Catalog Code:   metFORMIN ; Order Dt/Tm:   8/11/2021 5:03:50 PM CDT          losartan  :   losartan ; Status:   Prescribed ; Ordered As Mnemonic:   losartan 100 mg oral tablet ; Simple Display Line:   See Instructions, ONE (1) TAB(S) ORAL DAILY, 90 tab(s), 0 Refill(s) ; Ordering Provider:   Ligia Arias; Catalog Code:   losartan ; Order Dt/Tm:   8/11/2021 2:06:39 PM CDT          citalopram  :   citalopram ; Status:   Prescribed ; Ordered As Mnemonic:   citalopram 40 mg oral tablet ; Simple Display Line:   See Instructions, ONE (1) TAB(S) ORAL DAILY, 90 tab(s), 0 Refill(s) ; Ordering Provider:   Ligia Arias; Catalog Code:   citalopram ; Order Dt/Tm:   8/11/2021 2:06:19 PM CDT          gabapentin  :   gabapentin ; Status:   Prescribed ; Ordered As Mnemonic:   gabapentin 100 mg oral capsule ; Simple Display Line:   200 mg, 2 cap(s), PO, bid, 360 cap(s), 1 Refill(s) ; Ordering Provider:   Raphael Hsu PA-C; Catalog Code:   gabapentin ; Order Dt/Tm:   1/19/2021 11:19:24 AM CST          risperiDONE  :   risperiDONE ; Status:   Prescribed ; Ordered As Mnemonic:   risperiDONE 1 mg oral tablet ; Simple Display Line:   1.5 tab(s), Oral, daily, 135 tab(s), 1 Refill(s) ; Ordering Provider:   Raphael Hsu PA-C; Catalog Code:   risperiDONE ; Order Dt/Tm:   1/19/2021 11:18:42 AM CST          celecoxib  :   celecoxib ; Status:   Prescribed ; Ordered As Mnemonic:   CeleBREX 200 mg oral capsule ; Simple Display Line:   200 mg, 1 cap(s), PO, Daily, 90 cap(s), 3 Refill(s) ; Ordering Provider:   Raphael Hsu PA-C; Catalog Code:   celecoxib ; Order Dt/Tm:   1/19/2021 11:17:32 AM CST

## 2022-02-16 NOTE — PROGRESS NOTES
Patient:   JAN SHIPLEY            MRN: [id number]            FIN: [id number]               Age:   66 years     Sex:  Female     :  1950   Associated Diagnoses:   Hypertension; Hyperlipidemia NOS; Diabetes Mellitus; Depression, major, recurrent, in complete remission   Author:   Raphael Molina MD      Impression and Plan   Diagnosis     Hypertension (HXC01-SZ I10).     Course:  Well controlled.    Orders     Orders   Charges (Evaluation and Management):  12727 office outpatient visit 25 minutes (Charge) (Order): Quantity: 1, Hypertension  Hyperlipidemia NOS  Diabetes Mellitus.     Orders (Selected)   Prescriptions  Prescribed  hydroCHLOROthiazide-losartan 12.5 mg-100 mg oral tablet: 1 tab(s), po, daily, # 90 tab(s), 1 Refill(s), Type: Maintenance, Pharmacy: [facility], 1 tab(s) po daily.     Diagnosis     Hyperlipidemia NOS (TOS39-SD E78.0).     Course:  Progressing as expected.    Orders     Orders (Selected)   Prescriptions  Prescribed  atorvastatin 20 mg oral tablet: 1 tab(s) ( 20 mg ), PO, Daily, # 90 tab(s), 1 Refill(s), Type: Maintenance, Pharmacy: [facility], 1 tab(s) po daily.     Diagnosis     Diabetes Mellitus (QWQ26-PZ E11.9).     Course:  Well controlled.    Orders     Orders (Selected)   Prescriptions  Prescribed  metFORMIN 500 mg oral tablet: 1 tab(s) ( 500 mg ), PO, BID, # 180 tab(s), 1 Refill(s), Type: Maintenance, Pharmacy: [facility], 1 tab(s) po bid.     Diagnosis     Depression, major, recurrent, in complete remission (BUC16-RN F33.42).     Course:  Progressing as expected.    Orders     Orders (Selected)   Prescriptions  Prescribed  citalopram 40 mg oral tablet: 1 tab(s) ( 40 mg ), PO, Daily, # 90 tab(s), 1 Refill(s), Type: Maintenance, Pharmacy: [facility], 1 tab(s) po daily  risperiDONE 1 mg oral tablet: 1.5 tab(s) ( 1.5 mg ), po, daily, # 135 tab(s), 1 Refill(s), Type: Maintenance, Pharmacy: [facility], 1.5 tab(s) po daily.        Visit  Information      Date of Service: 2017 09:56 am  Performing Location: [facility]  Encounter#: [id number]      Primary Care Provider (PCP):  Raphael Molina MD# [id number]      Referring Provider:  No referring provider recorded for selected visit.   Visit type:  New symptom.    Accompanied by:  No one.    Source of history:  Self.    History limitation:  None.       Chief Complaint   Chief complaint discussed and confirmed correct.     2017 10:01 AM CDT   Pt here for med ck        History of Present Illness             The patient presents for follow-up evaluation of diabetes.  The quality of the patient's diabetes is described as being unchanged from previous visit.  Exacerbating factors consist of noncompliance with diet and sedentary lifestyle.  Relieving factors consist of medication.  Associated symptoms consist of none.  Medical encounters: none.  Compliance problems: with diet and with weight management.  Glucose results: within target range.  Hemoglobin A1c results: < 6%.  Lifestyle modification: weight reduction, diet, increased physical activity.  Medications: See medication list.  Additional pertinent history: last podiatric foot exam: 2016 and eye exam recommended.  Target A1c: 7.0    Target BS: 125      Fastin      Post prandial:150    Meter BS averages: NA    Hypoglycemia      Frequency:never      Severity:      Awareness:      Treatment:    Microvascular      Eye:neg      Kidney:neg      Neuro:peripheral neuropathy      Autonomic:neg       Macrovascular      CAD:neg      PVD:neg      HLD:pos      HTN:pos        Health Care Maintenance      Aspirin:yes      Pneumovax:yes       Flu vaccine:refused      Foot exam:yes      Tobacco:no    Diet History:SAD    Laboratory      Last A1c:6.5      Last LDL:89      Last creatinine0.73      Last MARIS: 6.               The patient presents for follow-up evaluation of hypertension.  The quality of hypertension symptom(s)  since the patient's last visit is described as being unchanged.  The severity of the hypertension symptom(s) since the last visit is moderate.  Since the patient's last visit, the timing/course of hypertension symptom(s) is constant.  Exacerbating factors consist of none.  Relieving factors consist of medication.  Associated symptoms consist of none.  Prior treatment consists of lifestyle modification (weight reduction, dietary sodium restriction, increased physical activity).  Medical encounters: none.  Compliance problems: none.        Interval History   Cholesterol Management   Total cholesterol results < 200 mg/dL (optimal).  LDL cholesterol results < 100 mg/dL (optimal).  HDL cholesterol results 40-60 mg/dl.  Triglyceride results < 150 mg/dL (normal).  The course is progressing as expected.  Compliance problems: none.  The effect on daily activities is no change in activity level and no change in eating habits.  Associated symptoms characterized by no fatigue, chest pain, joint pain, muscle weakness or myalgias.        Review of Systems   Constitutional:  Negative.    Eye:  Negative.    Ear/Nose/Mouth/Throat:  Negative.    Respiratory:  Negative.    Cardiovascular:  Negative.    Gastrointestinal:  Negative.    Genitourinary:  Negative.    Hematology/Lymphatics:  Negative.    Endocrine:  Negative.    Immunologic:  Negative.    Musculoskeletal:  Negative.    Integumentary:  Negative.    Neurologic:  Negative.    Psychiatric:  Negative.    All other systems reviewed and negative      Health Status   Allergies:    Allergic Reactions (Selected)  No Known Medication Allergies   Medications:  (Selected)   Prescriptions  Prescribed  aspirin 325 mg oral tablet: 1 tab(s) ( 325 mg ), po, daily, # 30 tab(s), 0 Refill(s), Type: Maintenance, Pharmacy: [facility], 1 tab(s) po daily  atorvastatin 20 mg oral tablet: 1 tab(s) ( 20 mg ), PO, Daily, # 90 tab(s), 1 Refill(s), Type: Maintenance, Pharmacy: [facility],  1 tab(s) po daily  citalopram 40 mg oral tablet: 1 tab(s) ( 40 mg ), PO, Daily, # 90 tab(s), 1 Refill(s), Type: Maintenance, Pharmacy: [facility], 1 tab(s) po daily  gabapentin 100 mg oral capsule: 1 cap(s) ( 100 mg ), PO, daily, # 90 cap(s), 1 Refill(s), Type: Maintenance, Pharmacy: [facility], 1 cap(s) po daily  hydroCHLOROthiazide-losartan 12.5 mg-100 mg oral tablet: 1 tab(s), po, daily, # 90 tab(s), 1 Refill(s), Type: Maintenance, Pharmacy: [facility], 1 tab(s) po daily  metFORMIN 500 mg oral tablet: 1 tab(s) ( 500 mg ), PO, BID, # 180 tab(s), 1 Refill(s), Type: Maintenance, Pharmacy: [facility], 1 tab(s) po bid  risperiDONE 1 mg oral tablet: 1.5 tab(s) ( 1.5 mg ), po, daily, # 135 tab(s), 1 Refill(s), Type: Maintenance, Pharmacy: [facility], 1.5 tab(s) po daily   Problem list:    All Problems  Colon cancer / SNOMED CT 562586948 / Confirmed  Depression, major, recurrent, in complete remission / ICD-9-.36 / Confirmed  Diabetes Mellitus / ICD-9- / Confirmed  Diabetic Neuropathy / ICD-9-.60 / Confirmed  Hyperlipidemia NOS / SNOMED CT 592572699 / Confirmed  Hypertension / ICD-9-.91 / Confirmed  Morbid Obesity / ICD-9-.01 / Probable      Histories   Past Medical History:    Active  Morbid Obesity (278.01)  Hyperlipidemia NOS (137896070)  Hypertension (997.91)  Diabetes Mellitus (250)  Depression, major, recurrent, in complete remission (296.36)  Diabetic Neuropathy (250.60)  Colon cancer (364708541)   Family History:    Diabetes mellitus  Father  Brother  Grandmother (P)  Hypertension  Grandmother (M)  Heart disease  Grandfather (P)  Father  Grandmother (M)  Substance abuse  Father     Procedure history:    Colonoscopy (SNOMED CT 190306047) performed by Leela Crooks MD on 5/13/2016 at 65 Years.  Comments:  5/26/2016 8:02 AM - Moon Alicia  Indication:  Follow up previous lesion.  Sedation:  MAC  Impression:  Diverticulosis of lg int w/o  perforation or abscess with bl.  Personal history of malignant neoplasm of large in.  Benign neoplasm of ascending colon.  Colonoscopy (SNOMED CT 427344553) performed by Leela Crooks MD on 10/23/2015 at 65 Years.  Comments:  2015 8:43 AM - Moon Alicia  Indication:  History of colon cancer.  Sedation: MAC  sigmoid colon resection in  at 64 Years.  gall bladder removed.  tubes tied.  appendectomy.  benign lump from back of neck removed.   x 3.   Social History:        Alcohol Assessment: Denies Alcohol Use            Never      Tobacco Assessment: Past            Past      Substance Abuse Assessment            Never      Employment and Education Assessment            Retired      Home and Environment Assessment            Marital status: .  Lives with Self.  3 children.  Living situation: Home/Independent.        Physical Examination   Vital signs reviewed  and within acceptable limits    Vital Signs   2017 10:29 AM CDT Systolic Blood Pressure 138 mmHg   2017 10:01 AM CDT Peripheral Pulse Rate 80 bpm    Pulse Site Radial artery    HR Method Manual    Systolic Blood Pressure 144 mmHg  HI    Diastolic Blood Pressure 82 mmHg    Mean Arterial Pressure 103 mmHg    BP Site Right arm    BP Method Manual      Measurements from flowsheet : Measurements   2017 10:01 AM CDT Height Measured - Standard 71.75 in    Weight Measured - Standard 319.6 lb    BSA 2.71 m2    Body Mass Index 43.64 kg/m2      PHQ 9 score: 3   General:  Alert and oriented, No acute distress.    HENT:  Normocephalic.    Neck:  Supple, No lymphadenopathy, No thyromegaly.    Respiratory:  Lungs are clear to auscultation, Respirations are non-labored, Breath sounds are equal, Symmetrical chest wall expansion.    Cardiovascular:  Normal rate, Regular rhythm, No murmur, No gallop, Good pulses equal in all extremities, Normal peripheral perfusion, No edema.    Gastrointestinal:  Soft, Non-tender, Non-distended, Normal bowel  sounds, No organomegaly.    Musculoskeletal:  Normal range of motion, No swelling, No deformity, Normal gait.    Integumentary:  Warm, Dry, Pink, No foot ulcers or skin lesions..    Feet:  Normal by visual exam, Sensation intact, By monofilament exam.    Neurologic:  Alert, Oriented, Normal sensory, Normal motor function, No focal deficits.    Psychiatric:  Cooperative, Appropriate mood & affect.

## 2022-02-16 NOTE — NURSING NOTE
Comprehensive Intake Entered On:  7/11/2019 10:38 AM CDT    Performed On:  7/11/2019 10:35 AM CDT by Ting Lopez CMA               Summary   Chief Complaint :   Pt here for fungal infection   Weight Measured :   326 lb(Converted to: 326 lb 0 oz, 147.87 kg)    Height Measured :   71.75 in(Converted to: 6 ft 0 in, 182.24 cm)    Body Mass Index :   44.52 kg/m2 (HI)    Body Surface Area :   2.73 m2   Systolic Blood Pressure :   130 mmHg   Diastolic Blood Pressure :   78 mmHg   Mean Arterial Pressure :   95 mmHg   Peripheral Pulse Rate :   76 bpm   Oxygen Saturation :   97 %   Ting Lopez CMA - 7/11/2019 10:35 AM CDT   Health Status   Allergies Verified? :   Yes   Medication History Verified? :   Yes   Medical History Verified? :   Yes   Pre-Visit Planning Status :   Completed   Tobacco Use? :   Former smoker   Ting Lopez CMA - 7/11/2019 10:35 AM CDT   Consents   Consent for Immunization Exchange :   Consent Granted   Consent for Immunizations to Providers :   Consent Granted   Ting Lopez CMA - 7/11/2019 10:35 AM CDT   Meds / Allergies   (As Of: 7/11/2019 10:38:17 AM CDT)   Allergies (Active)   No Known Medication Allergies  Estimated Onset Date:   Unspecified ; Created By:   Charlotte Loza CMA; Reaction Status:   Active ; Category:   Drug ; Substance:   No Known Medication Allergies ; Type:   Allergy ; Updated By:   Charlotte Loza CMA; Reviewed Date:   7/11/2019 10:36 AM CDT        Medication List   (As Of: 7/11/2019 10:38:17 AM CDT)   Prescription/Discharge Order    aspirin  :   aspirin ; Status:   Prescribed ; Ordered As Mnemonic:   aspirin 81 mg oral delayed release tablet ; Simple Display Line:   81 mg, 1 tab(s), PO, Daily, 90 tab(s), 1 Refill(s) ; Ordering Provider:   Raphael Molina MD; Catalog Code:   aspirin ; Order Dt/Tm:   10/29/2018 10:52:02 AM          atorvastatin  :   atorvastatin ; Status:   Prescribed ; Ordered As Mnemonic:   atorvastatin 20 mg oral tablet ; Simple Display Line:   20 mg,  1 tab(s), PO, Daily, 90 tab(s), 1 Refill(s) ; Ordering Provider:   Raphael Molina MD; Catalog Code:   atorvastatin ; Order Dt/Tm:   5/29/2019 9:53:21 AM          celecoxib  :   celecoxib ; Status:   Prescribed ; Ordered As Mnemonic:   CeleBREX 200 mg oral capsule ; Simple Display Line:   200 mg, 1 cap(s), PO, Daily, 90 cap(s), 1 Refill(s) ; Ordering Provider:   Raphael Molina MD; Catalog Code:   celecoxib ; Order Dt/Tm:   5/29/2019 9:53:09 AM          citalopram  :   citalopram ; Status:   Prescribed ; Ordered As Mnemonic:   citalopram 40 mg oral tablet ; Simple Display Line:   40 mg, 1 tab(s), PO, Daily, 90 tab(s), 1 Refill(s) ; Ordering Provider:   Raphael Molina MD; Catalog Code:   citalopram ; Order Dt/Tm:   5/29/2019 9:53:27 AM          gabapentin  :   gabapentin ; Status:   Prescribed ; Ordered As Mnemonic:   gabapentin 100 mg oral capsule ; Simple Display Line:   200 mg, 2 cap(s), PO, bid, 360 cap(s), 1 Refill(s) ; Ordering Provider:   Raphael Molina MD; Catalog Code:   gabapentin ; Order Dt/Tm:   5/29/2019 9:52:50 AM          losartan  :   losartan ; Status:   Prescribed ; Ordered As Mnemonic:   losartan 100 mg oral tablet ; Simple Display Line:   100 mg, 1 tab(s), PO, Daily, 90 tab(s), 1 Refill(s) ; Ordering Provider:   Raphael Molina MD; Catalog Code:   losartan ; Order Dt/Tm:   5/29/2019 9:51:09 AM          metFORMIN  :   metFORMIN ; Status:   Prescribed ; Ordered As Mnemonic:   metFORMIN 500 mg oral tablet ; Simple Display Line:   500 mg, 1 tab(s), PO, BID, 180 tab(s), 1 Refill(s) ; Ordering Provider:   Raphael Molina MD; Catalog Code:   metFORMIN ; Order Dt/Tm:   5/29/2019 9:53:34 AM          risperiDONE  :   risperiDONE ; Status:   Prescribed ; Ordered As Mnemonic:   risperiDONE 1 mg oral tablet ; Simple Display Line:   1.5 tab(s), Oral, daily, 135 EA, 1 Refill(s) ; Ordering Provider:   Raphael Molina MD; Catalog Code:   risperiDONE ; Order Dt/Tm:   5/29/2019 9:53:41 AM          triamcinolone topical   :   triamcinolone topical ; Status:   Prescribed ; Ordered As Mnemonic:   triamcinolone 0.5% topical cream ; Simple Display Line:   1 chichi, Topical, bid, 20 gm, 1 Refill(s) ; Ordering Provider:   Raphael Molina MD; Catalog Code:   triamcinolone topical ; Order Dt/Tm:   7/3/2019 3:26:01 PM

## 2022-02-16 NOTE — TELEPHONE ENCOUNTER
"---------------------  From: Dominique Lopez LPN   To: Raphael Molina MD;     Sent: 7/3/2019 3:00:34 PM CDT  Subject: OTC - antifungal       Phone Message    PCP: PATRIC    Time of call: 2:52pm     Person calling: Kerry  Contact # : [phone]    MESSAGE: Pt states that PATRIC had recommend that she use Terbinafine cream for \"something on legs\". She says that she has been using for about 2 weeks and has not noticed any improvement. She is wondering if she should continue to use or if there is something else she can try.    Last visit/reason: 19 - DM, HTN, HDL---------------------  From: Raphael Molina MD   To: Dominique Lopez LPN;     Sent: 7/3/2019 3:27:16 PM CDT  Subject: RE: OTC - antifungal     I sent an Rx to  for Triamcinolone Cream.  If that does not work I would recommend she return to the clinic to biopsy the skin lesion.3:31pm Called and spoke with pt. Informed her an rx has been sent to the pharmacy. Pt should f/u in clinic if no improvement. Pt agrees with this plan.  "

## 2022-02-16 NOTE — PROGRESS NOTES
Patient:   JAN SHIPLEY            MRN: [id number]            FIN: [id number]               Age:   67 years     Sex:  Female     :  1950   Associated Diagnoses:   Hypertension; Hyperlipidemia NOS; Diabetes Mellitus; Depression, major, recurrent, in complete remission   Author:   Jesse DECKER, Raphael      Impression and Plan   Diagnosis     Hypertension (WOV73-OY I10).     Course:  Well controlled.    Orders     Orders   Charges (Evaluation and Management):  37248 office outpatient visit 25 minutes (Charge) (Order): Quantity: 1, Hyperlipidemia NOS  Diabetes Mellitus  Hypertension.     Orders (Selected)   Prescriptions  Prescribed  hydrochlorothiazide-losartan 12.5 mg-100 mg oral tablet: 1 tab(s), po, daily, # 90 tab(s), 1 Refill(s), Type: Maintenance, Pharmacy: [facility], 1 tab(s) po daily.     Diagnosis     Hyperlipidemia NOS (WII26-RF E78.0).     Course:  Progressing as expected.    Orders     Orders (Selected)   Prescriptions  Prescribed  atorvastatin 20 mg oral tablet: 1 tab(s) ( 20 mg ), PO, Daily, # 90 tab(s), 1 Refill(s), Type: Maintenance, Pharmacy: [facility], 1 tab(s) po daily.     Diagnosis     Diabetes Mellitus (YOW64-TV E11.9).     Course:  Well controlled.    Orders     Orders (Selected)   Outpatient Orders  Ordered (Dispatched)  CBC (h/h, RBC, indices, WBC, Plt)* ([facility]): Specimen Type: Blood, Collection Date: 18 10:09:00 CDT  Comprehensive Metabolic Panel* ([facility]): Specimen Type: Serum, Collection Date: 18 10:09:00 CDT  Hemoglobin A1c* ([facility]): Specimen Type: Blood, Collection Date: 18 10:09:00 CDT  Lipid panel with reflex to direct ldl* ([facility]): Specimen Type: Serum, Collection Date: 18 10:09:00 CDT  Prescriptions  Prescribed  aspirin 325 mg oral tablet: 1 tab(s) ( 325 mg ), po, daily, # 30 tab(s), 0 Refill(s), Type: Maintenance, Pharmacy: [facility], 1 tab(s) po daily  metFORMIN 500 mg oral tablet: 1 tab(s) ( 500 mg ), PO, BID, # 180 tab(s), 1  Refill(s), Type: Maintenance, Pharmacy: [facility], 1 tab(s) po bid.     Diagnosis     Depression, major, recurrent, in complete remission (TYP03-NK F33.42).     Course:  Progressing as expected.    Orders     Orders (Selected)   Prescriptions  Prescribed  citalopram 40 mg oral tablet: 1 tab(s) ( 40 mg ), PO, Daily, # 90 tab(s), 1 Refill(s), Type: Maintenance, Pharmacy: [facility], 1 tab(s) po daily  risperiDONE 1 mg oral tablet: 1.5 tab(s) ( 1.5 mg ), po, daily, # 135 tab(s), 1 Refill(s), Type: Maintenance, Pharmacy: [facility], 1.5 tab(s) po daily.        Visit Information      Date of Service: 2018 09:38 am  Performing Location: [facility]  Encounter#: [id number]      Primary Care Provider (PCP):  Raphael Molina MD# [id number]      Referring Provider:  Raphael Molina MD# [id number]   Visit type:  New symptom.    Accompanied by:  No one.    Source of history:  Self.    History limitation:  None.       Chief Complaint   Chief complaint discussed and confirmed correct.     2018 9:44 AM CDT    Pt here for med ck        History of Present Illness             The patient presents for follow-up evaluation of diabetes.  The quality of the patient's diabetes is described as being unchanged from previous visit.  Exacerbating factors consist of noncompliance with diet and sedentary lifestyle.  Relieving factors consist of medication.  Associated symptoms consist of none.  Medical encounters: none.  Compliance problems: with diet and with weight management.  Glucose results: within target range.  Hemoglobin A1c results: < 6%.  Lifestyle modification: weight reduction, diet, increased physical activity.  Medications: See medication list.  Additional pertinent history: last podiatric foot exam: 2018 and eye exam recommended.  Target A1c: 7.0    Target BS: 125      Fastin      Post prandial:150    Meter BS averages: NA    Hypoglycemia       Frequency:never      Severity:      Awareness:      Treatment:    Microvascular      Eye:neg      Kidney:neg      Neuro:peripheral neuropathy      Autonomic:neg       Macrovascular      CAD:neg      PVD:neg      HLD:pos      HTN:pos        Health Care Maintenance      Aspirin:yes      Pneumovax:yes 2010      Flu vaccine:refused      Foot exam:yes      Tobacco:no    Diet History:SAD    Laboratory      Last A1c:6.5      Last LDL:89      Last creatinine0.73      Last MARIS: 6.               The patient presents for follow-up evaluation of hypertension.  The quality of hypertension symptom(s) since the patient's last visit is described as being unchanged.  The severity of the hypertension symptom(s) since the last visit is moderate.  Since the patient's last visit, the timing/course of hypertension symptom(s) is constant.  Exacerbating factors consist of none.  Relieving factors consist of medication.  Associated symptoms consist of none.  Prior treatment consists of lifestyle modification (weight reduction, dietary sodium restriction, increased physical activity).  Medical encounters: none.  Compliance problems: none.        Interval History   Cholesterol Management   Total cholesterol results < 200 mg/dL (optimal).  LDL cholesterol results < 100 mg/dL (optimal).  HDL cholesterol results 40-60 mg/dl.  Triglyceride results < 150 mg/dL (normal).  The course is progressing as expected.  Compliance problems: none.  The effect on daily activities is no change in activity level and no change in eating habits.  Associated symptoms characterized by no fatigue, chest pain, joint pain, muscle weakness or myalgias.        Review of Systems   Constitutional:  Negative.    Eye:  Negative.    Ear/Nose/Mouth/Throat:  Negative.    Respiratory:  Negative.    Cardiovascular:  Negative.    Gastrointestinal:  Negative.    Genitourinary:  Negative.    Hematology/Lymphatics:  Negative.    Endocrine:  Negative.    Immunologic:  Negative.     Musculoskeletal:  Negative.    Integumentary:  Negative.    Neurologic:  Headache.    Psychiatric:  Negative.    All other systems reviewed and negative      Health Status   Allergies:    Allergic Reactions (Selected)  No Known Medication Allergies   Medications:  (Selected)   Prescriptions  Prescribed  aspirin 325 mg oral tablet: 1 tab(s) ( 325 mg ), po, daily, # 30 tab(s), 0 Refill(s), Type: Maintenance, Pharmacy: [facility], 1 tab(s) po daily  atorvastatin 20 mg oral tablet: 1 tab(s) ( 20 mg ), PO, Daily, # 90 tab(s), 1 Refill(s), Type: Maintenance, Pharmacy: [facility], 1 tab(s) po daily  citalopram 40 mg oral tablet: 1 tab(s) ( 40 mg ), PO, Daily, # 90 tab(s), 1 Refill(s), Type: Maintenance, Pharmacy: [facility], 1 tab(s) po daily  gabapentin 100 mg oral capsule: 1 cap(s) ( 100 mg ), PO, bid, # 180 cap(s), 1 Refill(s), Type: Maintenance, Pharmacy: [facility], 1 cap(s) po bid  hydrochlorothiazide-losartan 12.5 mg-100 mg oral tablet: 1 tab(s), po, daily, # 90 tab(s), 1 Refill(s), Type: Maintenance, Pharmacy: [facility], 1 tab(s) po daily  metFORMIN 500 mg oral tablet: 1 tab(s) ( 500 mg ), PO, BID, # 180 tab(s), 1 Refill(s), Type: Maintenance, Pharmacy: [facility], 1 tab(s) po bid  risperiDONE 1 mg oral tablet: 1.5 tab(s) ( 1.5 mg ), po, daily, # 135 tab(s), 1 Refill(s), Type: Maintenance, Pharmacy: [facility], 1.5 tab(s) po daily   Problem list:    All Problems  Colon cancer / SNOMED CT 021903758 / Confirmed  Depression, major, recurrent, in complete remission / ICD-9-.36 / Confirmed  Diabetes Mellitus / ICD-9- / Confirmed  Diabetic Neuropathy / ICD-9-.60 / Confirmed  Hyperlipidemia NOS / SNOMED CT 645845384 / Confirmed  Hypertension / ICD-9-.91 / Confirmed  Morbid Obesity / ICD-9-.01 / Probable      Histories   Past Medical History:    Active  Morbid Obesity (278.01)  Hyperlipidemia NOS (506402258)  Hypertension  (997.91)  Diabetes Mellitus (250)  Depression, major, recurrent, in complete remission (296.36)  Diabetic Neuropathy (250.60)  Colon cancer (241069919)   Family History:    Diabetes mellitus  Father  Brother  Grandmother (P)  Hypertension  Grandmother (M)  Heart disease  Grandfather (P)  Father  Grandmother (M)  Substance abuse  Father     Procedure history:    Colonoscopy (SNOMED CT 251985470) performed by Leela Crooks MD on 2016 at 65 Years.  Comments:  2016 8:02 AM - Moon Alicia  Indication:  Follow up previous lesion.  Sedation:  MAC  Impression:  Diverticulosis of lg int w/o perforation or abscess with bl.  Personal history of malignant neoplasm of large in.  Benign neoplasm of ascending colon.  Colonoscopy (SNOMED CT 079939786) performed by Leela Crooks MD on 10/23/2015 at 65 Years.  Comments:  2015 8:43 AM - Moon Alciia  Indication:  History of colon cancer.  Sedation: MAC  sigmoid colon resection in  at 64 Years.  gall bladder removed.  tubes tied.  appendectomy.  benign lump from back of neck removed.   x 3.   Social History:        Alcohol Assessment: Denies Alcohol Use            Never      Tobacco Assessment: Past            Past      Substance Abuse Assessment            Never      Employment and Education Assessment            Retired      Home and Environment Assessment            Marital status: .  Lives with Self.  3 children.  Living situation: Home/Independent.        Physical Examination   Vital signs reviewed  and within acceptable limits    Vital Signs   2018 9:44 AM CDT Peripheral Pulse Rate 76 bpm    Systolic Blood Pressure 128 mmHg    Diastolic Blood Pressure 74 mmHg    Mean Arterial Pressure 92 mmHg    BP Site Right arm    Oxygen Saturation 97 %      Measurements from flowsheet : Measurements   2018 9:44 AM CDT Height Measured - Standard 71.75 in    Weight Measured - Standard 320.2 lb    BSA 2.71 m2    Body Mass Index 43.73 kg/m2  HI      PHQ 9  score: 3   General:  Alert and oriented, No acute distress.    HENT:  Normocephalic.    Neck:  Supple, No lymphadenopathy, No thyromegaly.    Respiratory:  Lungs are clear to auscultation, Respirations are non-labored, Breath sounds are equal, Symmetrical chest wall expansion.    Cardiovascular:  Normal rate, Regular rhythm, No murmur, No gallop, Good pulses equal in all extremities, Normal peripheral perfusion, No edema.    Gastrointestinal:  Soft, Non-tender, Non-distended, Normal bowel sounds, No organomegaly.    Musculoskeletal:  Normal range of motion, No swelling, No deformity, Normal gait.    Integumentary:  Warm, Dry, Pink, No foot ulcers or skin lesions..    Feet:  Normal by visual exam, Sensation intact, By monofilament exam.    Neurologic:  Alert, Oriented, Normal sensory, Normal motor function, No focal deficits.    Psychiatric:  Cooperative, Appropriate mood & affect.

## 2022-02-16 NOTE — PROGRESS NOTES
Patient:   JAN SHIPLEY            MRN: [id number]            FIN: [id number]               Age:   70 years     Sex:  Female     :  1950   Associated Diagnoses:   Right hip pain; Right leg pain   Author:   Ligia Arias      Visit Information      Date of Service: 2020 10:34 am  Performing Location: [facility]  Encounter#: [id number]      Primary Care Provider (PCP):  Raphael Molina MD    NPI# [id number]      Referring Provider:  Ligia Arias    NPI# [id number]      Chief Complaint   2020 10:37 AM CDT   Patient presents with low right back pain radiating into the right buttock x2 weeks. No known injury        History of Present Illness   here alone today  with right low back pain radiating into the right buttock x2 weeks, no known injury  this is different than the upper back pain she had a month ago, that resolved  Really painful to walk and sit and move from up to down  uses tylenol bid and that resolves it, sleeps without problems  long hx of osteoarthritis  denies any new activity  lives iwth daughter who I asked to come into the clinic from the parking lot to talk  daughter shares that Jan used to sit in a chair in the the tv room and do needle point, recently young son is sitting in that chair to play video games and Jan is sitting on a bed, sometimes sideways and cross legged  some shooting pain down the right leg, pain is only one sided      Health Status   Allergies:    Allergic Reactions (Selected)  No Known Medication Allergies   Medications:  (Selected)   Prescriptions  Prescribed  CeleBREX 200 mg oral capsule: = 1 cap(s) ( 200 mg ), PO, Daily, # 90 cap(s), 0 Refill(s), Type: Maintenance, Pharmacy: [facility], 1 cap(s) Oral daily, 71.75, in, 19 9:20:00 CST, Height Measured, 326, lb, 19 9:20:00 CST, Weight Measured  atorvastatin 20 mg oral tablet: = 1 tab(s) ( 20 mg ), PO, Daily, # 90 tab(s), 0 Refill(s), Type: Maintenance,  Pharmacy: [facility], 1 tab(s) Oral daily, 71.75, in, 12/11/19 9:20:00 CST, Height Measured, 326, lb, 12/11/19 9:20:00 CST, Weight Measured  citalopram 40 mg oral tablet: = 1 tab(s) ( 40 mg ), PO, Daily, # 90 tab(s), 0 Refill(s), Type: Maintenance, Pharmacy: [facility], 1 tab(s) Oral daily, 71.75, in, 12/11/19 9:20:00 CST, Height Measured, 326, lb, 12/11/19 9:20:00 CST, Weight Measured  clobetasol 0.05% topical cream: 1 chichi, Topical, bid, # 30 gm, 1 Refill(s), Type: Maintenance, Pharmacy: [facility], 1 chichi Topical bid  gabapentin 100 mg oral capsule: = 2 cap(s) ( 200 mg ), PO, bid, # 360 cap(s), 0 Refill(s), Type: Maintenance, Pharmacy: [facility], 2 cap(s) Oral bid, 71.75, in, 12/11/19 9:20:00 CST, Height Measured, 326, lb, 12/11/19 9:20:00 CST, Weight Measured  losartan 100 mg oral tablet: = 1 tab(s) ( 100 mg ), PO, Daily, # 90 tab(s), 0 Refill(s), Type: Maintenance, Pharmacy: [facility], 1 tab(s) Oral daily, 71.75, in, 12/11/19 9:20:00 CST, Height Measured, 326, lb, 12/11/19 9:20:00 CST, Weight Measured  metFORMIN 500 mg oral tablet: = 1 tab(s) ( 500 mg ), PO, BID, # 180 tab(s), 0 Refill(s), Type: Maintenance, Pharmacy: [facility], ., 1 tab(s) Oral bid, 71.75, in, 12/11/19 9:20:00 CST, Height Measured, 326, lb, 12/11/19 9:20:00 CST, Weight Measured  risperiDONE 1 mg oral tablet: = 1.5 tab(s), Oral, daily, # 135 EA, 0 Refill(s), Type: Maintenance, Pharmacy: [facility], 1.5 tab(s) Oral daily, 71.75, in, 12/11/19 9:20:00 CST, Height Measured, 326, lb, 12/11/19 9:20:00 CST, Weight Measured,    Medications          *denotes recorded medication          atorvastatin 20 mg oral tablet: 20 mg, 1 tab(s), PO, Daily, 90 tab(s), 0 Refill(s).          CeleBREX 200 mg oral capsule: 200 mg, 1 cap(s), PO, Daily, 90 cap(s), 0 Refill(s).          citalopram 40 mg oral tablet: 40 mg, 1 tab(s), PO, Daily, 90 tab(s), 0 Refill(s).          clobetasol 0.05% topical cream: 1 chichi,  Topical, bid, 30 gm, 1 Refill(s).          gabapentin 100 mg oral capsule: 200 mg, 2 cap(s), PO, bid, 360 cap(s), 0 Refill(s).          losartan 100 mg oral tablet: 100 mg, 1 tab(s), PO, Daily, 90 tab(s), 0 Refill(s).          metFORMIN 500 mg oral tablet: 500 mg, 1 tab(s), PO, BID, 180 tab(s), 0 Refill(s).          risperiDONE 1 mg oral tablet: 1.5 tab(s), Oral, daily, 135 EA, 0 Refill(s).       Problem list:    All Problems  Colon cancer / SNOMED CT 915531993 / Confirmed  Depression, major, recurrent, in complete remission / ICD-9-.36 / Confirmed  Diabetes Mellitus / ICD-9- / Confirmed  Diabetic Neuropathy / ICD-9-.60 / Confirmed  Hyperlipidemia NOS / SNOMED CT 223107269 / Confirmed  Hypertension / ICD-9-.91 / Confirmed  Long term current use of oral hypoglycemic drug / SNOMED CT 612731446 / Confirmed  Morbid Obesity / ICD-9-.01 / Probable  Primary osteoarthritis / SNOMED CT 870235177 / Confirmed      Histories   Past Medical History:    Active  Morbid Obesity (278.01)  Hyperlipidemia NOS (261402092)  Hypertension (997.91)  Diabetes Mellitus (250)  Depression, major, recurrent, in complete remission (296.36)  Diabetic Neuropathy (250.60)  Colon cancer (531956942)   Family History:    Diabetes mellitus  Father  Brother  Grandmother (P)  Hypertension  Grandmother (M)  Heart disease  Grandfather (P)  Father  Grandmother (M)  Substance abuse  Father     Procedure history:    Colonoscopy (SNOMED CT 116899203) performed by Leela Crooks MD on 5/13/2016 at 65 Years.  Comments:  5/26/2016 8:02 AM L[facility] - Moon Alicia  Indication:  Follow up previous lesion.  Sedation:  MAC  Impression:  Diverticulosis of lg int w/o perforation or abscess with bl.  Personal history of malignant neoplasm of large in.  Benign neoplasm of ascending colon.  Colonoscopy (SNOMED CT 334951380) performed by Leela Crooks MD on 10/23/2015 at 65 Years.  Comments:  11/12/2015 8:43 AM Moon Cortes  Indication:   History of colon cancer.  Sedation: MAC  sigmoid colon resection in 2014 at 64 Years.  gall bladder removed.  tubes tied.  appendectomy.  benign lump from back of neck removed.   x 3.   Social History:        Alcohol Assessment: Denies Alcohol Use            Never      Tobacco Assessment: Past            Past      Substance Abuse Assessment            Never      Employment and Education Assessment            Retired      Home and Environment Assessment            Marital status: .  Lives with Self.  3 children.  Living situation: Home/Independent.        Physical Examination   VS/Measurements   General:  Alert and oriented, tenderness right mid buttock with palpation and describes shooting down sciatic back of leg to knee  needed to manual use hands to lift right leg up to the cot  delayed patellar reflexes bilat  difficulty moving sit to stand.    Integumentary:  Warm, Dry, Pink.       Review / Management   Radiology results   X-ray, X-ray reviewed with patient, no abnormality noted.  Will await radiology over-read and if differs such that treatment would be altered,  will notify patient. , given toradol 60mg IM then sent to xray  not sure if pain was better 30 min after toradol, but not worse      Impression and Plan   Diagnosis     Right hip pain (EUQ82-HC M25.551).     Right leg pain (GKG11-VX M79.604).     Plan:  onset of nerve pain likely from sitting crooked and cross legged on bed rather than in her usual chair. advised kpain control with tylenol and ibuprofen, ice to buttock and good body mechanics/positioning with sitting  reassured no signs of significant right hip osteoarthritis.    Patient Instructions:       Counseled: Patient, Regarding diagnosis, Regarding treatment, Regarding medications, Verbalized understanding, Counseled on symptomatic management. Return to clinic for re evaluation if worsening, simply not improving, or failure to resolve.   .

## 2022-02-16 NOTE — TELEPHONE ENCOUNTER
Entered by Harshil Mejía CMA on August 11, 2021 5:04:12 PM CDT  ---------------------  From: Harshil Mejía CMA   To: [facility]    Sent: 8/11/2021 5:04:12 PM CDT  Subject: Medication Management     ** Submitted: **  Order:metFORMIN (metFORMIN 500 mg oral tablet)  See Instructions  ONE (1) TAB(S) ORAL TWICE DAILY  Qty:  180 tab(s)        Days Supply:  90        Refills:  0          Substitutions Allowed     Route To [facility]    Signed by Harshil Mejía CMA  8/11/2021 10:03:00 PM [facility]    ** Submitted: **  Complete:metFORMIN (metFORMIN 500 mg oral tablet)   Signed by Harshil Mejía CMA  8/11/2021 10:04:00 PM [facility]    ** Not Approved:  **  metFORMIN (METFORMIN 500MG)  ONE (1) TAB(S) ORAL TWICE DAILY  Qty:  180 tab(s)        Days Supply:  90        Refills:  0          Substitutions Allowed     Route To [facility]   Signed by Harshil Mejía CMA            ** Patient matched by Harshil Mejía CMA on 8/11/2021 5:02:32 PM CDT **      ------------------------------------------  From: [facility]  To: Raphael Hsu PA-C  Sent: August 11, 2021 2:21:16 PM CDT  Subject: Medication Management  Due: August 12, 2021 12:22:42 AM CDT     ** On Hold Pending Signature **     Drug: metFORMIN (metFORMIN 500 mg oral tablet), ONE (1) TAB(S) ORAL TWICE DAILY  Quantity: 180 tab(s)  Days Supply: 90  Refills: 0  Substitutions Allowed  Notes from Pharmacy:     Dispensed Drug: metFORMIN (metFORMIN 500 mg oral tablet), ONE (1) TAB(S) ORAL TWICE DAILY  Quantity: 180 tab(s)  Days Supply: 90  Refills: 0  Substitutions Allowed  Notes from Pharmacy:  ------------------------------------------

## 2022-02-16 NOTE — LETTER
(Inserted Image. Unable to display)       [address] [place] [address]  August 31, 2021      JAN SHIPLEY      [address] [place] [address]        Dear JAN,    Thank you for selecting [facility] for your healthcare needs.  Below you will find the results of your recent tests done at our clinic.     B-12 borderline low. I have ordered an additional test to be done on the blood.      Result Name Current Result Reference Range   Vitamin B12 Level (pg/mL)  202 8/30/2021 200 - 1,100   TSH (mIU/L)  1.22 8/30/2021 0.40 - 4.50       Please contact me or my assistant at [phone] if you have any questions.     Sincerely,        Jameel Faulkner MD

## 2022-02-16 NOTE — PROGRESS NOTES
Patient:   JAN SHIPLEY            MRN: [id number]            FIN: [id number]               Age:   69 years     Sex:  Female     :  1950   Associated Diagnoses:   Hypertension; Hyperlipidemia NOS; Diabetes Mellitus; Primary osteoarthritis   Author:   Raphael Molina MD      Impression and Plan   Diagnosis     Hypertension (EXO43-XL I10).     Course:  Well controlled.    Orders     Orders   Charges (Evaluation and Management):  27325 office outpatient visit 25 minutes (Charge) (Order): Quantity: 1, Diabetes Mellitus  Hypertension  Hyperlipidemia NOS.     Orders (Selected)   Prescriptions  Prescribed  losartan 100 mg oral tablet: = 1 tab(s) ( 100 mg ), PO, Daily, # 90 tab(s), 1 Refill(s), Type: Maintenance, Pharmacy: [facility], 1 tab(s) Oral daily.     Diagnosis     Hyperlipidemia NOS (UXJ50-QX E78.00).     Course:  Progressing as expected.    Orders     Orders (Selected)   Prescriptions  Prescribed  atorvastatin 20 mg oral tablet: = 1 tab(s) ( 20 mg ), PO, Daily, # 90 tab(s), 1 Refill(s), Type: Maintenance, Pharmacy: [facility], Needs to be seen for more refills., 1 tab(s) Oral daily.     Diagnosis     Diabetes Mellitus (MZI95-MB E11.9).     Course:  Well controlled.    Orders     Orders (Selected)   Prescriptions  Prescribed  metFORMIN 500 mg oral tablet: = 1 tab(s) ( 500 mg ), PO, BID, # 180 tab(s), 1 Refill(s), Type: Maintenance, Pharmacy: [facility], Needs to be seen for more refills., 1 tab(s) Oral bid.     Diagnosis     Primary osteoarthritis (KCI77-MA M19.91).     Course:  Progressing as expected.    Orders     Orders (Selected)   Prescriptions  Prescribed  CeleBREX 200 mg oral capsule: = 1 cap(s) ( 200 mg ), PO, Daily, # 90 cap(s), 1 Refill(s), Type: Maintenance, Pharmacy: [facility], 1 cap(s) Oral daily.        Visit Information      Date of Service: 2019 09:18 am  Performing Location: [facility]  Encounter#: [id number]      Primary Care  Provider (PCP):  Raphael Molina MD# [id number]      Referring Provider:  Raphael Molina MD# [id number]   Visit type:  New symptom.    Accompanied by:  No one.    Source of history:  Self.    History limitation:  None.       Chief Complaint   Chief complaint discussed and confirmed correct.     2019 9:20 AM CST   Pt here for med ck        History of Present Illness             The patient presents for follow-up evaluation of diabetes.  The quality of the patient's diabetes is described as being unchanged from previous visit.  Exacerbating factors consist of noncompliance with diet and sedentary lifestyle.  Relieving factors consist of medication.  Associated symptoms consist of none.  Medical encounters: none.  Compliance problems: with diet and with weight management.  Glucose results: within target range.  Hemoglobin A1c results: < 6%.  Lifestyle modification: weight reduction, diet, increased physical activity.  Medications: See medication list.  Additional pertinent history: last podiatric foot exam: 10/29/2018 and eye exam recommended.  Target A1c: 7.0    Target BS: 125      Fastin      Post prandial:150    Meter BS averages: NA    Hypoglycemia      Frequency:never      Severity:      Awareness:      Treatment:    Microvascular      Eye:neg      Kidney:neg      Neuro:peripheral neuropathy      Autonomic:neg       Macrovascular      CAD:neg      PVD:neg      HLD:pos      HTN:pos        Health Care Maintenance      Aspirin:yes      Pneumovax:yes       Flu vaccine:refused      Foot exam:yes      Tobacco:no    Diet History:SAD    Laboratory      Last A1c:6.5      Last LDL:89      Last creatinine0.73      Last MARIS: 6.               The patient presents for follow-up evaluation of hypertension.  The quality of hypertension symptom(s) since the patient's last visit is described as being unchanged.  The severity of the hypertension symptom(s) since the last visit is moderate.  Since the  patient's last visit, the timing/course of hypertension symptom(s) is constant.  Exacerbating factors consist of none.  Relieving factors consist of medication.  Associated symptoms consist of none.  Prior treatment consists of lifestyle modification (weight reduction, dietary sodium restriction, increased physical activity).  Medical encounters: none.  Compliance problems: none.        Interval History   Cholesterol Management   Total cholesterol results < 200 mg/dL (optimal).  LDL cholesterol results < 100 mg/dL (optimal).  HDL cholesterol results 40-60 mg/dl.  Triglyceride results < 150 mg/dL (normal).  The course is progressing as expected.  Compliance problems: none.  The effect on daily activities is no change in activity level and no change in eating habits.  Associated symptoms characterized by no fatigue, chest pain, joint pain, muscle weakness or myalgias.        Review of Systems   Constitutional:  Negative.    Eye:  Negative.    Ear/Nose/Mouth/Throat:  Negative.    Respiratory:  Negative.    Cardiovascular:  Negative.    Gastrointestinal:  Fecal incontinence.    Genitourinary:  Urinary incontinence.    Hematology/Lymphatics:  Negative.    Endocrine:  Negative.    Immunologic:  Negative.    Musculoskeletal:  Joint pain.    Integumentary:  Negative.    Neurologic:  Numbness, Tingling.    Psychiatric:  Negative.    All other systems reviewed and negative      Health Status   Allergies:    Allergic Reactions (Selected)  No Known Medication Allergies   Medications:  (Selected)   Prescriptions  Prescribed  CeleBREX 200 mg oral capsule: = 1 cap(s) ( 200 mg ), PO, Daily, # 90 cap(s), 1 Refill(s), Type: Maintenance, Pharmacy: [facility], 1 cap(s) Oral daily  atorvastatin 20 mg oral tablet: = 1 tab(s) ( 20 mg ), PO, Daily, # 90 tab(s), 1 Refill(s), Type: Maintenance, Pharmacy: [facility], Needs to be seen for more refills., 1 tab(s) Oral daily  citalopram 40 mg oral tablet: = 1 tab(s) ( 40 mg ),  PO, Daily, # 90 tab(s), 1 Refill(s), Type: Maintenance, Pharmacy: [facility], Needs to be seen for more refills., 1 tab(s) Oral daily  clobetasol 0.05% topical cream: 1 chichi, Topical, bid, # 30 gm, 1 Refill(s), Type: Maintenance, Pharmacy: [facility], 1 chichi Topical bid  gabapentin 100 mg oral capsule: = 2 cap(s) ( 200 mg ), PO, bid, # 360 cap(s), 1 Refill(s), Type: Maintenance, Pharmacy: [facility], Needs to be seen for more refills., 2 cap(s) Oral bid  losartan 100 mg oral tablet: = 1 tab(s) ( 100 mg ), PO, Daily, # 90 tab(s), 1 Refill(s), Type: Maintenance, Pharmacy: [facility], 1 tab(s) Oral daily  metFORMIN 500 mg oral tablet: = 1 tab(s) ( 500 mg ), PO, BID, # 180 tab(s), 1 Refill(s), Type: Maintenance, Pharmacy: [facility], Needs to be seen for more refills., 1 tab(s) Oral bid  risperiDONE 1 mg oral tablet: = 1.5 tab(s), Oral, daily, # 135 EA, 1 Refill(s), Type: Maintenance, Pharmacy: [facility], Pt due this month for appt, 1.5 tab(s) Oral daily   Problem list:    All Problems  Colon cancer / SNOMED CT 528041657 / Confirmed  Depression, major, recurrent, in complete remission / ICD-9-.36 / Confirmed  Diabetes Mellitus / ICD-9- / Confirmed  Diabetic Neuropathy / ICD-9-.60 / Confirmed  Hyperlipidemia NOS / SNOMED CT 658589064 / Confirmed  Hypertension / ICD-9-.91 / Confirmed  Long term current use of oral hypoglycemic drug / SNOMED CT 542241366 / Confirmed  Morbid Obesity / ICD-9-.01 / Probable  Primary osteoarthritis / SNOMED CT 478004265 / Confirmed      Histories   Past Medical History:    Active  Morbid Obesity (278.01)  Hyperlipidemia NOS (323061323)  Hypertension (997.91)  Diabetes Mellitus (250)  Depression, major, recurrent, in complete remission (296.36)  Diabetic Neuropathy (250.60)  Colon cancer (811925418)   Family History:    Diabetes mellitus  Father  Brother  Grandmother (P)  Hypertension  Grandmother (M)  Heart  disease  Grandfather (P)  Father  Grandmother (M)  Substance abuse  Father     Procedure history:    Colonoscopy (SNOMED CT 838985384) performed by Leela Crooks MD on 2016 at 65 Years.  Comments:  2016 8:02 AM CDT - Moon Alicia  Indication:  Follow up previous lesion.  Sedation:  MAC  Impression:  Diverticulosis of lg int w/o perforation or abscess with bl.  Personal history of malignant neoplasm of large in.  Benign neoplasm of ascending colon.  Colonoscopy (SNOMED CT 921173887) performed by Leela Crooks MD on 10/23/2015 at 65 Years.  Comments:  2015 8:43 AM CST - Moon Alicia  Indication:  History of colon cancer.  Sedation: MAC  sigmoid colon resection in  at 64 Years.  gall bladder removed.  tubes tied.  appendectomy.  benign lump from back of neck removed.   x 3.   Social History:        Alcohol Assessment: Denies Alcohol Use            Never      Tobacco Assessment: Past            Past      Substance Abuse Assessment            Never      Employment and Education Assessment            Retired      Home and Environment Assessment            Marital status: .  Lives with Self.  3 children.  Living situation: Home/Independent.        Physical Examination   Vital signs reviewed  and within acceptable limits    Vital Signs   2019 9:20 AM CST Peripheral Pulse Rate 67 bpm    Systolic Blood Pressure 124 mmHg    Diastolic Blood Pressure 80 mmHg    Mean Arterial Pressure 95 mmHg    Oxygen Saturation 98 %      Measurements from flowsheet : Measurements   2019 9:20 AM CST Height Measured - Standard 71.75 in    Weight Measured - Standard 326 lb    BSA 2.73 m2    Body Mass Index 44.52 kg/m2  HI      PHQ 9 score: 3   General:  Alert and oriented, No acute distress.    HENT:  Normocephalic.    Neck:  Supple, No lymphadenopathy, No thyromegaly.    Respiratory:  Lungs are clear to auscultation, Respirations are non-labored, Breath sounds are equal, Symmetrical chest wall  expansion.    Cardiovascular:  Normal rate, Regular rhythm, No murmur, No gallop, Good pulses equal in all extremities, Normal peripheral perfusion, No edema.    Gastrointestinal:  Soft, Non-tender, Non-distended, Normal bowel sounds, No organomegaly.    Musculoskeletal:  Normal range of motion, No swelling, No deformity, Normal gait.    Integumentary:  Warm, Dry, Pink, No foot ulcers or skin lesions..    Feet:  Normal by visual exam, Sensation intact, By monofilament exam.    Neurologic:  Alert, Oriented, Normal sensory, Normal motor function, No focal deficits.    Psychiatric:  Cooperative, Appropriate mood & affect.
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
## 2022-02-16 NOTE — NURSING NOTE
Comprehensive Intake Entered On:  1/19/2021 10:55 AM CST    Performed On:  1/19/2021 10:44 AM CST by Harshil Mejía CMA               Summary   Chief Complaint :   Pt here for a chronic disease visit and fasting labwork.  Pt also c/o diarrhea on and off x 1-2 years.   Weight Measured :   308 lb(Converted to: 308 lb 0 oz, 139.706 kg)    Height Measured :   71.75 in(Converted to: 6 ft 0 in, 182.24 cm)    Body Mass Index :   42.06 kg/m2 (HI)    Body Surface Area :   2.66 m2   Height/Length Estimated :   71.75 in(Converted to: 6 ft 0 in, 182.24 cm)    Systolic Blood Pressure :   130 mmHg   Diastolic Blood Pressure :   64 mmHg   Mean Arterial Pressure :   86 mmHg   Peripheral Pulse Rate :   64 bpm   BP Site :   Left upper leg   Pulse Site :   Radial artery   Temperature Tympanic :   97.9 DegF(Converted to: 36.6 DegC)    Respiratory Rate :   16 br/min   Oxygen Saturation :   95 %   Harshil Mejía CMA - 1/19/2021 10:44 AM CST   Health Status   Allergies Verified? :   Yes   Medication History Verified? :   Yes   Medical History Verified? :   Yes   Pre-Visit Planning Status :   Completed   Tobacco Use? :   Former smoker   Harshil Mejía CMA - 1/19/2021 10:44 AM CST   Meds / Allergies   (As Of: 1/19/2021 10:55:30 AM CST)   Allergies (Active)   No Known Medication Allergies  Estimated Onset Date:   Unspecified ; Created By:   Charlotte Loza CMA; Reaction Status:   Active ; Category:   Drug ; Substance:   No Known Medication Allergies ; Type:   Allergy ; Updated By:   Charlotte Loza CMA; Reviewed Date:   1/19/2021 10:52 AM CST        Medication List   (As Of: 1/19/2021 10:55:30 AM CST)   Prescription/Discharge Order    celecoxib  :   celecoxib ; Status:   Prescribed ; Ordered As Mnemonic:   CeleBREX 200 mg oral capsule ; Simple Display Line:   200 mg, 1 cap(s), PO, Daily, 30 cap(s), 0 Refill(s) ; Ordering Provider:   Raphael Molina MD; Catalog Code:   celecoxib ; Order Dt/Tm:   1/6/2021 11:04:00 AM CST          citalopram  :    citalopram ; Status:   Prescribed ; Ordered As Mnemonic:   citalopram 40 mg oral tablet ; Simple Display Line:   40 mg, 1 tab(s), PO, Daily, 30 tab(s), 0 Refill(s) ; Ordering Provider:   Raphael Molina MD; Catalog Code:   citalopram ; Order Dt/Tm:   1/6/2021 11:04:47 AM CST          metFORMIN  :   metFORMIN ; Status:   Prescribed ; Ordered As Mnemonic:   metFORMIN 500 mg oral tablet ; Simple Display Line:   500 mg, 1 tab(s), PO, BID, 60 tab(s), 0 Refill(s) ; Ordering Provider:   Raphael Molina MD; Catalog Code:   metFORMIN ; Order Dt/Tm:   1/6/2021 11:03:06 AM CST          risperiDONE  :   risperiDONE ; Status:   Prescribed ; Ordered As Mnemonic:   risperiDONE 1 mg oral tablet ; Simple Display Line:   1.5 tab(s), Oral, daily, 45 tab(s), 0 Refill(s) ; Ordering Provider:   Raphael Molina MD; Catalog Code:   risperiDONE ; Order Dt/Tm:   1/6/2021 11:03:37 AM CST          losartan  :   losartan ; Status:   Prescribed ; Ordered As Mnemonic:   losartan 100 mg oral tablet ; Simple Display Line:   100 mg, 1 tab(s), PO, Daily, 30 tab(s), 0 Refill(s) ; Ordering Provider:   Raphael Molina MD; Catalog Code:   losartan ; Order Dt/Tm:   1/6/2021 11:02:29 AM CST          atorvastatin  :   atorvastatin ; Status:   Prescribed ; Ordered As Mnemonic:   atorvastatin 20 mg oral tablet ; Simple Display Line:   20 mg, 1 tab(s), Oral, daily, *NEEDS LABS AND ANNUAL VISIT WITH DR. MOLINA PRIOR TO ADDITIONAL REFILLS*, 30 tab(s), 0 Refill(s) ; Ordering Provider:   Raphael Hsu PA-C; Catalog Code:   atorvastatin ; Order Dt/Tm:   1/6/2021 10:52:42 AM CST          gabapentin  :   gabapentin ; Status:   Prescribed ; Ordered As Mnemonic:   gabapentin 100 mg oral capsule ; Simple Display Line:   200 mg, 2 cap(s), PO, bid, 360 cap(s), 0 Refill(s) ; Ordering Provider:   Raphael Hsu PA-C; Catalog Code:   gabapentin ; Order Dt/Tm:   10/9/2020 12:29:43 PM CDT          clobetasol topical  :   clobetasol topical ; Status:   Prescribed ; Ordered As  Mnemonic:   clobetasol 0.05% topical cream ; Simple Display Line:   1 chichi, Topical, bid, 30 gm, 1 Refill(s) ; Ordering Provider:   Raphael Molina MD; Catalog Code:   clobetasol topical ; Order Dt/Tm:   12/11/2019 3:12:04 PM CST            ID Risk Screen   Recent Travel History :   No recent travel   Family Member Travel History :   No recent travel   Other Exposure to Infectious Disease :   Unknown   Harshil Mejía CMA - 1/19/2021 10:44 AM CST   Social History   Social History   (As Of: 1/19/2021 10:55:30 AM CST)   Alcohol:  Denies Alcohol Use      Never   (Last Updated: 7/16/2013 11:26:10 AM CDT by Raphael Molina MD)          Tobacco:  Past      Past   (Last Updated: 7/16/2013 11:26:02 AM CDT by Raphael Molina MD)   Former smoker, quit more than 30 days ago   (Last Updated: 1/19/2021 10:45:29 AM CST by Harshil Mejía CMA)          Electronic Cigarette/Vaping:        Electronic Cigarette Use: Never.   (Last Updated: 1/19/2021 10:45:33 AM CST by Harshil Mejía CMA)          Substance Abuse:        Never   (Last Updated: 7/16/2013 11:25:51 AM CDT by Raphael Molina MD)          Employment/School:        Retired   (Last Updated: 7/16/2013 11:26:19 AM CDT by Raphael Molina MD)          Home/Environment:        Marital status: .  Lives with Self.  3 children.  Living situation: Home/Independent.   (Last Updated: 7/16/2013 11:26:41 AM CDT by Raphael Molina MD)

## 2022-02-16 NOTE — PROGRESS NOTES
Patient:   JAN SHIPLEY            MRN: [id number]            FIN: [id number]               Age:   66 years     Sex:  Female     :  1950   Associated Diagnoses:   Osteoarthritis of left knee; Short-term memory loss; Chronic leg pain   Author:   Raphael Molina MD      Impression and Plan   Diagnosis     Osteoarthritis of left knee (QLG38-HS M17.9).     Course:  Worsening.    Plan:  Try OTC NSAIDs - if not controlling the problem consider ortho consult.    Orders     Orders   Charges (Evaluation and Management):  [id number] office outpatient visit 25 minutes (Charge) (Order): Quantity: 1, Knee pain  Chronic leg pain  Osteoarthritis of left knee.     Orders (Selected)   Outpatient Orders  Completed  XR Knee AP/Lat Left (Request): Knee pain.     Diagnosis     Short-term memory loss (BDM89-UM R41.3).     Course:  Worsening.    Diagnosis     Chronic leg pain (HHO88-BP M79.606).     Course:  Worsening.    Orders     Orders (Selected)   Outpatient Orders  Ordered  Referral (Request): 17 12:00:00 CDT, Referred to: Cardiology, Referred to: Peripheral vascular disease clinic, Chronic leg pain.        Visit Information      Date of Service: 2017 09:47 am  Performing Location: [facility]  Encounter#: [id number]   Visit type:  New symptom.    Accompanied by:  No one.    Source of history:  Self.    History limitation:  None.       Chief Complaint   2017 9:57 AM CDT    Pt here for feet/leg pain        History of Present Illness             The patient presents with a knee problem.  The location of the knee problem is the left knee.  The knee problem is characterized by aching.  The severity of the knee problem is moderate.  The timing/course of symptom(s) associated with the knee problem is constant.  The knee problem has lasted for many months.  Radiation of pain: none.  The context of the knee problem: occurred after trauma.  Exacerbating factors consist of movement and walking.  Relieving  factors consist of none.  Associated symptoms consist of none.               The patient presents with memory loss.  The memory loss is described as forgetfulness and an inability to recall common words.  The severity of the memory loss is moderate.  The memory loss symptom(s) is constant.  The symptom(s) of memory loss has lasted for several months.  The context of the memory loss: occurred gradually over a period of time.  There are no modifying factors.  Associated symptoms consist of denies altered behavior, denies altered level of consciousness, denies anxiety, denies confusion, denies depression, denies headache, denies insomnia, denies lethargy, denies loss of coordination, denies seizures and denies tremors.        Interval History   Peripheral Vascular Disease   The effect on daily activities is a decrease in (activity level, ambulation, ability to perform work/ school duties).  Associated symptom(s) consist of claudication, foot pain, leg cramps, paresthesia, denies hair loss, denies peripheral edema and denies lower extremity ulcer.        Review of Systems   Constitutional:  Negative.    Eye:  Negative.    Ear/Nose/Mouth/Throat:  Negative.    Respiratory:  Negative.    Cardiovascular:  Negative except as documented in history of present illness.    Gastrointestinal:  Negative.    Genitourinary:  Negative.    Hematology/Lymphatics:  Negative.    Endocrine:  Negative.    Immunologic:  Negative.    Musculoskeletal:  Negative except as documented in history of present illness.    Integumentary:  Negative.    Neurologic:  Negative except as documented in history of present illness.    Psychiatric:  Negative.    All other systems reviewed and negative      Health Status   Allergies:    Allergic Reactions (Selected)  No Known Medication Allergies   Medications:  (Selected)   Prescriptions  Prescribed  aspirin 325 mg oral tablet: 1 tab(s) ( 325 mg ), po, daily, # 30 tab(s), 0 Refill(s), Type: Maintenance,  Pharmacy: [facility], 1 tab(s) po daily  atorvastatin 20 mg oral tablet: 1 tab(s) ( 20 mg ), PO, Daily, # 90 tab(s), 1 Refill(s), Type: Maintenance, Pharmacy: [facility], 1 tab(s) po daily  citalopram 40 mg oral tablet: 1 tab(s) ( 40 mg ), PO, Daily, # 90 tab(s), 1 Refill(s), Type: Maintenance, Pharmacy: [facility], 1 tab(s) po daily  gabapentin 100 mg oral capsule: 1 cap(s) ( 100 mg ), PO, daily, # 90 cap(s), 1 Refill(s), Type: Maintenance, Pharmacy: [facility], 1 cap(s) po daily  hydroCHLOROthiazide-losartan 12.5 mg-100 mg oral tablet: 1 tab(s), po, daily, # 90 tab(s), 1 Refill(s), Type: Maintenance, Pharmacy: [facility], 1 tab(s) po daily  metFORMIN 500 mg oral tablet: 1 tab(s) ( 500 mg ), PO, BID, # 180 tab(s), 1 Refill(s), Type: Maintenance, Pharmacy: [facility], 1 tab(s) po bid  risperiDONE 1 mg oral tablet: 1.5 tab(s) ( 1.5 mg ), po, daily, # 135 tab(s), 1 Refill(s), Type: Maintenance, Pharmacy: [facility], 1.5 tab(s) po daily   Problem list:    All Problems  Colon cancer / SNOMED CT 846398761 / Confirmed  Depression, major, recurrent, in complete remission / ICD-9-.36 / Confirmed  Diabetes Mellitus / ICD-9- / Confirmed  Diabetic Neuropathy / ICD-9-.60 / Confirmed  Hyperlipidemia NOS / SNOMED CT 833338065 / Confirmed  Hypertension / ICD-9-.91 / Confirmed  Morbid Obesity / ICD-9-.01 / Probable      Histories   Past Medical History:    Active  Morbid Obesity (278.01)  Hyperlipidemia NOS (462923596)  Hypertension (997.91)  Diabetes Mellitus (250)  Depression, major, recurrent, in complete remission (296.36)  Diabetic Neuropathy (250.60)  Colon cancer (116183177)   Family History:    Diabetes mellitus  Father  Brother  Grandmother (P)  Hypertension  Grandmother (M)  Heart disease  Grandfather (P)  Father  Grandmother (M)  Substance abuse  Father     Procedure history:    Colonoscopy (SNOMED CT 113084429) performed by Valeriy DECKER,  Leela on 2016 at 65 Years.  Comments:  2016 8:02 AM - Moon Alicia  Indication:  Follow up previous lesion.  Sedation:  MAC  Impression:  Diverticulosis of lg int w/o perforation or abscess with bl.  Personal history of malignant neoplasm of large in.  Benign neoplasm of ascending colon.  Colonoscopy (SNOMED CT 139211034) performed by Leela Crooks MD on 10/23/2015 at 65 Years.  Comments:  2015 8:43 AM - Moon Alicia  Indication:  History of colon cancer.  Sedation: MAC  sigmoid colon resection in  at 64 Years.  gall bladder removed.  tubes tied.  appendectomy.  benign lump from back of neck removed.   x 3.   Social History:        Alcohol Assessment: Denies Alcohol Use            Never      Tobacco Assessment: Past            Past      Substance Abuse Assessment            Never      Employment and Education Assessment            Retired      Home and Environment Assessment            Marital status: .  Lives with Self.  3 children.  Living situation: Home/Independent.        Physical Examination   Vital Signs   2017 9:57 AM CDT Peripheral Pulse Rate 76 bpm    Pulse Site Radial artery    HR Method Manual    Systolic Blood Pressure 138 mmHg    Diastolic Blood Pressure 80 mmHg    Mean Arterial Pressure 99 mmHg    BP Site Right arm    BP Method Manual      Measurements from flowsheet : Measurements   2017 9:57 AM CDT Height Measured - Standard 71.75 in    Weight Measured - Standard 311.8 lb    BSA 2.67 m2    Body Mass Index 42.58 kg/m2      General:  No acute distress.    Neck:  Supple, No lymphadenopathy, No thyromegaly.    Respiratory:  Lungs are clear to auscultation, Respirations are non-labored, Breath sounds are equal, Symmetrical chest wall expansion.    Cardiovascular:  Normal rate, Regular rhythm, No murmur, No gallop, No edema, dorsalis pedis and posterior tibial pulses not palpable.  Prolonged capillary refill in both feet..    Gastrointestinal:  Soft, Non-tender,  Non-distended, Normal bowel sounds, No organomegaly.    Musculoskeletal:  Normal range of motion, Normal strength, No tenderness, No swelling, No deformity.    Integumentary:  Warm, Dry, Pink.    Neurologic:  Alert, Oriented.    Cognition and Speech:  Speech clear and coherent, Functional cognition intact.    Psychiatric:  Cooperative, Appropriate mood & affect, Normal judgment.       Review / Management   Radiology results   X-ray, Two views of left knee , Interpretation:  moderate joint space narrowing and osteophyte formation signalling osteoarthritis, Radiograph(s) result as interpreted by ordering provider reviewed with patient.  Radiologist will also interpret the radiograph(s) and patient will be informed if result is modified when both interpretations are compared.

## 2022-02-16 NOTE — LETTER
"(Inserted Image. Unable to display)   [facility] [address]  July 16, 2019      JAN SHIPLEY  [address]  [place], [place] [id number]        Dear JAN,     Thank you for selecting [facility] for your healthcare needs. Below you will find the results of the recent tests done at our clinic.      The pathologist feels the biopsy is most consistent with a benign skin disease called \"Lichen Sclerosis\".  The treatment is a super potent cortisone cream called Clobetasol.  I have enclosed a prescription.  If you do not improve after two weeks the next step would be to see a dermatologist.      Result Name Current Result   Tissue Pathology Report   7/11/2019       Please contact my practice at [phone]  if you have any questions or concerns.     Sincerely,        Raphael Molina MD          What do your labs mean?  Below is a glossary of commonly ordered labs:  LDL   Bad Cholesterol   HDL   Good Cholesterol  AST/ALT   Liver Function   Cr/Creatinine   Kidney Function  Microalbumin   Kidney Function  BUN   Kidney Function  PSA   Prostate    TSH   Thyroid Hormone  HgbA1c   Diabetes Test   Hgb (Hemoglobin)   Red Blood Cells  "

## 2022-03-02 NOTE — LETTER
(Inserted Image. Unable to display)   February 23, 2022  JAN SHIPLEY  [address] [place] [address]        Dear JAN,    Thank you for selecting [facility] for your healthcare needs.    Our records indicate you are due for the following services:     Diabetic Exam ~ Please bring your glucose meter and/or your blood glucose diary to your appointment.    (FYI   Regarding office visits: In some instances, a video visit or telephone visit may be offered as an option.)    To schedule an appointment or if you have further questions, please contact your clinic at [phone].    Powered by [employer]    Sincerely,     CATALINO Dunne

## 2022-03-03 ENCOUNTER — TELEPHONE (OUTPATIENT)
Dept: FAMILY MEDICINE | Facility: CLINIC | Age: 72
End: 2022-03-03

## 2022-03-03 DIAGNOSIS — E11.9 TYPE 2 DIABETES MELLITUS WITHOUT COMPLICATION, WITHOUT LONG-TERM CURRENT USE OF INSULIN (H): Primary | ICD-10-CM

## 2022-03-03 RX ORDER — ATORVASTATIN CALCIUM 20 MG/1
20 TABLET, FILM COATED ORAL DAILY
Qty: 90 TABLET | Refills: 0 | Status: SHIPPED | OUTPATIENT
Start: 2022-03-03 | End: 2022-05-18

## 2022-03-03 NOTE — TELEPHONE ENCOUNTER
Reason for Call:  Medication or medication refill:    Do you use a [facility]?  Name of the pharmacy and phone number for the current request: [facility] [place].  Lv at [phone]    Name of the medication requested: Lipitor    Other request: This is the only refill she needs, all other meds are ok.    Can we leave a detailed message on this number? YES    Phone number patient can be reached at: Other phone number:  Lv at [phone]    Best Time: anytime    Call taken on 3/3/2022 at 10:24 AM by ALLISON DAVILA

## 2022-03-03 NOTE — TELEPHONE ENCOUNTER
Per Gail, Lipitor 20mg one tablet by mouth daily, last prescribed 08/16/2021 #90+0. Please advise on refills.

## 2022-05-09 ENCOUNTER — TELEPHONE (OUTPATIENT)
Dept: FAMILY MEDICINE | Facility: CLINIC | Age: 72
End: 2022-05-09

## 2022-05-09 DIAGNOSIS — F03.90 DEMENTIA WITHOUT BEHAVIORAL DISTURBANCE, UNSPECIFIED DEMENTIA TYPE: Primary | ICD-10-CM

## 2022-05-09 NOTE — TELEPHONE ENCOUNTER
Reason for Call: Request for an order or referral:    Order or referral being requested: MRI Brain for dementia/alzheimers    Date needed: as soon as possible    Has the patient been seen by the PCP for this problem? YES    Additional comments: Did not do the scan last year that [facility] referred for.     Phone number Patient can be reached at:  [phone]    Best Time:  anytime    Can we leave a detailed message on this number?  YES    Call taken on 5/9/2022 at 8:43 AM by Vijaya Deng

## 2022-05-09 NOTE — TELEPHONE ENCOUNTER
Are you alright with just ordering this or do you need her to schedule an appt to further discuss?

## 2022-05-10 NOTE — TELEPHONE ENCOUNTER
1000 Spoke with son, Lv and let him know that Ligia Mann will place order for the MRI. He would like to have this faxed to [facility] and have them contact him to schedule. I made a note on order. He says that [facility] should have a signed DALI on file.    Fax [phone]. Confirmation received.

## 2022-05-17 DIAGNOSIS — I10 HTN (HYPERTENSION): Primary | ICD-10-CM

## 2022-05-17 DIAGNOSIS — E11.9 TYPE 2 DIABETES MELLITUS WITHOUT COMPLICATION, WITHOUT LONG-TERM CURRENT USE OF INSULIN (H): ICD-10-CM

## 2022-05-18 RX ORDER — LOSARTAN POTASSIUM 100 MG/1
TABLET ORAL
Qty: 60 TABLET | Refills: 0 | Status: SHIPPED | OUTPATIENT
Start: 2022-05-18 | End: 2022-08-17

## 2022-05-18 RX ORDER — ATORVASTATIN CALCIUM 20 MG/1
TABLET, FILM COATED ORAL
Qty: 60 TABLET | Refills: 0 | Status: SHIPPED | OUTPATIENT
Start: 2022-05-18 | End: 2022-08-17

## 2022-05-18 NOTE — TELEPHONE ENCOUNTER
TC- please contact patient. 30 day supply of medication sent to pharmacy. Patient will need to schedule appointment for refills.(due for fasting labs: A1C/ Lipids).        Metformin:  8/16/21  #180  R-1    Losartan:  8/16/21    #90  R-1    Atorvastatin:  3/3/22      #90  R-0      Last visit: 8/16/21  Next visit: none    A1C: 8/30/21

## 2022-05-29 ENCOUNTER — HEALTH MAINTENANCE LETTER (OUTPATIENT)
Age: 72
End: 2022-05-29

## 2022-05-30 ENCOUNTER — MYC MEDICAL ADVICE (OUTPATIENT)
Dept: FAMILY MEDICINE | Facility: CLINIC | Age: 72
End: 2022-05-30

## 2022-05-30 DIAGNOSIS — F41.8 SITUATIONAL ANXIETY: Primary | ICD-10-CM

## 2022-06-01 RX ORDER — LORAZEPAM 0.5 MG/1
TABLET ORAL
Qty: 2 TABLET | Refills: 0 | Status: SHIPPED | OUTPATIENT
Start: 2022-06-01 | End: 2022-07-08

## 2022-06-09 NOTE — TELEPHONE ENCOUNTER
Dr. Pastor - would you please address the [employer] message from patients son Lv.    Son is wondering about dementia diagnosis and next steps, also medication question.

## 2022-06-16 NOTE — PROCEDURES
Accession Number:       [id number]  PATHOLOGIST:     Efrem Alex M.D., Board Certified in Anatomic Pathology and Clinical Pathology 1  (electronic signature)  REPORT NOTES:     Key portions of this case have been reviewed by a Board Certified Dermatopathologist.  A SOURCE:     Skin, right shin, punch biopsy  A GROSS DESCRIPTION:     See comment       Specimen is received in formalin, labeled with       multiple patient identifier(s) and consists of       one piece from a punch skin biopsy measuring 0.4       x 0.4 x 0.2 cm, circular in shape and tan-gray in       color. The margins are inked green. The specimen       is bisected and entirely submitted in one       cassette(s).         Gross exam(s) performed at: [address] [address]         : SAMI TORRES MD  A DIAGNOSIS:     Lichenoid and interface dermatitis, with focal extravasation.  A COMMENT:     Diagnostic possibilities could include pigmented purpuric dermatitis, lichenoid drug eruption, and lichen sclerosus. Clinical correlation/follow-up is recommended.

## 2022-07-08 ENCOUNTER — OFFICE VISIT (OUTPATIENT)
Dept: FAMILY MEDICINE | Facility: CLINIC | Age: 72
End: 2022-07-08
Payer: MEDICARE

## 2022-07-08 VITALS
DIASTOLIC BLOOD PRESSURE: 68 MMHG | BODY MASS INDEX: 42.3 KG/M2 | SYSTOLIC BLOOD PRESSURE: 122 MMHG | HEART RATE: 74 BPM | WEIGHT: 293 LBS | TEMPERATURE: 99.4 F

## 2022-07-08 DIAGNOSIS — E78.5 HYPERLIPIDEMIA WITH TARGET LOW DENSITY LIPOPROTEIN (LDL) CHOLESTEROL LESS THAN 100 MG/DL: ICD-10-CM

## 2022-07-08 DIAGNOSIS — G89.29 CHRONIC HIP PAIN, LEFT: ICD-10-CM

## 2022-07-08 DIAGNOSIS — E11.9 DIABETES MELLITUS TREATED WITH ORAL MEDICATION (H): ICD-10-CM

## 2022-07-08 DIAGNOSIS — E55.9 VITAMIN D DEFICIENCY: ICD-10-CM

## 2022-07-08 DIAGNOSIS — M25.552 CHRONIC HIP PAIN, LEFT: ICD-10-CM

## 2022-07-08 DIAGNOSIS — F03.90 DEMENTIA WITHOUT BEHAVIORAL DISTURBANCE, UNSPECIFIED DEMENTIA TYPE: Primary | ICD-10-CM

## 2022-07-08 DIAGNOSIS — F33.42 RECURRENT MAJOR DEPRESSION IN COMPLETE REMISSION (H): ICD-10-CM

## 2022-07-08 DIAGNOSIS — I10 PRIMARY HYPERTENSION: ICD-10-CM

## 2022-07-08 DIAGNOSIS — Z79.84 DIABETES MELLITUS TREATED WITH ORAL MEDICATION (H): ICD-10-CM

## 2022-07-08 PROBLEM — E78.00 PURE HYPERCHOLESTEROLEMIA: Status: ACTIVE | Noted: 2022-07-08

## 2022-07-08 PROBLEM — M79.604 LEG PAIN, BILATERAL: Status: ACTIVE | Noted: 2017-06-27

## 2022-07-08 PROBLEM — E66.01 MORBID OBESITY (H): Status: ACTIVE | Noted: 2022-07-08

## 2022-07-08 PROBLEM — T74.91XA ADULT VICTIM OF ABUSE: Status: ACTIVE | Noted: 2022-07-08

## 2022-07-08 PROBLEM — M79.605 LEG PAIN, BILATERAL: Status: ACTIVE | Noted: 2017-06-27

## 2022-07-08 PROBLEM — E11.40 DIABETIC NEUROPATHY (H): Status: ACTIVE | Noted: 2022-07-08

## 2022-07-08 PROBLEM — M19.90 IDIOPATHIC OSTEOARTHRITIS: Status: ACTIVE | Noted: 2022-07-08

## 2022-07-08 LAB
ALBUMIN SERPL BCG-MCNC: 4.3 G/DL (ref 3.5–5.2)
ALP SERPL-CCNC: 108 U/L (ref 35–104)
ALT SERPL W P-5'-P-CCNC: 16 U/L (ref 10–35)
ANION GAP SERPL CALCULATED.3IONS-SCNC: 13 MMOL/L (ref 7–15)
AST SERPL W P-5'-P-CCNC: 16 U/L (ref 10–35)
BILIRUB SERPL-MCNC: 0.6 MG/DL
BUN SERPL-MCNC: 13.6 MG/DL (ref 8–23)
CALCIUM SERPL-MCNC: 9.3 MG/DL (ref 8.8–10.2)
CHLORIDE SERPL-SCNC: 101 MMOL/L (ref 98–107)
CREAT SERPL-MCNC: 0.69 MG/DL (ref 0.51–0.95)
DEPRECATED HCO3 PLAS-SCNC: 26 MMOL/L (ref 22–29)
ERYTHROCYTE [DISTWIDTH] IN BLOOD BY AUTOMATED COUNT: 11.6 % (ref 10–15)
GFR SERPL CREATININE-BSD FRML MDRD: >90 ML/MIN/1.73M2
GLUCOSE SERPL-MCNC: 149 MG/DL (ref 70–99)
HBA1C MFR BLD: 6.3 % (ref 0–5.6)
HCT VFR BLD AUTO: 44.1 % (ref 35–47)
HGB BLD-MCNC: 14.5 G/DL (ref 11.7–15.7)
MAGNESIUM SERPL-MCNC: 1.8 MG/DL (ref 1.7–2.3)
MCH RBC QN AUTO: 30.8 PG (ref 26.5–33)
MCHC RBC AUTO-ENTMCNC: 32.9 G/DL (ref 31.5–36.5)
MCV RBC AUTO: 94 FL (ref 78–100)
PLATELET # BLD AUTO: 267 10E3/UL (ref 150–450)
POTASSIUM SERPL-SCNC: 4.6 MMOL/L (ref 3.4–5.3)
PROT SERPL-MCNC: 6.9 G/DL (ref 6.4–8.3)
RBC # BLD AUTO: 4.71 10E6/UL (ref 3.8–5.2)
SODIUM SERPL-SCNC: 140 MMOL/L (ref 136–145)
VIT B12 SERPL-MCNC: <150 PG/ML (ref 232–1245)
WBC # BLD AUTO: 8.4 10E3/UL (ref 4–11)

## 2022-07-08 PROCEDURE — 82306 VITAMIN D 25 HYDROXY: CPT | Performed by: INTERNAL MEDICINE

## 2022-07-08 PROCEDURE — 83735 ASSAY OF MAGNESIUM: CPT | Performed by: INTERNAL MEDICINE

## 2022-07-08 PROCEDURE — 82607 VITAMIN B-12: CPT | Performed by: INTERNAL MEDICINE

## 2022-07-08 PROCEDURE — 99214 OFFICE O/P EST MOD 30 MIN: CPT | Performed by: INTERNAL MEDICINE

## 2022-07-08 PROCEDURE — 36415 COLL VENOUS BLD VENIPUNCTURE: CPT | Performed by: INTERNAL MEDICINE

## 2022-07-08 PROCEDURE — 83036 HEMOGLOBIN GLYCOSYLATED A1C: CPT | Performed by: INTERNAL MEDICINE

## 2022-07-08 PROCEDURE — 85027 COMPLETE CBC AUTOMATED: CPT | Performed by: INTERNAL MEDICINE

## 2022-07-08 PROCEDURE — 80053 COMPREHEN METABOLIC PANEL: CPT | Performed by: INTERNAL MEDICINE

## 2022-07-08 RX ORDER — RISPERIDONE 1 MG/1
1 TABLET ORAL DAILY
COMMUNITY
Start: 2021-08-16 | End: 2022-08-17

## 2022-07-08 RX ORDER — CELECOXIB 200 MG/1
200 CAPSULE ORAL
COMMUNITY
Start: 2021-08-16 | End: 2022-08-17

## 2022-07-08 RX ORDER — LOSARTAN POTASSIUM 100 MG/1
TABLET ORAL
COMMUNITY
Start: 2021-08-16 | End: 2022-07-08

## 2022-07-08 RX ORDER — ATORVASTATIN CALCIUM 20 MG/1
TABLET, FILM COATED ORAL
COMMUNITY
Start: 2021-08-16 | End: 2022-07-08

## 2022-07-08 RX ORDER — CITALOPRAM HYDROBROMIDE 40 MG/1
TABLET ORAL
COMMUNITY
Start: 2021-08-16 | End: 2022-08-17

## 2022-07-08 RX ORDER — GABAPENTIN 100 MG/1
200 CAPSULE ORAL
COMMUNITY
Start: 2021-08-16 | End: 2022-08-17

## 2022-07-08 ASSESSMENT — ENCOUNTER SYMPTOMS
FATIGUE: 1
HEADACHES: 0
FEVER: 0
MYALGIAS: 0
NERVOUS/ANXIOUS: 0
HALLUCINATIONS: 0
SPEECH DIFFICULTY: 0
ABDOMINAL PAIN: 0
SHORTNESS OF BREATH: 0
WEAKNESS: 0
UNEXPECTED WEIGHT CHANGE: 0
SLEEP DISTURBANCE: 0

## 2022-07-08 NOTE — PROGRESS NOTES
"  Assessment & Plan     Dementia without behavioral disturbance, unspecified dementia type (H)  Significant dementia.  Likely Alzheimer's.  We will repeat some lab work.  Discussed the importance of regular mental and physical exercise.  Discussed the importance of vascular disease risk factor control.  Offered neurology referral which was declined.  - Vitamin B12; Future  - CBC with platelets; Future  - Comprehensive metabolic panel (BMP + Alb, Alk Phos, ALT, AST, Total. Bili, TP); Future  - Vitamin B12  - CBC with platelets  - Comprehensive metabolic panel (BMP + Alb, Alk Phos, ALT, AST, Total. Bili, TP)    Diabetes mellitus treated with oral medication (H)  Stable  - Hemoglobin A1c (aka HBA1C); Future  - Hemoglobin A1c (aka HBA1C)    Recurrent major depression in complete remission (H)  Stable    Hyperlipidemia with target low density lipoprotein (LDL) cholesterol less than 100 mg/dL  Stable    Primary hypertension  Controlled  - Comprehensive metabolic panel (BMP + Alb, Alk Phos, ALT, AST, Total. Bili, TP); Future  - Magnesium; Future  - Comprehensive metabolic panel (BMP + Alb, Alk Phos, ALT, AST, Total. Bili, TP)  - Magnesium    Chronic hip pain, left  Suspect osteoarthritis given chronic groin pain with tenderness on exam.  - XR Hip Left 2-3 Views; Future  - diclofenac (VOLTAREN) 1 % topical gel; Apply 4 g topically 4 times daily as needed for other (left hip pain)    Vitamin D deficiency  Little sun exposure.  - Vitamin D Deficiency; Future  - Vitamin D Deficiency             BMI:   Estimated body mass index is 42.3 kg/m  as calculated from the following:    Height as of 8/30/21: 1.822 m (5' 11.75\").    Weight as of this encounter: 140.5 kg (309 lb 11.2 oz).           No follow-ups on file.    Jameel Faulkner MD  [facility]    Jessica Payne is a 72 year old accompanied by her sons, presenting for the following health issues:  Follow Up (MRI @ [facility] )      History of Present " Illness       Reason for visit:  MRI follow up    She eats 2-3 servings of fruits and vegetables daily.She consumes 0 sweetened beverage(s) daily.She exercises with enough effort to increase her heart rate 9 or less minutes per day.  She exercises with enough effort to increase her heart rate 3 or less days per week.   She is taking medications regularly.  Patient is here with 2 of her sons to review MRI done last month.  I saw the patient a year ago for memory issues and her slums was 8 of 30.  She has memory impairment.  Lives with the son.  No dangerous behavior noted.  He is very sedentary.  Complains of 6 months of left hip pain worse with ambulation.      Review of Systems   Constitutional: Positive for fatigue. Negative for fever and unexpected weight change.   Eyes: Negative for visual disturbance.   Respiratory: Negative for shortness of breath.    Cardiovascular: Negative for chest pain and peripheral edema.   Gastrointestinal: Negative for abdominal pain.   Musculoskeletal: Negative for myalgias.   Neurological: Negative for speech difficulty, weakness and headaches.   Psychiatric/Behavioral: Negative for hallucinations, mood changes and sleep disturbance. The patient is not nervous/anxious.             Objective    BP (!) 144/79 (BP Location: Right arm)   Pulse 74   Temp 99.4  F (37.4  C)   Wt 140.5 kg (309 lb 11.2 oz)   BMI 42.30 kg/m    Body mass index is 42.3 kg/m .  Physical Exam   Patient appears comfortable.  Alert and oriented.  Mood is normal.  Forgetful.  Speech is fluent.    MRI of the last month showed volume loss and small vessel ischemic change consistent with her history of diabetes and hypertension B12 last year was borderline low.  Lab work otherwise unremarkable.  Left hip is tender anteriorly.  Gait appears normal.              .  ..

## 2022-07-11 ENCOUNTER — MYC MEDICAL ADVICE (OUTPATIENT)
Dept: FAMILY MEDICINE | Facility: CLINIC | Age: 72
End: 2022-07-11

## 2022-07-11 DIAGNOSIS — E11.42 DIABETIC POLYNEUROPATHY ASSOCIATED WITH TYPE 2 DIABETES MELLITUS (H): Primary | ICD-10-CM

## 2022-07-11 DIAGNOSIS — E53.8 VITAMIN B12 DEFICIENCY (NON ANEMIC): ICD-10-CM

## 2022-07-11 DIAGNOSIS — E53.8 VITAMIN B12 DEFICIENCY (NON ANEMIC): Primary | ICD-10-CM

## 2022-07-11 LAB — DEPRECATED CALCIDIOL+CALCIFEROL SERPL-MC: 57 UG/L (ref 20–75)

## 2022-07-11 RX ORDER — CYANOCOBALAMIN 1000 UG/ML
1000 INJECTION, SOLUTION INTRAMUSCULAR; SUBCUTANEOUS
Status: COMPLETED | OUTPATIENT
Start: 2022-08-11 | End: 2023-08-01

## 2022-07-11 RX ORDER — CYANOCOBALAMIN 1000 UG/ML
1000 INJECTION, SOLUTION INTRAMUSCULAR; SUBCUTANEOUS WEEKLY
Status: COMPLETED | OUTPATIENT
Start: 2022-07-11 | End: 2022-08-08

## 2022-07-11 NOTE — TELEPHONE ENCOUNTER
Please see and advise on patient MyChart message.    Patients son, Lv, has questions regarding Metformin.  Lv wants to verify patient should discontinue metformin, but has question about how to regulate blood sugars.    Also questions about B12 injection and Intrinsic Factor Blocking Antibody test timing.      Rock Camargo RN  [facility]

## 2022-07-15 NOTE — TELEPHONE ENCOUNTER
See MyChart from Patient needing PCP review.  Please respond directly to patient, if at all able.    PADMINI Guerrero  [facility]

## 2022-07-18 ENCOUNTER — LAB (OUTPATIENT)
Dept: LAB | Facility: CLINIC | Age: 72
End: 2022-07-18
Payer: MEDICARE

## 2022-07-18 ENCOUNTER — ALLIED HEALTH/NURSE VISIT (OUTPATIENT)
Dept: FAMILY MEDICINE | Facility: CLINIC | Age: 72
End: 2022-07-18
Payer: MEDICARE

## 2022-07-18 DIAGNOSIS — E53.8 VITAMIN B12 DEFICIENCY (NON ANEMIC): ICD-10-CM

## 2022-07-18 DIAGNOSIS — E53.8 VITAMIN B12 DEFICIENCY (NON ANEMIC): Primary | ICD-10-CM

## 2022-07-18 PROCEDURE — 36415 COLL VENOUS BLD VENIPUNCTURE: CPT

## 2022-07-18 PROCEDURE — 86340 INTRINSIC FACTOR ANTIBODY: CPT | Mod: 90

## 2022-07-18 PROCEDURE — 99207 PR NO CHARGE NURSE ONLY: CPT

## 2022-07-18 PROCEDURE — 96372 THER/PROPH/DIAG INJ SC/IM: CPT | Performed by: INTERNAL MEDICINE

## 2022-07-18 PROCEDURE — 99000 SPECIMEN HANDLING OFFICE-LAB: CPT

## 2022-07-18 RX ADMIN — CYANOCOBALAMIN 1000 MCG: 1000 INJECTION, SOLUTION INTRAMUSCULAR; SUBCUTANEOUS at 07:59

## 2022-07-20 LAB — IF BLOCK AB SER QL RIA: NEGATIVE

## 2022-07-25 ENCOUNTER — ALLIED HEALTH/NURSE VISIT (OUTPATIENT)
Dept: FAMILY MEDICINE | Facility: CLINIC | Age: 72
End: 2022-07-25
Payer: MEDICARE

## 2022-07-25 DIAGNOSIS — E53.8 VITAMIN B12 DEFICIENCY (NON ANEMIC): Primary | ICD-10-CM

## 2022-07-25 PROCEDURE — 96372 THER/PROPH/DIAG INJ SC/IM: CPT | Performed by: INTERNAL MEDICINE

## 2022-07-25 PROCEDURE — 99207 PR NO CHARGE NURSE ONLY: CPT

## 2022-07-25 RX ADMIN — CYANOCOBALAMIN 1000 MCG: 1000 INJECTION, SOLUTION INTRAMUSCULAR; SUBCUTANEOUS at 07:46

## 2022-08-01 ENCOUNTER — ALLIED HEALTH/NURSE VISIT (OUTPATIENT)
Dept: FAMILY MEDICINE | Facility: CLINIC | Age: 72
End: 2022-08-01
Payer: MEDICARE

## 2022-08-01 DIAGNOSIS — E53.8 VITAMIN B12 DEFICIENCY (NON ANEMIC): Primary | ICD-10-CM

## 2022-08-01 PROCEDURE — 96372 THER/PROPH/DIAG INJ SC/IM: CPT | Performed by: INTERNAL MEDICINE

## 2022-08-01 PROCEDURE — 99207 PR NO CHARGE NURSE ONLY: CPT

## 2022-08-01 RX ADMIN — CYANOCOBALAMIN 1000 MCG: 1000 INJECTION, SOLUTION INTRAMUSCULAR; SUBCUTANEOUS at 09:23

## 2022-08-08 ENCOUNTER — ALLIED HEALTH/NURSE VISIT (OUTPATIENT)
Dept: FAMILY MEDICINE | Facility: CLINIC | Age: 72
End: 2022-08-08
Payer: MEDICARE

## 2022-08-08 DIAGNOSIS — E53.8 VITAMIN B12 DEFICIENCY (NON ANEMIC): Primary | ICD-10-CM

## 2022-08-08 PROCEDURE — 99207 PR NO CHARGE NURSE ONLY: CPT

## 2022-08-08 PROCEDURE — 96372 THER/PROPH/DIAG INJ SC/IM: CPT | Performed by: INTERNAL MEDICINE

## 2022-08-08 RX ADMIN — CYANOCOBALAMIN 1000 MCG: 1000 INJECTION, SOLUTION INTRAMUSCULAR; SUBCUTANEOUS at 07:46

## 2022-08-16 DIAGNOSIS — F29 PSYCHOSIS (H): ICD-10-CM

## 2022-08-16 DIAGNOSIS — G89.29 CHRONIC HIP PAIN: Primary | ICD-10-CM

## 2022-08-16 DIAGNOSIS — E11.40 DIABETIC NEUROPATHY (H): ICD-10-CM

## 2022-08-16 DIAGNOSIS — F33.42 RECURRENT MAJOR DEPRESSION IN COMPLETE REMISSION (H): ICD-10-CM

## 2022-08-16 DIAGNOSIS — M25.559 CHRONIC HIP PAIN: Primary | ICD-10-CM

## 2022-08-16 DIAGNOSIS — I10 HTN (HYPERTENSION): ICD-10-CM

## 2022-08-16 DIAGNOSIS — E11.9 TYPE 2 DIABETES MELLITUS WITHOUT COMPLICATION, WITHOUT LONG-TERM CURRENT USE OF INSULIN (H): ICD-10-CM

## 2022-08-17 RX ORDER — GABAPENTIN 100 MG/1
CAPSULE ORAL
Qty: 360 CAPSULE | Refills: 1 | Status: SHIPPED | OUTPATIENT
Start: 2022-08-17 | End: 2023-01-06

## 2022-08-17 RX ORDER — CITALOPRAM HYDROBROMIDE 40 MG/1
TABLET ORAL
Qty: 90 TABLET | Refills: 1 | Status: SHIPPED | OUTPATIENT
Start: 2022-08-17 | End: 2023-01-06

## 2022-08-17 RX ORDER — ATORVASTATIN CALCIUM 20 MG/1
TABLET, FILM COATED ORAL
Qty: 60 TABLET | Refills: 1 | Status: SHIPPED | OUTPATIENT
Start: 2022-08-17 | End: 2023-01-06

## 2022-08-17 RX ORDER — RISPERIDONE 1 MG/1
TABLET ORAL
Qty: 135 TABLET | Refills: 1 | Status: SHIPPED | OUTPATIENT
Start: 2022-08-17 | End: 2023-01-06

## 2022-08-17 RX ORDER — CELECOXIB 200 MG/1
CAPSULE ORAL
Qty: 90 CAPSULE | Refills: 2 | Status: SHIPPED | OUTPATIENT
Start: 2022-08-17 | End: 2023-01-06

## 2022-08-17 RX ORDER — LOSARTAN POTASSIUM 100 MG/1
TABLET ORAL
Qty: 60 TABLET | Refills: 1 | Status: SHIPPED | OUTPATIENT
Start: 2022-08-17 | End: 2023-01-06

## 2022-08-17 NOTE — TELEPHONE ENCOUNTER
Received call from [email or web address]. Patient son calling to followup on Med refill.      Last office visit: 7/8/2022    Future Office Visit:   Next 5 appointments (look out 90 days)    Sep 06, 2022  8:00 AM  MA Visit with ADINA CLAIRE/LPN  [facility] ([facility] [address] [facility] [id number] [phone]

## 2022-08-18 NOTE — TELEPHONE ENCOUNTER
Test result means that she does not have an autoimmune condition causing low B-12. Metformin blocks the adsorption of B-12 in some people. Consultation due to diagnosis of dementia.

## 2022-09-06 ENCOUNTER — ALLIED HEALTH/NURSE VISIT (OUTPATIENT)
Dept: FAMILY MEDICINE | Facility: CLINIC | Age: 72
End: 2022-09-06
Payer: MEDICARE

## 2022-09-06 DIAGNOSIS — E53.8 VITAMIN B12 DEFICIENCY (NON ANEMIC): Primary | ICD-10-CM

## 2022-09-06 PROCEDURE — 99207 PR NO CHARGE NURSE ONLY: CPT

## 2022-09-06 PROCEDURE — 96372 THER/PROPH/DIAG INJ SC/IM: CPT | Performed by: INTERNAL MEDICINE

## 2022-09-06 RX ADMIN — CYANOCOBALAMIN 1000 MCG: 1000 INJECTION, SOLUTION INTRAMUSCULAR; SUBCUTANEOUS at 07:46

## 2022-09-30 ENCOUNTER — TRANSFERRED RECORDS (OUTPATIENT)
Dept: HEALTH INFORMATION MANAGEMENT | Facility: CLINIC | Age: 72
End: 2022-09-30

## 2022-10-03 ENCOUNTER — HEALTH MAINTENANCE LETTER (OUTPATIENT)
Age: 72
End: 2022-10-03

## 2022-10-04 ENCOUNTER — ALLIED HEALTH/NURSE VISIT (OUTPATIENT)
Dept: FAMILY MEDICINE | Facility: CLINIC | Age: 72
End: 2022-10-04
Payer: MEDICARE

## 2022-10-04 DIAGNOSIS — E53.8 VITAMIN B12 DEFICIENCY (NON ANEMIC): Primary | ICD-10-CM

## 2022-10-04 PROCEDURE — 96372 THER/PROPH/DIAG INJ SC/IM: CPT | Performed by: INTERNAL MEDICINE

## 2022-10-04 PROCEDURE — 99207 PR NO CHARGE NURSE ONLY: CPT

## 2022-10-04 RX ADMIN — CYANOCOBALAMIN 1000 MCG: 1000 INJECTION, SOLUTION INTRAMUSCULAR; SUBCUTANEOUS at 07:51

## 2022-11-01 ENCOUNTER — ALLIED HEALTH/NURSE VISIT (OUTPATIENT)
Dept: FAMILY MEDICINE | Facility: CLINIC | Age: 72
End: 2022-11-01
Payer: MEDICARE

## 2022-11-01 DIAGNOSIS — E53.8 VITAMIN B12 DEFICIENCY (NON ANEMIC): Primary | ICD-10-CM

## 2022-11-01 PROCEDURE — 99207 PR NO CHARGE NURSE ONLY: CPT

## 2022-11-01 PROCEDURE — 96372 THER/PROPH/DIAG INJ SC/IM: CPT | Performed by: INTERNAL MEDICINE

## 2022-11-01 RX ADMIN — CYANOCOBALAMIN 1000 MCG: 1000 INJECTION, SOLUTION INTRAMUSCULAR; SUBCUTANEOUS at 07:49

## 2022-12-06 ENCOUNTER — ALLIED HEALTH/NURSE VISIT (OUTPATIENT)
Dept: FAMILY MEDICINE | Facility: CLINIC | Age: 72
End: 2022-12-06
Payer: MEDICARE

## 2022-12-06 DIAGNOSIS — E53.8 VITAMIN B12 DEFICIENCY (NON ANEMIC): Primary | ICD-10-CM

## 2022-12-06 PROCEDURE — 99207 PR NO CHARGE NURSE ONLY: CPT

## 2022-12-06 PROCEDURE — 96372 THER/PROPH/DIAG INJ SC/IM: CPT | Performed by: INTERNAL MEDICINE

## 2022-12-06 RX ADMIN — CYANOCOBALAMIN 1000 MCG: 1000 INJECTION, SOLUTION INTRAMUSCULAR; SUBCUTANEOUS at 08:35

## 2022-12-14 ENCOUNTER — TRANSFERRED RECORDS (OUTPATIENT)
Dept: HEALTH INFORMATION MANAGEMENT | Facility: CLINIC | Age: 72
End: 2022-12-14

## 2022-12-30 ENCOUNTER — TELEPHONE (OUTPATIENT)
Dept: FAMILY MEDICINE | Facility: CLINIC | Age: 72
End: 2022-12-30

## 2022-12-30 NOTE — TELEPHONE ENCOUNTER
Occupational Therapy, from [employer] calling to seek VORB:    OT x1/week for 4 weeks  Working on: ADL training    VORB given.    Caller will fax over paper work for completion.    PADMINI Montanez

## 2023-01-02 DIAGNOSIS — F33.42 RECURRENT MAJOR DEPRESSION IN COMPLETE REMISSION (H): ICD-10-CM

## 2023-01-02 RX ORDER — CITALOPRAM HYDROBROMIDE 40 MG/1
TABLET ORAL
Qty: 90 TABLET | Refills: 0 | OUTPATIENT
Start: 2023-01-02

## 2023-01-06 ENCOUNTER — OFFICE VISIT (OUTPATIENT)
Dept: FAMILY MEDICINE | Facility: CLINIC | Age: 73
End: 2023-01-06
Payer: MEDICARE

## 2023-01-06 ENCOUNTER — ALLIED HEALTH/NURSE VISIT (OUTPATIENT)
Dept: FAMILY MEDICINE | Facility: CLINIC | Age: 73
End: 2023-01-06
Payer: MEDICARE

## 2023-01-06 VITALS
SYSTOLIC BLOOD PRESSURE: 110 MMHG | WEIGHT: 293 LBS | OXYGEN SATURATION: 95 % | BODY MASS INDEX: 41.52 KG/M2 | HEART RATE: 74 BPM | TEMPERATURE: 98.6 F | DIASTOLIC BLOOD PRESSURE: 64 MMHG

## 2023-01-06 DIAGNOSIS — E53.8 VITAMIN B12 DEFICIENCY (NON ANEMIC): ICD-10-CM

## 2023-01-06 DIAGNOSIS — E11.9 TYPE 2 DIABETES MELLITUS WITHOUT COMPLICATION, WITHOUT LONG-TERM CURRENT USE OF INSULIN (H): ICD-10-CM

## 2023-01-06 DIAGNOSIS — F33.42 RECURRENT MAJOR DEPRESSION IN COMPLETE REMISSION (H): ICD-10-CM
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
## 2023-01-06 DIAGNOSIS — E53.8 VITAMIN B12 DEFICIENCY (NON ANEMIC): Primary | ICD-10-CM

## 2023-01-06 DIAGNOSIS — Z78.0 POSTMENOPAUSAL STATUS: ICD-10-CM

## 2023-01-06 DIAGNOSIS — I10 PRIMARY HYPERTENSION: ICD-10-CM

## 2023-01-06 DIAGNOSIS — Z12.31 VISIT FOR SCREENING MAMMOGRAM: ICD-10-CM

## 2023-01-06 DIAGNOSIS — M19.90 IDIOPATHIC OSTEOARTHRITIS: ICD-10-CM

## 2023-01-06 DIAGNOSIS — E66.01 MORBID OBESITY (H): ICD-10-CM

## 2023-01-06 DIAGNOSIS — E11.42 TYPE 2 DIABETES MELLITUS WITH DIABETIC POLYNEUROPATHY, WITHOUT LONG-TERM CURRENT USE OF INSULIN (H): ICD-10-CM

## 2023-01-06 DIAGNOSIS — F03.90 DEMENTIA WITHOUT BEHAVIORAL DISTURBANCE (H): ICD-10-CM
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
## 2023-01-06 DIAGNOSIS — Z11.59 NEED FOR HEPATITIS C SCREENING TEST: ICD-10-CM

## 2023-01-06 DIAGNOSIS — E78.5 HYPERLIPIDEMIA WITH TARGET LOW DENSITY LIPOPROTEIN (LDL) CHOLESTEROL LESS THAN 100 MG/DL: ICD-10-CM

## 2023-01-06 DIAGNOSIS — E11.42 DIABETIC POLYNEUROPATHY ASSOCIATED WITH TYPE 2 DIABETES MELLITUS (H): ICD-10-CM

## 2023-01-06 DIAGNOSIS — E55.9 VITAMIN D DEFICIENCY: ICD-10-CM

## 2023-01-06 DIAGNOSIS — L60.3 NAIL DYSTROPHY: ICD-10-CM

## 2023-01-06 PROBLEM — E78.00 PURE HYPERCHOLESTEROLEMIA: Status: RESOLVED | Noted: 2022-07-08 | Resolved: 2023-01-06

## 2023-01-06 PROBLEM — Z79.84 DIABETES MELLITUS TREATED WITH ORAL MEDICATION (H): Status: RESOLVED | Noted: 2022-07-08 | Resolved: 2023-01-06

## 2023-01-06 LAB
HBA1C MFR BLD: 6.8 % (ref 0–5.6)
HCV AB SERPL QL IA: NONREACTIVE
VIT B12 SERPL-MCNC: 237 PG/ML (ref 232–1245)

## 2023-01-06 PROCEDURE — 82607 VITAMIN B-12: CPT | Performed by: INTERNAL MEDICINE

## 2023-01-06 PROCEDURE — 83036 HEMOGLOBIN GLYCOSYLATED A1C: CPT | Performed by: INTERNAL MEDICINE

## 2023-01-06 PROCEDURE — 99207 PR FOOT EXAM NO CHARGE: CPT | Mod: 25 | Performed by: INTERNAL MEDICINE

## 2023-01-06 PROCEDURE — 99207 PR NO CHARGE NURSE ONLY: CPT

## 2023-01-06 PROCEDURE — 36415 COLL VENOUS BLD VENIPUNCTURE: CPT | Performed by: INTERNAL MEDICINE

## 2023-01-06 PROCEDURE — 86803 HEPATITIS C AB TEST: CPT | Performed by: INTERNAL MEDICINE

## 2023-01-06 PROCEDURE — 96372 THER/PROPH/DIAG INJ SC/IM: CPT | Performed by: INTERNAL MEDICINE

## 2023-01-06 PROCEDURE — 99214 OFFICE O/P EST MOD 30 MIN: CPT | Mod: 25 | Performed by: INTERNAL MEDICINE

## 2023-01-06 RX ORDER — LOSARTAN POTASSIUM 100 MG/1
100 TABLET ORAL DAILY
Qty: 90 TABLET | Refills: 3 | Status: SHIPPED | OUTPATIENT
Start: 2023-01-06 | End: 2024-02-06

## 2023-01-06 RX ORDER — GABAPENTIN 100 MG/1
200 CAPSULE ORAL 2 TIMES DAILY
Qty: 360 CAPSULE | Refills: 3 | Status: SHIPPED | OUTPATIENT
Start: 2023-01-06 | End: 2024-01-10

## 2023-01-06 RX ORDER — ATORVASTATIN CALCIUM 20 MG/1
20 TABLET, FILM COATED ORAL DAILY
Qty: 90 TABLET | Refills: 3 | Status: SHIPPED | OUTPATIENT
Start: 2023-01-06 | End: 2024-02-06

## 2023-01-06 RX ORDER — CELECOXIB 200 MG/1
200 CAPSULE ORAL DAILY
Qty: 90 CAPSULE | Refills: 3 | Status: SHIPPED | OUTPATIENT
Start: 2023-01-06 | End: 2024-02-06

## 2023-01-06 RX ORDER — RISPERIDONE 1 MG/1
1.5 TABLET ORAL DAILY
Qty: 135 TABLET | Refills: 3 | Status: SHIPPED | OUTPATIENT
Start: 2023-01-06 | End: 2024-02-06

## 2023-01-06 RX ORDER — CITALOPRAM HYDROBROMIDE 20 MG/1
20 TABLET ORAL DAILY
Qty: 90 TABLET | Refills: 3 | Status: SHIPPED | OUTPATIENT
Start: 2023-01-06 | End: 2023-10-18

## 2023-01-06 RX ADMIN — CYANOCOBALAMIN 1000 MCG: 1000 INJECTION, SOLUTION INTRAMUSCULAR; SUBCUTANEOUS at 09:08

## 2023-01-06 ASSESSMENT — PATIENT HEALTH QUESTIONNAIRE - PHQ9
SUM OF ALL RESPONSES TO PHQ QUESTIONS 1-9: 4
10. IF YOU CHECKED OFF ANY PROBLEMS, HOW DIFFICULT HAVE THESE PROBLEMS MADE IT FOR YOU TO DO YOUR WORK, TAKE CARE OF THINGS AT HOME, OR GET ALONG WITH OTHER PEOPLE: SOMEWHAT DIFFICULT
SUM OF ALL RESPONSES TO PHQ QUESTIONS 1-9: 4

## 2023-01-06 NOTE — PROGRESS NOTES
Assessment & Plan     Vitamin D deficiency  Unchanged    Need for hepatitis C screening test  Low risk  - Hepatitis C Screen Reflex to HCV RNA Quant and Genotype; Future    Visit for screening mammogram    - MA SCREENING DIGITAL BILAT - Future  (s+30); Future    Dementia without behavioral disturbance (H)  Doing well living with her brother 24-hour care.    Diabetic polyneuropathy associated with type 2 diabetes mellitus (H)  Controlled  - Adult Eye  Referral; Future  - FOOT EXAM  - Hemoglobin A1c; Future    Morbid obesity (H)  Discussed weight loss    Fall at home, initial encounter  Patient has had a good recovery.  Had physical therapy.    Recurrent major depression in complete remission (H)  Stable.  Citalopram decreased during hospital admission due to prolonged QT  - citalopram (CELEXA) 20 MG tablet; Take 1 tablet (20 mg) by mouth daily for 90 days  - risperiDONE (RISPERDAL) 1 MG tablet; Take 1.5 tablets (1.5 mg) by mouth daily for 90 days    Idiopathic osteoarthritis  Unchanged.  - celecoxib (CELEBREX) 200 MG capsule; Take 1 capsule (200 mg) by mouth daily for 90 days    Hyperlipidemia with target low density lipoprotein (LDL) cholesterol less than 100 mg/dL  On statin.    Primary hypertension  Controlled.  - losartan (COZAAR) 100 MG tablet; Take 1 tablet (100 mg) by mouth daily for 90 days    Type 2 diabetes mellitus with diabetic polyneuropathy, without long-term current use of insulin (H)  Controlled.  No hypoglycemia.  - gabapentin (NEURONTIN) 100 MG capsule; Take 2 capsules (200 mg) by mouth 2 times daily for 90 days    Type 2 diabetes mellitus without complication, without long-term current use of insulin (H)  Controlled.  No hypoglycemia.  - atorvastatin (LIPITOR) 20 MG tablet; Take 1 tablet (20 mg) by mouth daily for 90 days  - metFORMIN (GLUCOPHAGE) 500 MG tablet; Take 1 tablet (500 mg) by mouth 2 times daily (with meals) for 90 days    Postmenopausal status  Has not had bone density.  -  DEXA HIP/PELVIS/SPINE - Future; Future    Nail dystrophy  All 10 nails are trimmed.             MED REC REQUIRED  Post Medication Reconciliation Status: discharge medications reconciled and changed, per note/orders      Return in about 6 months (around 7/6/2023) for Routine preventive.    Jameel Faulkner MD  [facility] [place]    Subjective   Kerry is a 72 year old accompanied by her spouse, presenting for the following health issues:  Hospital F/U ([facility] )      HPI   Patient fell and was admitted to the hospital due to knee pain.  She was walking back to her bedroom in the dark and tripped hitting her knees.  No loss of consciousness.  She was admitted to the hospital and underwent physical therapy and evaluation.  Is doing well.  No history of falls.  Living with her brother.  Her son Lv is her decision-maker.  Things are currently going well.  No hypoglycemia or psychotic symptoms.      Review of Systems   No fever, chills, headache, myalgia, hypoglycemia, chest pain, dyspnea.  Sleeping well.      Objective    /64 (BP Location: Right arm)   Pulse 74   Temp 98.6  F (37  C)   Wt 137.9 kg (304 lb)   SpO2 95%   BMI 41.52 kg/m    Body mass index is 41.52 kg/m .  Physical Exam   Patient is a morbidly obese woman in no distress.  Alert and oriented.  Chest clear.  Card exam regular.  Feet are in good condition.  Sensation to monofilament intact but vibration absent bilaterally.  Pedal pulses intact bilaterally.  Dystrophic nails all 20 oh trimmed.    BMP and CBC in the hospital unremarkable.                Answers for HPI/ROS submitted by the patient on 1/6/2023  If you checked off any problems, how difficult have these problems made it for you to do your work, take care of things at home, or get along with other people?: Somewhat difficult  PHQ9 TOTAL SCORE: 4

## 2023-01-16 ENCOUNTER — MEDICAL CORRESPONDENCE (OUTPATIENT)
Dept: HEALTH INFORMATION MANAGEMENT | Facility: CLINIC | Age: 73
End: 2023-01-16

## 2023-01-23 ENCOUNTER — MEDICAL CORRESPONDENCE (OUTPATIENT)
Dept: HEALTH INFORMATION MANAGEMENT | Facility: CLINIC | Age: 73
End: 2023-01-23

## 2023-01-31 ENCOUNTER — MEDICAL CORRESPONDENCE (OUTPATIENT)
Dept: HEALTH INFORMATION MANAGEMENT | Facility: CLINIC | Age: 73
End: 2023-01-31

## 2023-01-31 DIAGNOSIS — F33.42 RECURRENT MAJOR DEPRESSION IN COMPLETE REMISSION (H): ICD-10-CM

## 2023-01-31 DIAGNOSIS — F03.B3 MODERATE DEMENTIA WITH MOOD DISTURBANCE, UNSPECIFIED DEMENTIA TYPE (H): Primary | ICD-10-CM

## 2023-02-07 ENCOUNTER — ALLIED HEALTH/NURSE VISIT (OUTPATIENT)
Dept: FAMILY MEDICINE | Facility: CLINIC | Age: 73
End: 2023-02-07
Payer: MEDICARE

## 2023-02-07 DIAGNOSIS — E53.8 VITAMIN B12 DEFICIENCY (NON ANEMIC): Primary | ICD-10-CM

## 2023-02-07 PROCEDURE — 99207 PR NO CHARGE NURSE ONLY: CPT

## 2023-02-07 PROCEDURE — 96372 THER/PROPH/DIAG INJ SC/IM: CPT | Performed by: INTERNAL MEDICINE

## 2023-02-07 RX ADMIN — CYANOCOBALAMIN 1000 MCG: 1000 INJECTION, SOLUTION INTRAMUSCULAR; SUBCUTANEOUS at 09:24

## 2023-02-12 ENCOUNTER — MEDICAL CORRESPONDENCE (OUTPATIENT)
Dept: HEALTH INFORMATION MANAGEMENT | Facility: CLINIC | Age: 73
End: 2023-02-12
Payer: MEDICARE

## 2023-02-25 DIAGNOSIS — Z53.9 DIAGNOSIS NOT YET DEFINED: Primary | ICD-10-CM

## 2023-02-25 PROCEDURE — G0179 MD RECERTIFICATION HHA PT: HCPCS | Performed by: NURSE PRACTITIONER

## 2023-03-07 ENCOUNTER — ALLIED HEALTH/NURSE VISIT (OUTPATIENT)
Dept: FAMILY MEDICINE | Facility: CLINIC | Age: 73
End: 2023-03-07
Payer: MEDICARE

## 2023-03-07 DIAGNOSIS — E53.8 VITAMIN B12 DEFICIENCY (NON ANEMIC): Primary | ICD-10-CM

## 2023-03-07 PROCEDURE — 99207 PR NO CHARGE NURSE ONLY: CPT

## 2023-03-07 PROCEDURE — 96372 THER/PROPH/DIAG INJ SC/IM: CPT | Performed by: INTERNAL MEDICINE

## 2023-03-07 RX ADMIN — CYANOCOBALAMIN 1000 MCG: 1000 INJECTION, SOLUTION INTRAMUSCULAR; SUBCUTANEOUS at 09:36

## 2023-03-10 ENCOUNTER — MYC MEDICAL ADVICE (OUTPATIENT)
Dept: FAMILY MEDICINE | Facility: CLINIC | Age: 73
End: 2023-03-10
Payer: MEDICARE

## 2023-04-04 ENCOUNTER — ALLIED HEALTH/NURSE VISIT (OUTPATIENT)
Dept: FAMILY MEDICINE | Facility: CLINIC | Age: 73
End: 2023-04-04
Payer: MEDICARE

## 2023-04-04 DIAGNOSIS — E53.8 VITAMIN B12 DEFICIENCY (NON ANEMIC): Primary | ICD-10-CM

## 2023-04-04 PROCEDURE — 96372 THER/PROPH/DIAG INJ SC/IM: CPT | Performed by: INTERNAL MEDICINE

## 2023-04-04 PROCEDURE — 99207 PR NO CHARGE NURSE ONLY: CPT

## 2023-04-04 RX ADMIN — CYANOCOBALAMIN 1000 MCG: 1000 INJECTION, SOLUTION INTRAMUSCULAR; SUBCUTANEOUS at 09:28

## 2023-04-10 ENCOUNTER — MYC MEDICAL ADVICE (OUTPATIENT)
Dept: FAMILY MEDICINE | Facility: CLINIC | Age: 73
End: 2023-04-10
Payer: MEDICARE

## 2023-04-10 DIAGNOSIS — F03.B18 MODERATE DEMENTIA WITH OTHER BEHAVIORAL DISTURBANCE, UNSPECIFIED DEMENTIA TYPE (H): Primary | ICD-10-CM

## 2023-04-11 DIAGNOSIS — E11.9 TYPE 2 DIABETES MELLITUS WITHOUT COMPLICATION, WITHOUT LONG-TERM CURRENT USE OF INSULIN (H): ICD-10-CM

## 2023-04-12 NOTE — TELEPHONE ENCOUNTER
Last office visit: 1/6/2023     Future Appointments 4/12/2023 - 10/9/2023      Date Visit Type Length Department Provider     5/2/2023 10:00 AM MA VISIT 20 min RVFL FP/IM/PEDS RVFL MA/LPN    Location Instructions:     [facility] is located at [address], one block north of [facility] and the [facility]. The clinic shares a building with the [email or web address]; use the clinic s parking lot and entrance on the building s south side.               6/6/2023 10:00 AM MA VISIT 20 min RVFL FP/IM/PEDS RVFL MA/LPN    Location Instructions:     [facility] is located at [address], one block north of [facility] and the [facility]. The clinic shares a building with the [email or web address]; use the clinic s parking lot and entrance on the building s south side.                    Requested Prescriptions   Pending Prescriptions Disp Refills     metFORMIN (GLUCOPHAGE) 500 MG tablet [Pharmacy Med Name: METFORMIN 500MG] 180 tablet 0     Sig: TAKE ONE (1) TABLET (500 MG) BY MOUTH TWO (2) TIMES DAILY (WITH MEALS) FOR 90 DAYS       Biguanide Agents Passed - 4/11/2023  1:34 PM        Passed - Patient is age 10 or older        Passed - Patient has documented A1c within the specified period of time.     If HgbA1C is 8 or greater, it needs to be on file within the past 3 months.  If less than 8, must be on file within the past 6 months.     Recent Labs   Lab Test 01/06/23  0904   A1C 6.8*             Passed - Patient's CR is NOT>1.4 OR Patient's EGFR is NOT<45 within past 12 mos.     Recent Labs   Lab Test 07/08/22  1341   GFRESTIMATED >90       Recent Labs   Lab Test 07/08/22  1341   CR 0.69             Passed - Patient does NOT have a diagnosis of CHF.        Passed - Medication is active on med list        Passed - Patient is not pregnant        Passed - Patient has not had a  "positive pregnancy test within the past 12 mos.         Passed - Recent (6 mo) or future (30 days) visit within the authorizing provider's specialty     Patient had office visit in the last 6 months or has a visit in the next 30 days with authorizing provider or within the authorizing provider's specialty.  See \"Patient Info\" tab in inbasket, or \"Choose Columns\" in Meds & Orders section of the refill encounter.                       "

## 2023-05-02 ENCOUNTER — ALLIED HEALTH/NURSE VISIT (OUTPATIENT)
Dept: FAMILY MEDICINE | Facility: CLINIC | Age: 73
End: 2023-05-02
Payer: MEDICARE

## 2023-05-02 DIAGNOSIS — E53.8 VITAMIN B12 DEFICIENCY (NON ANEMIC): Primary | ICD-10-CM

## 2023-05-02 PROCEDURE — 99207 PR NO CHARGE NURSE ONLY: CPT

## 2023-05-02 PROCEDURE — 96372 THER/PROPH/DIAG INJ SC/IM: CPT | Performed by: INTERNAL MEDICINE

## 2023-05-02 RX ADMIN — CYANOCOBALAMIN 1000 MCG: 1000 INJECTION, SOLUTION INTRAMUSCULAR; SUBCUTANEOUS at 09:47

## 2023-05-20 ENCOUNTER — HEALTH MAINTENANCE LETTER (OUTPATIENT)
Age: 73
End: 2023-05-20

## 2023-05-24 NOTE — TELEPHONE ENCOUNTER
RX refill request refused, should have refills on file.        Last office visit: 7/8/2022     Future Appointments 1/2/2023 - 7/1/2023      Date Visit Type Length Department Provider     1/6/2023  8:30 AM ED/HOSP FOLLOW UP 30 min RVFL FP/IM/PEDS Jameel Faulkner MD    Location Instructions:     [facility] is located at [address], one block north of [facility] and the [facility]. The clinic [facility]; use the clinic s parking lot and entrance on the building s south side.               2/7/2023 10:00 AM MA VISIT 20 min RVFL FP/IM/PEDS RVFL MA/LPN    Location Instructions:     [facility] is located at [address], one block north of [facility] and the [facility]. The clinic shares a building with the [facility] [employer] [facility]; use the clinic s parking lot and entrance on the building s south side.               3/7/2023 10:00 AM MA VISIT 20 min RVFL FP/IM/PEDS RVFL MA/LPN    Location Instructions:     [facility] is located at [address], one block north of [facility] and the [facility]. The clinic shares a building with the [facility] [employer] [facility]; use the clinic s parking lot and entrance on the building s south side.               4/4/2023 10:00 AM MA VISIT 20 min RVFL FP/IM/PEDS RVFL MA/LPN    Location Instructions:     [facility] is located at [address], one block north of [facility] and the [facility]. The clinic shares a building with the [facility] [employer] [facility]; use the clinic s parking lot and entrance on the building s south side.               5/2/2023 10:00 AM MA VISIT 20 min RVFL FP/IM/PEDS RVFL MA/LPN    Location Instructions:     [facility] is located at  "[address], one block north of [facility] and the [facility]. The clinic shares a building with the [facility]; use the clinic s parking lot and entrance on the building s south side.               6/6/2023 10:00 AM MA VISIT 20 min RVFL FP/IM/PEDS RVFL MA/LPN    Location Instructions:     [facility] is located at [address], one block north of [facility] and the [facility]. The clinic shares a building with the [facility]; use the clinic s parking lot and entrance on the building s south side.                    Requested Prescriptions   Pending Prescriptions Disp Refills     citalopram (CELEXA) 40 MG tablet [Pharmacy Med Name: CITALOPRAM 40MG] 90 tablet 0     Sig: TAKE ONE (1) TABLET ORAL DAILY       SSRIs Protocol Failed - 1/2/2023  1:45 PM        Failed - PHQ-9 score less than 5 in past 6 months     Please review last PHQ-9 score.           Passed - Medication is active on med list        Passed - Patient is age 18 or older        Passed - No active pregnancy on record        Passed - No positive pregnancy test in last 12 months        Passed - Recent (6 mo) or future (30 days) visit within the authorizing provider's specialty     Patient had office visit in the last 6 months or has a visit in the next 30 days with authorizing provider or within the authorizing provider's specialty.  See \"Patient Info\" tab in inbasket, or \"Choose Columns\" in Meds & Orders section of the refill encounter.                       " Prep Text (Optional): Prior to removal the treatment areas were prepped in the usual fashion. Detail Level: Zone Acne Type: Comedonal Lesions Consent was obtained and risks were reviewed including but not limited to scarring, infection, bleeding, scabbing, incomplete removal, and allergy to anesthesia. Render Post-Care Instructions In Note?: no Extraction Method: 11 blade and comedo extractor Post-Care Instructions: I reviewed with the patient in detail post-care instructions. Patient is to keep the treatment areas dry overnight, and then apply bacitracin twice daily until healed. Patient may apply hydrogen peroxide soaks to remove any crusting. Hemostasis: Aluminum Chloride

## 2023-06-04 ENCOUNTER — HEALTH MAINTENANCE LETTER (OUTPATIENT)
Age: 73
End: 2023-06-04

## 2023-06-06 ENCOUNTER — ALLIED HEALTH/NURSE VISIT (OUTPATIENT)
Dept: FAMILY MEDICINE | Facility: CLINIC | Age: 73
End: 2023-06-06
Payer: MEDICARE

## 2023-06-06 DIAGNOSIS — E53.8 VITAMIN B12 DEFICIENCY (NON ANEMIC): Primary | ICD-10-CM

## 2023-06-06 PROCEDURE — 96372 THER/PROPH/DIAG INJ SC/IM: CPT | Performed by: INTERNAL MEDICINE

## 2023-06-06 PROCEDURE — 99207 PR NO CHARGE NURSE ONLY: CPT

## 2023-06-06 RX ADMIN — CYANOCOBALAMIN 1000 MCG: 1000 INJECTION, SOLUTION INTRAMUSCULAR; SUBCUTANEOUS at 09:31

## 2023-07-03 ENCOUNTER — ALLIED HEALTH/NURSE VISIT (OUTPATIENT)
Dept: FAMILY MEDICINE | Facility: CLINIC | Age: 73
End: 2023-07-03
Payer: MEDICARE

## 2023-07-03 DIAGNOSIS — E53.8 VITAMIN B12 DEFICIENCY (NON ANEMIC): Primary | ICD-10-CM

## 2023-07-03 PROCEDURE — 99207 PR NO CHARGE NURSE ONLY: CPT

## 2023-07-03 PROCEDURE — 96372 THER/PROPH/DIAG INJ SC/IM: CPT | Performed by: INTERNAL MEDICINE

## 2023-07-03 RX ADMIN — CYANOCOBALAMIN 1000 MCG: 1000 INJECTION, SOLUTION INTRAMUSCULAR; SUBCUTANEOUS at 10:28

## 2023-07-03 NOTE — PROGRESS NOTES
Clinic Administered Medication Documentation      Injectable Medication Documentation    Is there an active order (written within the past 365 days, with administrations remaining, not ) in the chart? Yes.     Patient was given Cyanocobalamin (B-12). Prior to medication administration, verified patient's identity using patient s name and date of birth. Please see MAR and medication order for additional information. Patient instructed to remain in clinic for 15 minutes and report any adverse reaction to staff immediately.    Vial/Syringe: Single dose vial. Was entire vial of medication used? Yes  Was this medication supplied by the patient? No  Is this a medication the patient will need to receive again? No - not necessary to check for refills remaining.    
no

## 2023-08-01 ENCOUNTER — ALLIED HEALTH/NURSE VISIT (OUTPATIENT)
Dept: FAMILY MEDICINE | Facility: CLINIC | Age: 73
End: 2023-08-01
Payer: MEDICARE

## 2023-08-01 DIAGNOSIS — E53.8 VITAMIN B12 DEFICIENCY (NON ANEMIC): Primary | ICD-10-CM

## 2023-08-01 PROCEDURE — 99207 PR NO CHARGE NURSE ONLY: CPT

## 2023-08-01 PROCEDURE — 96372 THER/PROPH/DIAG INJ SC/IM: CPT | Performed by: INTERNAL MEDICINE

## 2023-08-01 RX ORDER — CYANOCOBALAMIN 1000 UG/ML
1000 INJECTION, SOLUTION INTRAMUSCULAR; SUBCUTANEOUS
Status: ACTIVE | OUTPATIENT
Start: 2023-08-01 | End: 2024-07-26

## 2023-08-01 RX ADMIN — CYANOCOBALAMIN 1000 MCG: 1000 INJECTION, SOLUTION INTRAMUSCULAR; SUBCUTANEOUS at 10:04

## 2023-08-10 DIAGNOSIS — E11.9 TYPE 2 DIABETES MELLITUS WITHOUT COMPLICATION, WITHOUT LONG-TERM CURRENT USE OF INSULIN (H): ICD-10-CM

## 2023-08-10 NOTE — TELEPHONE ENCOUNTER
"Routing refill request to provider for review/approval because:  Labs not current:  CR, A1C    Last Written Prescription Date:  4/12/23  Last Fill Quantity: 180,  # refills: 0   Last office visit provider:   1/6/23    Requested Prescriptions   Pending Prescriptions Disp Refills    metFORMIN (GLUCOPHAGE) 500 MG tablet [Pharmacy Med Name: METFORMIN 500MG] 180 tablet 0     Sig: TAKE ONE (1) TABLET (500 MG) BY MOUTH TWO (2) TIMES DAILY (WITH MEALS) FOR 90 DAYS       Biguanide Agents Failed - 8/10/2023  2:59 PM        Failed - Patient has documented A1c within the specified period of time.     If HgbA1C is 8 or greater, it needs to be on file within the past 3 months.  If less than 8, must be on file within the past 6 months.     Recent Labs   Lab Test 01/06/23  0904   A1C 6.8*             Failed - Patient's CR is NOT>1.4 OR Patient's EGFR is NOT<45 within past 12 mos.     Recent Labs   Lab Test 07/08/22  1341   GFRESTIMATED >90       Recent Labs   Lab Test 07/08/22  1341   CR 0.69             Passed - Patient is age 10 or older        Passed - Patient does NOT have a diagnosis of CHF.        Passed - Medication is active on med list        Passed - Patient is not pregnant        Passed - Patient has not had a positive pregnancy test within the past 12 mos.         Passed - Recent (6 mo) or future (30 days) visit within the authorizing provider's specialty     Patient had office visit in the last 6 months or has a visit in the next 30 days with authorizing provider or within the authorizing provider's specialty.  See \"Patient Info\" tab in inbasket, or \"Choose Columns\" in Meds & Orders section of the refill encounter.                 Mariia Lamas RN 08/10/23 3:00 PM  "

## 2023-08-24 ENCOUNTER — MYC MEDICAL ADVICE (OUTPATIENT)
Dept: FAMILY MEDICINE | Facility: CLINIC | Age: 73
End: 2023-08-24
Payer: MEDICARE

## 2023-08-24 ASSESSMENT — ENCOUNTER SYMPTOMS
PARESTHESIAS: 0
EYE PAIN: 0
FREQUENCY: 0
HEADACHES: 0
HEARTBURN: 0
WEAKNESS: 0
MYALGIAS: 1
SHORTNESS OF BREATH: 0
HEMATURIA: 0
BREAST MASS: 0
DYSURIA: 0
HEMATOCHEZIA: 0
NAUSEA: 0
PALPITATIONS: 0
SORE THROAT: 0
DIARRHEA: 0
ARTHRALGIAS: 1
DIZZINESS: 0
JOINT SWELLING: 1
NERVOUS/ANXIOUS: 0
CHILLS: 0
COUGH: 0
FEVER: 0
ABDOMINAL PAIN: 0
CONSTIPATION: 0

## 2023-08-24 ASSESSMENT — ACTIVITIES OF DAILY LIVING (ADL)
CURRENT_FUNCTION: MEDICATION ADMINISTRATION REQUIRES ASSISTANCE
CURRENT_FUNCTION: PREPARING MEALS REQUIRES ASSISTANCE

## 2023-08-24 NOTE — TELEPHONE ENCOUNTER
Please reach out and add a Medicare physical to her upcoming appointment, I sent a message to the son who seems to be helpful with her healthcare.

## 2023-08-25 NOTE — TELEPHONE ENCOUNTER
Please make sure that Lv receives the Medicare forms electronically ahead of time as he was told there is no 'paperwork ' for the medicare physical. Thanks.

## 2023-08-29 ASSESSMENT — PATIENT HEALTH QUESTIONNAIRE - PHQ9
SUM OF ALL RESPONSES TO PHQ QUESTIONS 1-9: 0
SUM OF ALL RESPONSES TO PHQ QUESTIONS 1-9: 0
10. IF YOU CHECKED OFF ANY PROBLEMS, HOW DIFFICULT HAVE THESE PROBLEMS MADE IT FOR YOU TO DO YOUR WORK, TAKE CARE OF THINGS AT HOME, OR GET ALONG WITH OTHER PEOPLE: NOT DIFFICULT AT ALL

## 2023-08-30 ENCOUNTER — ANCILLARY PROCEDURE (OUTPATIENT)
Dept: GENERAL RADIOLOGY | Facility: CLINIC | Age: 73
End: 2023-08-30
Attending: NURSE PRACTITIONER
Payer: MEDICARE

## 2023-08-30 ENCOUNTER — OFFICE VISIT (OUTPATIENT)
Dept: FAMILY MEDICINE | Facility: CLINIC | Age: 73
End: 2023-08-30
Payer: MEDICARE

## 2023-08-30 VITALS
BODY MASS INDEX: 41.02 KG/M2 | HEIGHT: 71 IN | DIASTOLIC BLOOD PRESSURE: 62 MMHG | HEART RATE: 74 BPM | TEMPERATURE: 98.5 F | OXYGEN SATURATION: 94 % | SYSTOLIC BLOOD PRESSURE: 114 MMHG | WEIGHT: 293 LBS

## 2023-08-30 DIAGNOSIS — E11.9 TYPE 2 DIABETES MELLITUS WITHOUT COMPLICATION, WITHOUT LONG-TERM CURRENT USE OF INSULIN (H): ICD-10-CM

## 2023-08-30 DIAGNOSIS — M25.561 ACUTE PAIN OF RIGHT KNEE: ICD-10-CM

## 2023-08-30 DIAGNOSIS — M17.31 POST-TRAUMATIC OSTEOARTHRITIS OF RIGHT KNEE: ICD-10-CM

## 2023-08-30 DIAGNOSIS — E11.42 TYPE 2 DIABETES MELLITUS WITH DIABETIC POLYNEUROPATHY, WITHOUT LONG-TERM CURRENT USE OF INSULIN (H): ICD-10-CM

## 2023-08-30 DIAGNOSIS — Z00.00 ENCOUNTER FOR MEDICARE ANNUAL WELLNESS EXAM: Primary | ICD-10-CM

## 2023-08-30 DIAGNOSIS — I10 PRIMARY HYPERTENSION: ICD-10-CM

## 2023-08-30 LAB
ANION GAP SERPL CALCULATED.3IONS-SCNC: 12 MMOL/L (ref 7–15)
BUN SERPL-MCNC: 12 MG/DL (ref 8–23)
CALCIUM SERPL-MCNC: 9.7 MG/DL (ref 8.8–10.2)
CHLORIDE SERPL-SCNC: 103 MMOL/L (ref 98–107)
CHOLEST SERPL-MCNC: 136 MG/DL
CREAT SERPL-MCNC: 0.65 MG/DL (ref 0.51–0.95)
CREAT UR-MCNC: 278 MG/DL
DEPRECATED HCO3 PLAS-SCNC: 25 MMOL/L (ref 22–29)
GFR SERPL CREATININE-BSD FRML MDRD: >90 ML/MIN/1.73M2
GLUCOSE SERPL-MCNC: 150 MG/DL (ref 70–99)
HBA1C MFR BLD: 6.5 % (ref 0–5.6)
HDLC SERPL-MCNC: 47 MG/DL
LDLC SERPL CALC-MCNC: 67 MG/DL
MICROALBUMIN UR-MCNC: 23.5 MG/L
MICROALBUMIN/CREAT UR: 8.45 MG/G CR (ref 0–25)
NONHDLC SERPL-MCNC: 89 MG/DL
POTASSIUM SERPL-SCNC: 4.2 MMOL/L (ref 3.4–5.3)
SODIUM SERPL-SCNC: 140 MMOL/L (ref 136–145)
TRIGL SERPL-MCNC: 112 MG/DL

## 2023-08-30 PROCEDURE — 80061 LIPID PANEL: CPT | Performed by: NURSE PRACTITIONER

## 2023-08-30 PROCEDURE — 82570 ASSAY OF URINE CREATININE: CPT | Performed by: NURSE PRACTITIONER

## 2023-08-30 PROCEDURE — 96372 THER/PROPH/DIAG INJ SC/IM: CPT | Performed by: INTERNAL MEDICINE

## 2023-08-30 PROCEDURE — 80048 BASIC METABOLIC PNL TOTAL CA: CPT | Performed by: NURSE PRACTITIONER

## 2023-08-30 PROCEDURE — 82043 UR ALBUMIN QUANTITATIVE: CPT | Performed by: NURSE PRACTITIONER

## 2023-08-30 PROCEDURE — 73564 X-RAY EXAM KNEE 4 OR MORE: CPT | Mod: TC | Performed by: RADIOLOGY

## 2023-08-30 PROCEDURE — 83036 HEMOGLOBIN GLYCOSYLATED A1C: CPT | Performed by: NURSE PRACTITIONER

## 2023-08-30 PROCEDURE — 99213 OFFICE O/P EST LOW 20 MIN: CPT | Mod: 25 | Performed by: NURSE PRACTITIONER

## 2023-08-30 PROCEDURE — 36415 COLL VENOUS BLD VENIPUNCTURE: CPT | Performed by: NURSE PRACTITIONER

## 2023-08-30 PROCEDURE — G0439 PPPS, SUBSEQ VISIT: HCPCS | Performed by: NURSE PRACTITIONER

## 2023-08-30 RX ADMIN — CYANOCOBALAMIN 1000 MCG: 1000 INJECTION, SOLUTION INTRAMUSCULAR; SUBCUTANEOUS at 11:00

## 2023-08-30 ASSESSMENT — ENCOUNTER SYMPTOMS
NERVOUS/ANXIOUS: 0
HEMATOCHEZIA: 0
PARESTHESIAS: 0
HEADACHES: 0
JOINT SWELLING: 1
COUGH: 0
ARTHRALGIAS: 1
FEVER: 0
HEMATURIA: 0
HEARTBURN: 0
CONSTIPATION: 0
DYSURIA: 0
SHORTNESS OF BREATH: 0
BREAST MASS: 0
WEAKNESS: 0
EYE PAIN: 0
FREQUENCY: 0
CHILLS: 0
NAUSEA: 0
DIZZINESS: 0
SORE THROAT: 0
MYALGIAS: 1
DIARRHEA: 0
ABDOMINAL PAIN: 0
PALPITATIONS: 0

## 2023-08-30 ASSESSMENT — ACTIVITIES OF DAILY LIVING (ADL)
CURRENT_FUNCTION: PREPARING MEALS REQUIRES ASSISTANCE
CURRENT_FUNCTION: MEDICATION ADMINISTRATION REQUIRES ASSISTANCE

## 2023-08-30 NOTE — PATIENT INSTRUCTIONS
Follow up with orthopedics as planned, your knee pain is likely a combination of soft tissue injury (ligament/tendon/muscle) as well as arthritis.. Use your walker at all times    Patient Education   Personalized Prevention Plan  You are due for the preventive services outlined below.  Your care team is available to assist you in scheduling these services.  If you have already completed any of these items, please share that information with your care team to update in your medical record.  Health Maintenance Due   Topic Date Due    Osteoporosis Screening  Never done    Discuss Advance Care Planning  Never done    Depression Action Plan  Never done    Eye Exam  Never done    Zoster (Shingles) Vaccine (1 of 2) Never done    LUNG CANCER SCREENING  Never done    Annual Wellness Visit  Never done    Mammogram  11/21/2016    Diptheria Tetanus Pertussis (DTAP/TDAP/TD) Vaccine (3 - Td or Tdap) 06/24/2021    COVID-19 Vaccine (3 - Moderna series) 07/27/2021    Cholesterol Lab  01/19/2022    Kidney Microalbumin Urine Test  01/19/2022    A1C Lab  04/06/2023    Basic Metabolic Panel  07/08/2023

## 2023-08-30 NOTE — PROGRESS NOTES
SUBJECTIVE:   Kerry is a 73 year old who presents for Preventive Visit.    Patient had a fall about 2 months ago in [place]. She continues to have right knee pain.  Pain is primarily over the knee cap, some pain laterally.  Had used a walker prior to the fall but did not have her walker with her when she was walking back to the parking lot and tripped over an uneven sidewalk.  She was evaluated at that time.  Has not worsened but really has not has not worsened but really has not resolved since the injury.  Her brother Ana is with her today and reports that she limps more now than she did before the fall.    Follows with Dr. Pastor, history of dementia.  Lives with her brother who does all the cooking and manages her medications.  After the fall she was seen by home health to help with showering but Ana reports that she is able to take care of that safely by herself now.    Patient was unable to recall her birthday.  The clock and questions were not completed today    Her son Lv has been communicating with us and he would like her vitamin B12 shot today.  Also agreeable to lab work but will hold off on any vaccines today      8/30/2023     9:27 AM   Additional Questions   Roomed by stacia leone   Accompanied by Brother - Ana     Answers submitted by the patient for this visit:  Patient Health Questionnaire (Submitted on 8/29/2023)  If you checked off any problems, how difficult have these problems made it for you to do your work, take care of things at home, or get along with other people?: Not difficult at all  PHQ9 TOTAL SCORE: 0      Are you in the first 12 months of your Medicare coverage?  No - Vision screening was not completed today as patient has been covered by Medicare >1 year and this is not her Welcome to Medicare Visit. Patient does wear glasses. Patient unsure when last eye visit was, does struggle with memory.    Healthy Habits:     In general, how would you rate your overall health?  Fair    " Frequency of exercise:  None    Do you usually eat at least 4 servings of fruit and vegetables a day, include whole grains    & fiber and avoid regularly eating high fat or \"junk\" foods?  Yes    Taking medications regularly:  Yes    Medication side effects:  None    Ability to successfully perform activities of daily living:  Preparing meals requires assistance and medication administration requires assistance    Home Safety:  No safety concerns identified    Hearing Impairment:  No hearing concerns    In the past 6 months, have you been bothered by leaking of urine?  No    In general, how would you rate your overall mental or emotional health?  Fair    Additional concerns today:  No  Fall  Associated symptoms include arthralgias, joint swelling and myalgias. Pertinent negatives include no abdominal pain, chest pain, chills, congestion, coughing, fever, headaches, nausea, rash, sore throat or weakness.       Have you ever done Advance Care Planning? (For example, a Health Directive, POLST, or a discussion with a medical provider or your loved ones about your wishes): unsure, should check with her son - Lv      Right Ear:        500 Hz RESPONSE-  Pass   1000 Hz: RESPONSE- Pass   2000 Hz: RESPONSE- Fail   4000 Hz: RESPONSE- Fail    Left Ear:      4000 Hz: RESPONSE- Fail   2000 Hz: RESPONSE- Fail   1000 Hz: RESPONSE- Fail    500 Hz: RESPONSE- Pass    Hearing Acuity: REFER    Hearing Assessment: abnormal-- will consider audiology    Fall risk  Fallen 2 or more times in the past year?: Yes  Any fall with injury in the past year?: No    Cognitive Screening  - not completed, patient unable to do complete due to memory issues  1) Repeat 3 items (Leader, Season, Table)    2) Clock draw:  unable  3) 3 item recall:not completed  Results:  unable to tell us her ,     Mini-CogTM Copyright CASTRO Wilder. Licensed by the author for use in [facility]; reprinted with permission (scarlet@.[email or web address]). All rights reserved.  "     Do you have sleep apnea, excessive snoring or daytime drowsiness? : no    Reviewed and updated as needed this visit by clinical staff   Tobacco  Allergies  Meds              Reviewed and updated as needed this visit by Provider                 Social History     Tobacco Use    Smoking status: Former     Types: Cigarettes    Smokeless tobacco: Never    Tobacco comments:     quit 12-15 years ago   Substance Use Topics    Alcohol use: Not Currently             8/24/2023     4:29 PM   Alcohol Use   Prescreen: >3 drinks/day or >7 drinks/week? Not Applicable     Do you have a current opioid prescription? No  Do you use any other controlled substances or medications that are not prescribed by a provider? None          Current providers sharing in care for this patient include: Patient Care Team:  iLgia Mann NP as PCP - General (Family Medicine)  Jameel Faulkner MD as Assigned PCP    The following health maintenance items are reviewed in Epic and correct as of today:  Health Maintenance   Topic Date Due    DEXA  Never done    ADVANCE CARE PLANNING  Never done    DEPRESSION ACTION PLAN  Never done    EYE EXAM  Never done    ZOSTER IMMUNIZATION (1 of 2) Never done    LUNG CANCER SCREENING  Never done    MEDICARE ANNUAL WELLNESS VISIT  Never done    MAMMO SCREENING  11/21/2016    DTAP/TDAP/TD IMMUNIZATION (3 - Td or Tdap) 06/24/2021    COVID-19 Vaccine (3 - Moderna series) 07/27/2021    LIPID  01/19/2022    MICROALBUMIN  01/19/2022    A1C  04/06/2023    BMP  07/08/2023    INFLUENZA VACCINE (1) 09/01/2023    DIABETIC FOOT EXAM  01/06/2024    ANNUAL REVIEW OF HM ORDERS  01/06/2024    PHQ-9  02/29/2024    FALL RISK ASSESSMENT  08/30/2024    HEPATITIS C SCREENING  Completed    Pneumococcal Vaccine: 65+ Years  Completed    IPV IMMUNIZATION  Aged Out    MENINGITIS IMMUNIZATION  Aged Out    COLORECTAL CANCER SCREENING  Discontinued     Lab work is in process  No longer does mammograms  No longer doing colon cancer  "screen      Pertinent mammograms are reviewed under the imaging tab.    Review of Systems   Constitutional:  Negative for chills and fever.   HENT:  Negative for congestion, ear pain, hearing loss and sore throat.    Eyes:  Negative for pain and visual disturbance.   Respiratory:  Negative for cough and shortness of breath.    Cardiovascular:  Negative for chest pain, palpitations and peripheral edema.   Gastrointestinal:  Negative for abdominal pain, constipation, diarrhea, heartburn, hematochezia and nausea.   Breasts:  Negative for tenderness, breast mass and discharge.   Genitourinary:  Negative for dysuria, frequency, genital sores, hematuria, pelvic pain, urgency, vaginal bleeding and vaginal discharge.   Musculoskeletal:  Positive for arthralgias, joint swelling and myalgias.   Skin:  Negative for rash.   Neurological:  Negative for dizziness, weakness, headaches and paresthesias.   Psychiatric/Behavioral:  Negative for mood changes. The patient is not nervous/anxious.          OBJECTIVE:   /62 (BP Location: Right arm, Patient Position: Sitting)   Pulse 74   Temp 98.5  F (36.9  C)   Ht 1.803 m (5' 11\")   Wt 133 kg (293 lb 4.8 oz)   LMP  (LMP Unknown)   SpO2 94%   BMI 40.91 kg/m   Estimated body mass index is 40.91 kg/m  as calculated from the following:    Height as of this encounter: 1.803 m (5' 11\").    Weight as of this encounter: 133 kg (293 lb 4.8 oz).  Physical Exam  GENERAL: healthy, alert and no distress  Good eye contact, clear speech, participates in conversation  Reports tenderness over the right patella with palpation and laterally as well.  No acute swelling, no erythema.  She is walking with a walker today      ASSESSMENT / PLAN:       ICD-10-CM    1. Encounter for Medicare annual wellness exam  Z00.00 PRIMARY CARE FOLLOW-UP SCHEDULING      2. Type 2 diabetes mellitus with diabetic polyneuropathy, without long-term current use of insulin (H)  E11.42 Adult Eye  Referral     " Lipid panel reflex to direct LDL Non-fasting     Albumin Random Urine Quantitative with Creat Ratio     HEMOGLOBIN A1C      3. Primary hypertension  I10 BASIC METABOLIC PANEL      4. Type 2 diabetes mellitus without complication, without long-term current use of insulin (H)  E11.9 metFORMIN (GLUCOPHAGE) 500 MG tablet      5. Acute pain of right knee  M25.561 XR Knee Right G/E 4 Views     Orthopedic  Referral      6. Post-traumatic osteoarthritis of right knee  M17.31 Orthopedic  Referral                COUNSELING:  Reviewed preventive health counseling, as reflected in patient instructions       Regular exercise       Healthy diet/nutrition       Vision screening       Dental care       Bladder control       Fall risk prevention       Osteoporosis prevention/bone health        She reports that she has quit smoking. Her smoking use included cigarettes. She has never used smokeless tobacco.      Appropriate preventive services were discussed with this patient, including applicable screening as appropriate for cardiovascular disease, diabetes, osteopenia/osteoporosis, and glaucoma.  As appropriate for age/gender, discussed screening for colorectal cancer, prostate cancer, breast cancer, and cervical cancer. Checklist reviewing preventive services available has been given to the patient.    Reviewed patients plan of care and provided an AVS. The Basic Care Plan (routine screening as documented in Health Maintenance) for Kerry meets the Care Plan requirement. This Care Plan has been established and reviewed with the Patient and brother.          Ligia Mann NP  [facility]    Identified Health Risks:  Follow-up with orthopedics as planned.  Knee pain is likely a combination of soft tissue damage follow-up with orthopedics as planned

## 2023-09-20 ENCOUNTER — TRANSFERRED RECORDS (OUTPATIENT)
Dept: HEALTH INFORMATION MANAGEMENT | Facility: CLINIC | Age: 73
End: 2023-09-20
Payer: MEDICARE

## 2023-10-03 ENCOUNTER — ALLIED HEALTH/NURSE VISIT (OUTPATIENT)
Dept: FAMILY MEDICINE | Facility: CLINIC | Age: 73
End: 2023-10-03
Payer: MEDICARE

## 2023-10-03 DIAGNOSIS — E53.8 VITAMIN B12 DEFICIENCY (NON ANEMIC): Primary | ICD-10-CM

## 2023-10-03 PROCEDURE — 96372 THER/PROPH/DIAG INJ SC/IM: CPT | Performed by: INTERNAL MEDICINE

## 2023-10-03 PROCEDURE — 99207 PR NO CHARGE NURSE ONLY: CPT

## 2023-10-03 RX ADMIN — CYANOCOBALAMIN 1000 MCG: 1000 INJECTION, SOLUTION INTRAMUSCULAR; SUBCUTANEOUS at 09:35

## 2023-10-18 DIAGNOSIS — F33.42 RECURRENT MAJOR DEPRESSION IN COMPLETE REMISSION (H): ICD-10-CM

## 2023-10-18 RX ORDER — CITALOPRAM HYDROBROMIDE 20 MG/1
TABLET ORAL
Qty: 90 TABLET | Refills: 3 | Status: SHIPPED | OUTPATIENT
Start: 2023-10-18 | End: 2024-09-05

## 2023-11-06 ENCOUNTER — ALLIED HEALTH/NURSE VISIT (OUTPATIENT)
Dept: FAMILY MEDICINE | Facility: CLINIC | Age: 73
End: 2023-11-06
Payer: MEDICARE

## 2023-11-06 DIAGNOSIS — E53.8 VITAMIN B12 DEFICIENCY (NON ANEMIC): Primary | ICD-10-CM

## 2023-11-06 PROCEDURE — 96372 THER/PROPH/DIAG INJ SC/IM: CPT | Performed by: INTERNAL MEDICINE

## 2023-11-06 PROCEDURE — 99207 PR NO CHARGE NURSE ONLY: CPT

## 2023-11-06 RX ADMIN — CYANOCOBALAMIN 1000 MCG: 1000 INJECTION, SOLUTION INTRAMUSCULAR; SUBCUTANEOUS at 09:47

## 2023-12-05 ENCOUNTER — ALLIED HEALTH/NURSE VISIT (OUTPATIENT)
Dept: FAMILY MEDICINE | Facility: CLINIC | Age: 73
End: 2023-12-05
Payer: MEDICARE

## 2023-12-05 DIAGNOSIS — E53.8 VITAMIN B12 DEFICIENCY (NON ANEMIC): Primary | ICD-10-CM

## 2023-12-05 PROCEDURE — 96372 THER/PROPH/DIAG INJ SC/IM: CPT | Performed by: INTERNAL MEDICINE

## 2023-12-05 PROCEDURE — 99207 PR NO CHARGE NURSE ONLY: CPT

## 2023-12-05 RX ADMIN — CYANOCOBALAMIN 1000 MCG: 1000 INJECTION, SOLUTION INTRAMUSCULAR; SUBCUTANEOUS at 09:49

## 2023-12-31 ENCOUNTER — HEALTH MAINTENANCE LETTER (OUTPATIENT)
Age: 73
End: 2023-12-31

## 2024-01-09 ENCOUNTER — ALLIED HEALTH/NURSE VISIT (OUTPATIENT)
Dept: FAMILY MEDICINE | Facility: CLINIC | Age: 74
End: 2024-01-09
Payer: MEDICARE

## 2024-01-09 DIAGNOSIS — E53.8 VITAMIN B12 DEFICIENCY (NON ANEMIC): Primary | ICD-10-CM

## 2024-01-09 PROCEDURE — 99207 PR NO CHARGE NURSE ONLY: CPT

## 2024-01-09 PROCEDURE — 96372 THER/PROPH/DIAG INJ SC/IM: CPT | Performed by: INTERNAL MEDICINE

## 2024-01-09 RX ADMIN — CYANOCOBALAMIN 1000 MCG: 1000 INJECTION, SOLUTION INTRAMUSCULAR; SUBCUTANEOUS at 09:46

## 2024-01-10 DIAGNOSIS — E11.42 TYPE 2 DIABETES MELLITUS WITH DIABETIC POLYNEUROPATHY, WITHOUT LONG-TERM CURRENT USE OF INSULIN (H): ICD-10-CM

## 2024-01-10 RX ORDER — GABAPENTIN 100 MG/1
CAPSULE ORAL
Qty: 360 CAPSULE | Refills: 2 | Status: SHIPPED | OUTPATIENT
Start: 2024-01-10 | End: 2024-09-05

## 2024-02-05 DIAGNOSIS — F33.42 RECURRENT MAJOR DEPRESSION IN COMPLETE REMISSION (H): ICD-10-CM

## 2024-02-05 DIAGNOSIS — I10 PRIMARY HYPERTENSION: ICD-10-CM

## 2024-02-05 DIAGNOSIS — E11.9 TYPE 2 DIABETES MELLITUS WITHOUT COMPLICATION, WITHOUT LONG-TERM CURRENT USE OF INSULIN (H): ICD-10-CM

## 2024-02-05 DIAGNOSIS — M19.90 IDIOPATHIC OSTEOARTHRITIS: ICD-10-CM

## 2024-02-06 ENCOUNTER — ALLIED HEALTH/NURSE VISIT (OUTPATIENT)
Dept: FAMILY MEDICINE | Facility: CLINIC | Age: 74
End: 2024-02-06
Payer: MEDICARE

## 2024-02-06 DIAGNOSIS — E53.8 VITAMIN B12 DEFICIENCY (NON ANEMIC): Primary | ICD-10-CM

## 2024-02-06 PROCEDURE — 96372 THER/PROPH/DIAG INJ SC/IM: CPT | Performed by: INTERNAL MEDICINE

## 2024-02-06 PROCEDURE — 99207 PR NO CHARGE NURSE ONLY: CPT

## 2024-02-06 RX ORDER — LOSARTAN POTASSIUM 100 MG/1
TABLET ORAL
Qty: 90 TABLET | Refills: 0 | Status: SHIPPED | OUTPATIENT
Start: 2024-02-06 | End: 2024-04-15

## 2024-02-06 RX ORDER — RISPERIDONE 1 MG/1
TABLET ORAL
Qty: 135 TABLET | Refills: 0 | Status: SHIPPED | OUTPATIENT
Start: 2024-02-06 | End: 2024-05-30

## 2024-02-06 RX ORDER — ATORVASTATIN CALCIUM 20 MG/1
TABLET, FILM COATED ORAL
Qty: 90 TABLET | Refills: 0 | Status: SHIPPED | OUTPATIENT
Start: 2024-02-06 | End: 2024-05-21

## 2024-02-06 RX ORDER — CELECOXIB 200 MG/1
CAPSULE ORAL
Qty: 90 CAPSULE | Refills: 0 | Status: SHIPPED | OUTPATIENT
Start: 2024-02-06 | End: 2024-07-05

## 2024-02-06 RX ADMIN — CYANOCOBALAMIN 1000 MCG: 1000 INJECTION, SOLUTION INTRAMUSCULAR; SUBCUTANEOUS at 09:30

## 2024-02-06 NOTE — TELEPHONE ENCOUNTER
Left message on voicemail, when Kerry calls back please assist her on scheduling labs and an office visit with Ligia Mann.

## 2024-02-21 ENCOUNTER — OFFICE VISIT (OUTPATIENT)
Dept: FAMILY MEDICINE | Facility: CLINIC | Age: 74
End: 2024-02-21
Payer: MEDICARE

## 2024-02-21 VITALS
SYSTOLIC BLOOD PRESSURE: 108 MMHG | DIASTOLIC BLOOD PRESSURE: 66 MMHG | TEMPERATURE: 99 F | RESPIRATION RATE: 20 BRPM | HEIGHT: 71 IN | BODY MASS INDEX: 38.5 KG/M2 | OXYGEN SATURATION: 97 % | WEIGHT: 275 LBS | HEART RATE: 71 BPM

## 2024-02-21 DIAGNOSIS — F03.B0 MODERATE DEMENTIA WITHOUT BEHAVIORAL DISTURBANCE, PSYCHOTIC DISTURBANCE, MOOD DISTURBANCE, OR ANXIETY, UNSPECIFIED DEMENTIA TYPE (H): ICD-10-CM

## 2024-02-21 DIAGNOSIS — L84 CALLUS OF FOOT: ICD-10-CM

## 2024-02-21 DIAGNOSIS — E66.01 MORBID OBESITY (H): ICD-10-CM

## 2024-02-21 DIAGNOSIS — F33.42 RECURRENT MAJOR DEPRESSION IN COMPLETE REMISSION (H): ICD-10-CM

## 2024-02-21 DIAGNOSIS — E11.42 TYPE 2 DIABETES MELLITUS WITH DIABETIC POLYNEUROPATHY, WITHOUT LONG-TERM CURRENT USE OF INSULIN (H): Primary | ICD-10-CM

## 2024-02-21 DIAGNOSIS — E78.5 HYPERLIPIDEMIA WITH TARGET LOW DENSITY LIPOPROTEIN (LDL) CHOLESTEROL LESS THAN 100 MG/DL: ICD-10-CM

## 2024-02-21 DIAGNOSIS — I10 PRIMARY HYPERTENSION: ICD-10-CM

## 2024-02-21 LAB
ALBUMIN SERPL BCG-MCNC: 4.3 G/DL (ref 3.5–5.2)
ALP SERPL-CCNC: 103 U/L (ref 40–150)
ALT SERPL W P-5'-P-CCNC: 13 U/L (ref 0–50)
ANION GAP SERPL CALCULATED.3IONS-SCNC: 12 MMOL/L (ref 7–15)
AST SERPL W P-5'-P-CCNC: 16 U/L (ref 0–45)
BASOPHILS # BLD AUTO: 0 10E3/UL (ref 0–0.2)
BASOPHILS NFR BLD AUTO: 0 %
BILIRUB SERPL-MCNC: 0.5 MG/DL
BUN SERPL-MCNC: 13.1 MG/DL (ref 8–23)
CALCIUM SERPL-MCNC: 9.4 MG/DL (ref 8.8–10.2)
CHLORIDE SERPL-SCNC: 102 MMOL/L (ref 98–107)
CREAT SERPL-MCNC: 0.63 MG/DL (ref 0.51–0.95)
DEPRECATED HCO3 PLAS-SCNC: 23 MMOL/L (ref 22–29)
EGFRCR SERPLBLD CKD-EPI 2021: >90 ML/MIN/1.73M2
EOSINOPHIL # BLD AUTO: 0.1 10E3/UL (ref 0–0.7)
EOSINOPHIL NFR BLD AUTO: 1 %
ERYTHROCYTE [DISTWIDTH] IN BLOOD BY AUTOMATED COUNT: 11.8 % (ref 10–15)
GLUCOSE SERPL-MCNC: 127 MG/DL (ref 70–99)
HBA1C MFR BLD: 6.5 % (ref 0–5.6)
HCT VFR BLD AUTO: 45.4 % (ref 35–47)
HGB BLD-MCNC: 15.1 G/DL (ref 11.7–15.7)
IMM GRANULOCYTES # BLD: 0 10E3/UL
IMM GRANULOCYTES NFR BLD: 0 %
LYMPHOCYTES # BLD AUTO: 1.5 10E3/UL (ref 0.8–5.3)
LYMPHOCYTES NFR BLD AUTO: 18 %
MCH RBC QN AUTO: 30.5 PG (ref 26.5–33)
MCHC RBC AUTO-ENTMCNC: 33.3 G/DL (ref 31.5–36.5)
MCV RBC AUTO: 92 FL (ref 78–100)
MONOCYTES # BLD AUTO: 0.6 10E3/UL (ref 0–1.3)
MONOCYTES NFR BLD AUTO: 7 %
NEUTROPHILS # BLD AUTO: 6.3 10E3/UL (ref 1.6–8.3)
NEUTROPHILS NFR BLD AUTO: 74 %
PLATELET # BLD AUTO: 275 10E3/UL (ref 150–450)
POTASSIUM SERPL-SCNC: 4.2 MMOL/L (ref 3.4–5.3)
PROT SERPL-MCNC: 7 G/DL (ref 6.4–8.3)
RBC # BLD AUTO: 4.95 10E6/UL (ref 3.8–5.2)
SODIUM SERPL-SCNC: 137 MMOL/L (ref 135–145)
WBC # BLD AUTO: 8.5 10E3/UL (ref 4–11)

## 2024-02-21 PROCEDURE — 99214 OFFICE O/P EST MOD 30 MIN: CPT | Performed by: NURSE PRACTITIONER

## 2024-02-21 PROCEDURE — 85025 COMPLETE CBC W/AUTO DIFF WBC: CPT | Mod: QW | Performed by: NURSE PRACTITIONER

## 2024-02-21 PROCEDURE — 80053 COMPREHEN METABOLIC PANEL: CPT | Performed by: NURSE PRACTITIONER

## 2024-02-21 PROCEDURE — 36415 COLL VENOUS BLD VENIPUNCTURE: CPT | Performed by: NURSE PRACTITIONER

## 2024-02-21 PROCEDURE — 83036 HEMOGLOBIN GLYCOSYLATED A1C: CPT | Performed by: NURSE PRACTITIONER

## 2024-02-21 RX ORDER — LOSARTAN POTASSIUM 50 MG/1
50 TABLET ORAL DAILY
Qty: 90 TABLET | Refills: 1 | Status: SHIPPED | OUTPATIENT
Start: 2024-02-21 | End: 2024-09-05

## 2024-02-21 RX ORDER — RESPIRATORY SYNCYTIAL VIRUS VACCINE 120MCG/0.5
0.5 KIT INTRAMUSCULAR ONCE
Qty: 1 EACH | Refills: 0 | Status: CANCELLED | OUTPATIENT
Start: 2024-02-21 | End: 2024-02-21

## 2024-02-21 ASSESSMENT — PATIENT HEALTH QUESTIONNAIRE - PHQ9
10. IF YOU CHECKED OFF ANY PROBLEMS, HOW DIFFICULT HAVE THESE PROBLEMS MADE IT FOR YOU TO DO YOUR WORK, TAKE CARE OF THINGS AT HOME, OR GET ALONG WITH OTHER PEOPLE: SOMEWHAT DIFFICULT
SUM OF ALL RESPONSES TO PHQ QUESTIONS 1-9: 12
SUM OF ALL RESPONSES TO PHQ QUESTIONS 1-9: 12

## 2024-02-21 NOTE — PROGRESS NOTES
"  Assessment & Plan     (E11.42) Type 2 diabetes mellitus with diabetic polyneuropathy, without long-term current use of insulin (H)  (primary encounter diagnosis)  Comment: controlled, due for lab  Plan: HEMOGLOBIN A1C, Orthopedic  Referral            (E78.5) Hyperlipidemia with target low density lipoprotein (LDL) cholesterol less than 100 mg/dL  Comment:   Plan:     (F03.B0) Moderate dementia without behavioral disturbance, psychotic disturbance, mood disturbance, or anxiety, unspecified dementia type (H)  Comment: stable  Plan: CBC with Platelets & Differential,         Comprehensive metabolic panel            (E66.01) Morbid obesity (H)  Comment: she has lost #20, feels well except for dizziness (see below)  Plan:     (F33.42) Recurrent major depression in complete remission (H24)  Comment: stable  Plan: CBC with Platelets & Differential,         Comprehensive metabolic panel            (I10) Primary hypertension  Comment: bp a bit lower, will reduce losartan dose in light of dizziness  Plan: losartan (COZAAR) 50 MG tablet            (L84) Callus of foot  Comment: new callous on left foot 3rd toe, she will [facility]  I gave her a list of providers to might be able to help trip her toe nails  Plan: Orthopedic  Referral                    BMI  Estimated body mass index is 38.35 kg/m  as calculated from the following:    Height as of this encounter: 1.803 m (5' 11\").    Weight as of this encounter: 124.7 kg (275 lb).             Jessica Payne is a 73 year old, presenting for the following health issues:  Had some dizziness last week, I told her that she has lost #20 over 6 months. Here with brother Ana  She lives with brother Ana, he has lives with her since his wife passed , 3 years now. He does the cooking, works to eat healthy, hadn't noted her wt loss and neither had she. Bp is lower than usual, will cut bp med dose in half and she will f[place] pharmacy [place] to check bp and monitor wt    Has neuropathy, used to have someone to cut her toes but not any longer    Diabetes--feels it has been under control. Due for eye exam will schedule at [facility]    Has supply of medications  Declines care coordination   Chronic Disease Management      2/21/2024    10:42 AM   Additional Questions   Roomed by gabriel   Accompanied by n/a     History of Present Illness       Diabetes:   She presents for follow up of diabetes.    She is not checking blood glucose.         She has no concerns regarding her diabetes at this time.   She is not experiencing numbness or burning in feet, excessive thirst, blurry vision, weight changes or redness, sores or blisters on feet. The patient has not had a diabetic eye exam in the last 12 months.          Hyperlipidemia:  She presents for follow up of hyperlipidemia.   She is taking medication to lower cholesterol. She is not having myalgia or other side effects to statin medications.    Hypertension: She presents for follow up of hypertension.  She does not check blood pressure  regularly outside of the clinic. Outpatient blood pressures have not been over 140/90. She does not follow a low salt diet.     She eats 2-3 servings of fruits and vegetables daily.She consumes 0 sweetened beverage(s) daily.She exercises with enough effort to increase her heart rate 9 or less minutes per day.  She exercises with enough effort to increase her heart rate 3 or less days per week.   She is taking medications regularly.                     Objective    /66 (BP Location: Right arm, Patient Position: Sitting)   Pulse 71   Temp 99  F (37.2  C) (Tympanic)   Resp 20   Ht 1.803 m (5' 11\")   Wt 124.7 kg (275 lb)   LMP  (LMP Unknown)   SpO2 97%   BMI 38.35 kg/m    Body mass index is 38.35 kg/m .  Physical Exam   A, pleasant  Monofillament testing--Sensory exam of the foot is not normal she has some sensation and some not to the ankle., tested with the " monofilament. Good pulses, she has a callous 3rd toe left foot that is new, good peripheral pulses.  Heart RRR lungs clear                Signed Electronically by: Ligia Mann NP

## 2024-02-21 NOTE — LETTER
My Depression Action Plan  Name: Kerry Vazquez   Date of Birth 1950  Date: 2/21/2024    My doctor: Ligia Mann   My clinic: [address] [address] [phone]            GREEN    ZONE   Good Control    What it looks like:   Things are going generally well. You have normal ups and downs. You may even feel depressed from time to time, but bad moods usually last less than a day.   What you need to do:  Continue to care for yourself (see self care plan)  Check your depression survival kit and update it as needed  Follow your physician s recommendations including any medication.  Do not stop taking medication unless you consult with your physician first.             YELLOW         ZONE Getting Worse    What it looks like:   Depression is starting to interfere with your life.   It may be hard to get out of bed; you may be starting to isolate yourself from others.  Symptoms of depression are starting to last most all day and this has happened for several days.   You may have suicidal thoughts but they are not constant.   What you need to do:     Call your care team. Your response to treatment will improve if you keep your care team informed of your progress. Yellow periods are signs an adjustment may need to be made.     Continue your self-care.  Just get dressed and ready for the day.  Don't give yourself time to talk yourself out of it.    Talk to someone in your support network.    Open up your Depression Self-Care Plan/Wellness Kit.             RED    ZONE Medical Alert - Get Help    What it looks like:   Depression is seriously interfering with your life.   You may experience these or other symptoms: You can t get out of bed most days, can t work or engage in other necessary activities, you have trouble taking care of basic hygiene, or basic responsibilities, thoughts of suicide or death that will not go away, self-injurious  behavior.     What you need to do:  Call your care team and request a same-day appointment. If they are not available (weekends or after hours) call your local crisis line, emergency room or 911.          Depression Self-Care Plan / Wellness Kit    Many people find that medication and therapy are helpful treatments for managing depression. In addition, making small changes to your everyday life can help to boost your mood and improve your wellbeing. Below are some tips for you to consider. Be sure to talk with your medical provider and/or behavioral health consultant if your symptoms are worsening or not improving.     Sleep   Sleep hygiene  means all of the habits that support good, restful sleep. It includes maintaining a consistent bedtime and wake time, using your bedroom only for sleeping or sex, and keeping the bedroom dark and free of distractions like a computer, smartphone, or television.     Develop a Healthy Routine  Maintain good hygiene. Get out of bed in the morning, make your bed, brush your teeth, take a shower, and get dressed. Don t spend too much time viewing media that makes you feel stressed. Find time to relax each day.    Exercise  Get some form of exercise every day. This will help reduce pain and release endorphins, the  feel good  chemicals in your brain. It can be as simple as just going for a walk or doing some gardening, anything that will get you moving.      Diet  Strive to eat healthy foods, including fruits and vegetables. Drink plenty of water. Avoid excessive sugar, caffeine, alcohol, and other mood-altering substances.     Stay Connected with Others  Stay in touch with friends and family members.    Manage Your Mood  Try deep breathing, massage therapy, biofeedback, or meditation. Take part in fun activities when you can. Try to find something to smile about each day.     Psychotherapy  Be open to working with a therapist if your provider recommends it.     Medication  Be sure to  take your medication as prescribed. Most anti-depressants need to be taken every day. It usually takes several weeks for medications to work. Not all medicines work for all people. It is important to follow-up with your provider to make sure you have a treatment plan that is working for you. Do not stop your medication abruptly without first discussing it with your provider.    Crisis Resources   These hotlines are for both adults and children. They and are open 24 hours a day, 7 days a week unless noted otherwise.    National Suicide Prevention Lifeline   988 or 8-296-569-FXGM (1343)    Crisis Text Line    www.crisistextline.org  Text HOME to 469979 from anywhere in the [place], anytime, about any type of crisis. A live, trained crisis counselor will receive the text and respond quickly.    [place] Lifeline for LGBTQ Youth  A national crisis intervention and suicide lifeline for LGBTQ youth under 25. Provides a safe place to talk without judgement. Call 1-626.458.9719; text START to 891470 or visit www.thetrevorproject.org to talk to a trained counselor.    For [facility] crisis numbers, visit the [facility] website at:  https://[place].gov/dhs/people-we-serve/adults/health-care/mental-health/resources/crisis-contacts.jsp

## 2024-03-05 ENCOUNTER — ALLIED HEALTH/NURSE VISIT (OUTPATIENT)
Dept: FAMILY MEDICINE | Facility: CLINIC | Age: 74
End: 2024-03-05
Payer: MEDICARE

## 2024-03-05 DIAGNOSIS — E53.8 VITAMIN B12 DEFICIENCY (NON ANEMIC): Primary | ICD-10-CM

## 2024-03-05 PROCEDURE — 99207 PR NO CHARGE NURSE ONLY: CPT

## 2024-03-05 PROCEDURE — 96372 THER/PROPH/DIAG INJ SC/IM: CPT | Performed by: INTERNAL MEDICINE

## 2024-03-05 RX ADMIN — CYANOCOBALAMIN 1000 MCG: 1000 INJECTION, SOLUTION INTRAMUSCULAR; SUBCUTANEOUS at 09:41

## 2024-03-18 ENCOUNTER — MYC MEDICAL ADVICE (OUTPATIENT)
Dept: FAMILY MEDICINE | Facility: CLINIC | Age: 74
End: 2024-03-18
Payer: MEDICARE

## 2024-03-18 DIAGNOSIS — E11.42 TYPE 2 DIABETES MELLITUS WITH DIABETIC POLYNEUROPATHY, WITHOUT LONG-TERM CURRENT USE OF INSULIN (H): ICD-10-CM

## 2024-03-18 DIAGNOSIS — L84 CALLUS OF FOOT: Primary | ICD-10-CM

## 2024-03-19 NOTE — TELEPHONE ENCOUNTER
Please put a copy of the form in the mail for her son Francois that has phone numbers for people in the community who do toenail care thank you

## 2024-04-02 ENCOUNTER — ALLIED HEALTH/NURSE VISIT (OUTPATIENT)
Dept: FAMILY MEDICINE | Facility: CLINIC | Age: 74
End: 2024-04-02
Payer: MEDICARE

## 2024-04-02 DIAGNOSIS — E53.8 VITAMIN B12 DEFICIENCY (NON ANEMIC): Primary | ICD-10-CM

## 2024-04-02 PROCEDURE — 99207 PR NO CHARGE NURSE ONLY: CPT

## 2024-04-02 PROCEDURE — 96372 THER/PROPH/DIAG INJ SC/IM: CPT | Performed by: INTERNAL MEDICINE

## 2024-04-02 RX ADMIN — CYANOCOBALAMIN 1000 MCG: 1000 INJECTION, SOLUTION INTRAMUSCULAR; SUBCUTANEOUS at 09:48

## 2024-04-14 ENCOUNTER — MYC MEDICAL ADVICE (OUTPATIENT)
Dept: FAMILY MEDICINE | Facility: CLINIC | Age: 74
End: 2024-04-14
Payer: MEDICARE

## 2024-04-14 DIAGNOSIS — E11.9 TYPE 2 DIABETES MELLITUS WITHOUT COMPLICATION, WITHOUT LONG-TERM CURRENT USE OF INSULIN (H): ICD-10-CM

## 2024-05-06 ENCOUNTER — MYC MEDICAL ADVICE (OUTPATIENT)
Dept: FAMILY MEDICINE | Facility: CLINIC | Age: 74
End: 2024-05-06
Payer: MEDICARE

## 2024-05-07 ENCOUNTER — ALLIED HEALTH/NURSE VISIT (OUTPATIENT)
Dept: FAMILY MEDICINE | Facility: CLINIC | Age: 74
End: 2024-05-07
Payer: MEDICARE

## 2024-05-07 DIAGNOSIS — E53.8 VITAMIN B12 DEFICIENCY (NON ANEMIC): Primary | ICD-10-CM

## 2024-05-07 PROCEDURE — 99207 PR NO CHARGE NURSE ONLY: CPT

## 2024-05-07 PROCEDURE — 96372 THER/PROPH/DIAG INJ SC/IM: CPT | Performed by: INTERNAL MEDICINE

## 2024-05-07 RX ADMIN — CYANOCOBALAMIN 1000 MCG: 1000 INJECTION, SOLUTION INTRAMUSCULAR; SUBCUTANEOUS at 09:34

## 2024-05-21 ENCOUNTER — TELEPHONE (OUTPATIENT)
Dept: FAMILY MEDICINE | Facility: CLINIC | Age: 74
End: 2024-05-21
Payer: MEDICARE

## 2024-05-21 ENCOUNTER — MYC MEDICAL ADVICE (OUTPATIENT)
Dept: FAMILY MEDICINE | Facility: CLINIC | Age: 74
End: 2024-05-21
Payer: MEDICARE

## 2024-05-21 DIAGNOSIS — F33.42 RECURRENT MAJOR DEPRESSION IN COMPLETE REMISSION (H): ICD-10-CM

## 2024-05-21 DIAGNOSIS — E11.9 TYPE 2 DIABETES MELLITUS WITHOUT COMPLICATION, WITHOUT LONG-TERM CURRENT USE OF INSULIN (H): ICD-10-CM

## 2024-05-21 RX ORDER — ATORVASTATIN CALCIUM 20 MG/1
TABLET, FILM COATED ORAL
Qty: 90 TABLET | Refills: 2 | Status: SHIPPED | OUTPATIENT
Start: 2024-05-21 | End: 2024-09-05

## 2024-05-22 RX ORDER — RISPERIDONE 1 MG/1
TABLET ORAL
Qty: 135 TABLET | Refills: 0 | OUTPATIENT
Start: 2024-05-22

## 2024-05-22 NOTE — TELEPHONE ENCOUNTER
Routing refill to provider for approval.  Son advised to schedule follow up appointment.     Last Written Prescription Date:  2/6/24  Last Fill Quantity: 135,  # refills: 0   Last office visit: 2/21/2024   Future Office Visit:   Next 5 appointments (look out 90 days)      Jun 04, 2024 10:00 AM  MA Visit with ADINA CLAIRE/LPN  [facility] ([facility] ) [address] [place] [id number] [phone]

## 2024-05-30 DIAGNOSIS — F33.42 RECURRENT MAJOR DEPRESSION IN COMPLETE REMISSION (H): ICD-10-CM

## 2024-05-30 RX ORDER — RISPERIDONE 1 MG/1
TABLET ORAL
Qty: 135 TABLET | Refills: 0 | Status: SHIPPED | OUTPATIENT
Start: 2024-05-30 | End: 2024-08-21

## 2024-06-04 ENCOUNTER — ALLIED HEALTH/NURSE VISIT (OUTPATIENT)
Dept: FAMILY MEDICINE | Facility: CLINIC | Age: 74
End: 2024-06-04
Payer: MEDICARE

## 2024-06-04 DIAGNOSIS — E53.8 VITAMIN B12 DEFICIENCY (NON ANEMIC): Primary | ICD-10-CM

## 2024-06-04 PROCEDURE — 96372 THER/PROPH/DIAG INJ SC/IM: CPT | Performed by: INTERNAL MEDICINE

## 2024-06-04 PROCEDURE — 99207 PR NO CHARGE NURSE ONLY: CPT

## 2024-06-04 RX ADMIN — CYANOCOBALAMIN 1000 MCG: 1000 INJECTION, SOLUTION INTRAMUSCULAR; SUBCUTANEOUS at 09:46

## 2024-07-02 ENCOUNTER — ALLIED HEALTH/NURSE VISIT (OUTPATIENT)
Dept: FAMILY MEDICINE | Facility: CLINIC | Age: 74
End: 2024-07-02
Payer: MEDICARE

## 2024-07-02 DIAGNOSIS — E53.8 VITAMIN B12 DEFICIENCY (NON ANEMIC): Primary | ICD-10-CM

## 2024-07-02 PROCEDURE — 99207 PR NO CHARGE NURSE ONLY: CPT

## 2024-07-02 PROCEDURE — 96372 THER/PROPH/DIAG INJ SC/IM: CPT | Performed by: INTERNAL MEDICINE

## 2024-07-02 RX ADMIN — CYANOCOBALAMIN 1000 MCG: 1000 INJECTION, SOLUTION INTRAMUSCULAR; SUBCUTANEOUS at 09:33

## 2024-07-05 DIAGNOSIS — M19.90 IDIOPATHIC OSTEOARTHRITIS: ICD-10-CM

## 2024-07-05 RX ORDER — CELECOXIB 200 MG/1
CAPSULE ORAL
Qty: 90 CAPSULE | Refills: 0 | Status: SHIPPED | OUTPATIENT
Start: 2024-07-05 | End: 2024-09-05

## 2024-07-27 ENCOUNTER — HEALTH MAINTENANCE LETTER (OUTPATIENT)
Age: 74
End: 2024-07-27

## 2024-07-31 ENCOUNTER — PATIENT OUTREACH (OUTPATIENT)
Dept: CARE COORDINATION | Facility: CLINIC | Age: 74
End: 2024-07-31
Payer: MEDICARE

## 2024-07-31 DIAGNOSIS — E53.8 VITAMIN B12 DEFICIENCY (NON ANEMIC): Primary | ICD-10-CM

## 2024-07-31 RX ORDER — CYANOCOBALAMIN 1000 UG/ML
1000 INJECTION, SOLUTION INTRAMUSCULAR; SUBCUTANEOUS
Status: ACTIVE | OUTPATIENT
Start: 2024-07-31 | End: 2025-07-26

## 2024-07-31 NOTE — PROGRESS NOTES
Patient is scheduled 8/6/24 for her B12 injection.   New orders are needed.    Last injection 7/2/24  Last B12 lab 1/6/23 (normal)    Order pended- please sign if appropriate

## 2024-08-06 ENCOUNTER — ALLIED HEALTH/NURSE VISIT (OUTPATIENT)
Dept: FAMILY MEDICINE | Facility: CLINIC | Age: 74
End: 2024-08-06
Payer: MEDICARE

## 2024-08-06 DIAGNOSIS — E53.8 VITAMIN B12 DEFICIENCY (NON ANEMIC): Primary | ICD-10-CM

## 2024-08-06 PROCEDURE — 96372 THER/PROPH/DIAG INJ SC/IM: CPT | Performed by: NURSE PRACTITIONER

## 2024-08-06 PROCEDURE — 99207 PR NO CHARGE NURSE ONLY: CPT

## 2024-08-06 RX ADMIN — CYANOCOBALAMIN 1000 MCG: 1000 INJECTION, SOLUTION INTRAMUSCULAR; SUBCUTANEOUS at 09:47

## 2024-08-20 DIAGNOSIS — F33.42 RECURRENT MAJOR DEPRESSION IN COMPLETE REMISSION (H): ICD-10-CM

## 2024-08-21 RX ORDER — RISPERIDONE 1 MG/1
TABLET ORAL
Qty: 45 TABLET | Refills: 0 | Status: SHIPPED | OUTPATIENT
Start: 2024-08-21 | End: 2024-09-05

## 2024-08-28 ENCOUNTER — TELEPHONE (OUTPATIENT)
Dept: FAMILY MEDICINE | Facility: CLINIC | Age: 74
End: 2024-08-28
Payer: MEDICARE

## 2024-08-28 NOTE — TELEPHONE ENCOUNTER
Medication Question      What medication are you calling about (include dose and sig)?: losartan (COZAAR) 50 MG tablet   metFORMIN (GLUCOPHAGE) 500 MG tablet     Preferred Pharmacy:   [facility] [address]  [address]  [facility] [address]  Phone: [phone] Fax: [phone]    Who prescribed the medication?: Ligia Mann     When did you use the medication last? metFORMIN 04/2024   losartan 02/2024    Do you have any questions or concerns?  Yes: Lv from [facility] called to inform care team that pt has not picked up meds in a few months. He is concerned why pt has not been taking either medication. Confirmed that meds have not been discontinued. Last saw pcp 2/21/24. AWV scheduled 9/5/24. Lv suggested that care team check on pt to make sure meds are being taken correctly       Could we send this information to you in [facility] or would you prefer to receive a phone call?:   Patient would prefer a phone call   Okay to leave a detailed message?: Yes at Cell number on file:    Telephone Information:   Mobile [phone]

## 2024-08-28 NOTE — TELEPHONE ENCOUNTER
RN called and spoke with son (number listed as patients).  Son states that (uncle) has been picking up meds.    Son will call (uncle) and (pharmacist).    Son states that she is taking meds (as far as he knows).

## 2024-09-05 ENCOUNTER — OFFICE VISIT (OUTPATIENT)
Dept: FAMILY MEDICINE | Facility: CLINIC | Age: 74
End: 2024-09-05
Attending: NURSE PRACTITIONER
Payer: MEDICARE

## 2024-09-05 VITALS
OXYGEN SATURATION: 96 % | HEART RATE: 68 BPM | TEMPERATURE: 98.5 F | BODY MASS INDEX: 36.1 KG/M2 | RESPIRATION RATE: 20 BRPM | DIASTOLIC BLOOD PRESSURE: 78 MMHG | WEIGHT: 257.9 LBS | HEIGHT: 71 IN | SYSTOLIC BLOOD PRESSURE: 135 MMHG

## 2024-09-05 DIAGNOSIS — I10 PRIMARY HYPERTENSION: ICD-10-CM

## 2024-09-05 DIAGNOSIS — E11.42 TYPE 2 DIABETES MELLITUS WITH DIABETIC POLYNEUROPATHY, WITHOUT LONG-TERM CURRENT USE OF INSULIN (H): Primary | ICD-10-CM

## 2024-09-05 DIAGNOSIS — F03.B4 MODERATE DEMENTIA WITH ANXIETY, UNSPECIFIED DEMENTIA TYPE (H): ICD-10-CM

## 2024-09-05 DIAGNOSIS — Z00.00 ENCOUNTER FOR MEDICARE ANNUAL WELLNESS EXAM: ICD-10-CM

## 2024-09-05 DIAGNOSIS — M19.90 IDIOPATHIC OSTEOARTHRITIS: ICD-10-CM

## 2024-09-05 DIAGNOSIS — F33.42 RECURRENT MAJOR DEPRESSION IN COMPLETE REMISSION (H): ICD-10-CM

## 2024-09-05 LAB
CHOLEST SERPL-MCNC: 140 MG/DL
FASTING STATUS PATIENT QL REPORTED: NORMAL
HBA1C MFR BLD: 6.6 % (ref 0–5.6)
HDLC SERPL-MCNC: 55 MG/DL
LDLC SERPL CALC-MCNC: 66 MG/DL
NONHDLC SERPL-MCNC: 85 MG/DL
TRIGL SERPL-MCNC: 96 MG/DL

## 2024-09-05 PROCEDURE — G0439 PPPS, SUBSEQ VISIT: HCPCS | Performed by: NURSE PRACTITIONER

## 2024-09-05 PROCEDURE — 96372 THER/PROPH/DIAG INJ SC/IM: CPT | Performed by: NURSE PRACTITIONER

## 2024-09-05 PROCEDURE — 99214 OFFICE O/P EST MOD 30 MIN: CPT | Mod: 25 | Performed by: NURSE PRACTITIONER

## 2024-09-05 PROCEDURE — 83036 HEMOGLOBIN GLYCOSYLATED A1C: CPT | Performed by: NURSE PRACTITIONER

## 2024-09-05 PROCEDURE — 36415 COLL VENOUS BLD VENIPUNCTURE: CPT | Performed by: NURSE PRACTITIONER

## 2024-09-05 PROCEDURE — 80061 LIPID PANEL: CPT | Performed by: NURSE PRACTITIONER

## 2024-09-05 RX ORDER — ATORVASTATIN CALCIUM 20 MG/1
20 TABLET, FILM COATED ORAL DAILY
Qty: 90 TABLET | Refills: 3 | Status: SHIPPED | OUTPATIENT
Start: 2024-09-05

## 2024-09-05 RX ORDER — GABAPENTIN 100 MG/1
CAPSULE ORAL
Qty: 360 CAPSULE | Refills: 3 | Status: SHIPPED | OUTPATIENT
Start: 2024-09-05

## 2024-09-05 RX ORDER — RISPERIDONE 1 MG/1
TABLET ORAL
Qty: 135 TABLET | Refills: 3 | Status: SHIPPED | OUTPATIENT
Start: 2024-09-05

## 2024-09-05 RX ORDER — LOSARTAN POTASSIUM 50 MG/1
50 TABLET ORAL DAILY
Qty: 90 TABLET | Refills: 3 | Status: SHIPPED | OUTPATIENT
Start: 2024-09-05

## 2024-09-05 RX ORDER — CELECOXIB 200 MG/1
200 CAPSULE ORAL DAILY
Qty: 90 CAPSULE | Refills: 3 | Status: SHIPPED | OUTPATIENT
Start: 2024-09-05

## 2024-09-05 RX ORDER — CITALOPRAM HYDROBROMIDE 20 MG/1
20 TABLET ORAL DAILY
Qty: 90 TABLET | Refills: 3 | Status: SHIPPED | OUTPATIENT
Start: 2024-09-05

## 2024-09-05 RX ADMIN — CYANOCOBALAMIN 1000 MCG: 1000 INJECTION, SOLUTION INTRAMUSCULAR; SUBCUTANEOUS at 11:41

## 2024-09-05 ASSESSMENT — PATIENT HEALTH QUESTIONNAIRE - PHQ9
SUM OF ALL RESPONSES TO PHQ QUESTIONS 1-9: 8
SUM OF ALL RESPONSES TO PHQ QUESTIONS 1-9: 8
10. IF YOU CHECKED OFF ANY PROBLEMS, HOW DIFFICULT HAVE THESE PROBLEMS MADE IT FOR YOU TO DO YOUR WORK, TAKE CARE OF THINGS AT HOME, OR GET ALONG WITH OTHER PEOPLE: SOMEWHAT DIFFICULT

## 2024-09-05 NOTE — PROGRESS NOTES
Preventive Care Visit  [facility]  Ligia Mann NP, Family Medicine  Sep 5, 2024      Assessment & Plan     (E11.42) Type 2 diabetes mellitus with diabetic polyneuropathy, without long-term current use of insulin (H)  (primary encounter diagnosis)  Comment:   Plan: HEMOGLOBIN A1C, Lipid panel reflex to direct         LDL Non-fasting, Albumin Random Urine         Quantitative with Creat Ratio, gabapentin         (NEURONTIN) 100 MG capsule            Has been well controlled but she stopped the Metformin a few months ago because of diarrhea. Diarrhea resolved till the last few days but now seems resolved again. Unclear if Metformin the cause but her A1C has been so good since she moved in with her brother and cooking/eating habits have changed. Has lost #50 in the last 2 years and #35 in the last 1 year.     About 1 year ago care coord/ 'help' came in and assessed the home for safety and many changes were made. Seth toilets herself usually, can use a phone when her brother might have to leave for a few hours and will eat the food that her brother prepares fore her. They both feel that she is safe in his care. She recognizes her dementia makes any socializing difficult , she prefers to stay home but is happy.    Needs labs and med refills    (Z00.00) Encounter for Medicare annual wellness exam  Comment:   Plan:     (F33.42) Recurrent major depression in complete remission (H24)  Comment:   Plan: citalopram (CELEXA) 20 MG tablet, risperiDONE         (RISPERDAL) 1 MG tablet        Controlled on these meds for years, will not change    (M19.90) Idiopathic osteoarthritis  Comment:   Plan: celecoxib (CELEBREX) 200 MG capsule        controlled    (F03.B4) Moderate dementia with anxiety, unspecified dementia type (H)  Comment:   Plan: unable to perform the clock and work recall exercises    (E11.9) Type 2 diabetes mellitus without complication, without long-term current use of insulin  "(H)  Comment:   Plan: atorvastatin (LIPITOR) 20 MG tablet            (I10) Primary hypertension  Comment:   Plan: losartan (COZAAR) 50 MG tablet        stable            BMI  Estimated body mass index is 35.97 kg/m  as calculated from the following:    Height as of this encounter: 1.803 m (5' 11\").    Weight as of this encounter: 117 kg (257 lb 14.4 oz).       Counseling  Appropriate preventive services were addressed with this patient via screening, questionnaire, or discussion as appropriate for fall prevention, nutrition, physical activity, Tobacco-use cessation, social engagement, weight loss and cognition.  Checklist reviewing preventive services available has been given to the patient.  Reviewed patient's diet, addressing concerns and/or questions.   The patient was instructed to see the dentist every 6 months.   She is at risk for psychosocial distress and has been provided with information to reduce risk.   Updated plan of care.  Patient reported difficulty with activities of daily living were addressed today.The patient was provided with written information regarding signs of hearing loss.   The patient's PHQ-9 score is consistent with mild depression. She was provided with information regarding depression.           Jessica Payne is a 74 year old, presenting for the following:    Lives with brother who is here with her today    Had an evaluation  Wellness Visit (AWV)        9/5/2024    10:20 AM   Additional Questions   Roomed by Jenn METZGER CMA   Accompanied by Misael--brother         Health Care Directive  Patient does not have a Health Care Directive or Living Will: Discussed advance care planning with patient; information given to patient to review.  Given to brother to share with Lv who is POA  HPI              9/1/2024   General Health   How would you rate your overall physical health? (!) FAIR   Feel stress (tense, anxious, or unable to sleep) Only a little      (!) STRESS CONCERN      9/1/2024 "   Nutrition   Diet: Diabetic            9/1/2024   Exercise   Days per week of moderate/strenous exercise 0 days   Average minutes spent exercising at this level 0 min      (!) EXERCISE CONCERN      9/1/2024   Social Factors   Frequency of gathering with friends or relatives Patient declined   Worry food won't last until get money to buy more No   Food not last or not have enough money for food? No   Do you have housing? (Housing is defined as stable permanent housing and does not include staying ouside in a car, in a tent, in an abandoned building, in an overnight shelter, or couch-surfing.) Yes   Are you worried about losing your housing? No   Lack of transportation? No   Unable to get utilities (heat,electricity)? No            9/1/2024   Fall Risk   Fallen 2 or more times in the past year? No   Trouble with walking or balance? No             9/1/2024   Activities of Daily Living- Home Safety   Needs help with the following daily activites Transportation    Preparing meals   Safety concerns in the home None of the above       Multiple values from one day are sorted in reverse-chronological order         9/1/2024   Dental   Dentist two times every year? (!) NO            9/1/2024   Hearing Screening   Hearing concerns? (!) TROUBLE UNDERSTANDING SPEECH ON THE TELEPHONE            9/1/2024   Driving Risk Screening   Patient/family members have concerns about driving (!) DECLINE            9/1/2024   General Alertness/Fatigue Screening   Have you been more tired than usual lately? No            9/1/2024   Urinary Incontinence Screening   Bothered by leaking urine in past 6 months No            9/1/2024   TB Screening   Were you born outside of the [place]? No          Today's PHQ-9 Score:       9/5/2024     9:51 AM   PHQ-9 SCORE   PHQ-9 Total Score MyChart 8 (Mild depression)   PHQ-9 Total Score 8         9/1/2024   Substance Use   Alcohol more than 3/day or more than 7/wk Not Applicable   Do you have a current opioid  prescription? No   How severe/bad is pain from 1 to 10? 5/10   Do you use any other substances recreationally? No        Social History     Tobacco Use    Smoking status: Former     Current packs/day: 0.00     Types: Cigarettes     Passive exposure: Past    Smokeless tobacco: Former    Tobacco comments:     quit 12-15 years ago   Vaping Use    Vaping status: Never Used   Substance Use Topics    Alcohol use: Not Currently    Drug use: Never          Mammogram Screening - Mammography discussed and declined    ASCVD Risk   The 10-year ASCVD risk score (Roberto LEONG, et al., 2019) is: 42.5%    Values used to calculate the score:      Age: 74 years      Sex: Female      Is Non- : No      Diabetic: Yes      Tobacco smoker: No      Systolic Blood Pressure: 135 mmHg      Is BP treated: Yes      HDL Cholesterol: 47 mg/dL      Total Cholesterol: 136 mg/dL            Reviewed and updated as needed this visit by Provider                    Lab work is in process  Current providers sharing in care for this patient include:  Patient Care Team:  Ligia Mann NP as PCP - General (Family Medicine)  Ligia Mann NP as Assigned PCP    The following health maintenance items are reviewed in Epic and correct as of today:  Health Maintenance   Topic Date Due    DEXA  Never done    ADVANCE CARE PLANNING  Never done    ZOSTER IMMUNIZATION (1 of 2) Never done    LUNG CANCER SCREENING  Never done    RSV VACCINE (1 - 1-dose 60+ series) Never done    DTAP/TDAP/TD IMMUNIZATION (3 - Td or Tdap) 06/24/2021    DIABETIC FOOT EXAM  01/06/2024    A1C  05/21/2024    LIPID  08/30/2024    MICROALBUMIN  08/30/2024    INFLUENZA VACCINE (1) 09/01/2024    COVID-19 Vaccine (3 - 2023-24 season) 09/01/2024    MEDICARE ANNUAL WELLNESS VISIT  08/30/2024    DEPRESSION 6 MO INDEX REPEAT PHQ-9  09/19/2024    ANNUAL REVIEW OF HM ORDERS  02/06/2025    BMP  02/21/2025    HEPATIC PANEL  02/21/2025    CBC W/DIFFERENTIAL  02/21/2025     "PHQ-9  03/05/2025    EYE EXAM  03/28/2025    FALL RISK ASSESSMENT  09/05/2025    HEPATITIS C SCREENING  Completed    DEPRESSION ACTION PLAN  Completed    Pneumococcal Vaccine: 65+ Years  Completed    HPV IMMUNIZATION  Aged Out    MENINGITIS IMMUNIZATION  Aged Out    RSV MONOCLONAL ANTIBODY  Aged Out    MAMMO SCREENING  Discontinued    COLORECTAL CANCER SCREENING  Discontinued            Objective    Exam  /78 (BP Location: Right arm, Patient Position: Sitting, Cuff Size: Adult Large)   Pulse 68   Temp 98.5  F (36.9  C) (Tympanic)   Resp 20   Ht 1.803 m (5' 11\")   Wt 117 kg (257 lb 14.4 oz)   LMP  (LMP Unknown)   SpO2 96%   BMI 35.97 kg/m     Estimated body mass index is 35.97 kg/m  as calculated from the following:    Height as of this encounter: 1.803 m (5' 11\").    Weight as of this encounter: 117 kg (257 lb 14.4 oz).    Physical Exam  GENERAL: alert and no distress  NECK: no adenopathy, no asymmetry, masses, or scars  RESP: lungs clear to auscultation - no rales, rhonchi or wheezes  CV: regular rate and rhythm, normal S1 S2, no S3 or S4, no murmur, click or rub, no peripheral edema  MS: no gross musculoskeletal defects noted, no edema  Pleasant, interactive, converses with brother and myself    Sensory exam of the foot is not normal, not tested with the monofilament as she declines but reports no lesions or ulcers         9/5/2024   Mini Cog   Mini-Cog Not Completed (choose reason) Known dementia                 Signed Electronically by: Ligia Mann NP    Answers submitted by the patient for this visit:  Patient Health Questionnaire (Submitted on 9/5/2024)  If you checked off any problems, how difficult have these problems made it for you to do your work, take care of things at home, or get along with other people?: Somewhat difficult  PHQ9 TOTAL SCORE: 8    "

## 2024-09-05 NOTE — PATIENT INSTRUCTIONS
Please bring advanced directive signed for us to scan in to your record  Patient Education   Preventive Care Advice   This is general advice given by our system to help you stay healthy. However, your care team may have specific advice just for you. Please talk to your care team about your preventive care needs.  Nutrition  Eat 5 or more servings of fruits and vegetables each day.  Try wheat bread, brown rice and whole grain pasta (instead of white bread, rice, and pasta).  Get enough calcium and vitamin D. Check the label on foods and aim for 100% of the RDA (recommended daily allowance).  Lifestyle  Exercise at least 150 minutes each week  (30 minutes a day, 5 days a week).  Do muscle strengthening activities 2 days a week. These help control your weight and prevent disease.  No smoking.  Wear sunscreen to prevent skin cancer.  Have a dental exam and cleaning every 6 months.  Yearly exams  See your health care team every year to talk about:  Any changes in your health.  Any medicines your care team has prescribed.  Preventive care, family planning, and ways to prevent chronic diseases.  Shots (vaccines)   HPV shots (up to age 26), if you've never had them before.  Hepatitis B shots (up to age 59), if you've never had them before.  COVID-19 shot: Get this shot when it's due.  Flu shot: Get a flu shot every year.  Tetanus shot: Get a tetanus shot every 10 years.  Pneumococcal, hepatitis A, and RSV shots: Ask your care team if you need these based on your risk.  Shingles shot (for age 50 and up)  General health tests  Diabetes screening:  Starting at age 35, Get screened for diabetes at least every 3 years.  If you are younger than age 35, ask your care team if you should be screened for diabetes.  Cholesterol test: At age 39, start having a cholesterol test every 5 years, or more often if advised.  Bone density scan (DEXA): At age 50, ask your care team if you should have this scan for osteoporosis (brittle  bones).  Hepatitis C: Get tested at least once in your life.  STIs (sexually transmitted infections)  Before age 24: Ask your care team if you should be screened for STIs.  After age 24: Get screened for STIs if you're at risk. You are at risk for STIs (including HIV) if:  You are sexually active with more than one person.  You don't use condoms every time.  You or a partner was diagnosed with a sexually transmitted infection.  If you are at risk for HIV, ask about PrEP medicine to prevent HIV.  Get tested for HIV at least once in your life, whether you are at risk for HIV or not.  Cancer screening tests  Cervical cancer screening: If you have a cervix, begin getting regular cervical cancer screening tests starting at age 21.  Breast cancer scan (mammogram): If you've ever had breasts, begin having regular mammograms starting at age 40. This is a scan to check for breast cancer.  Colon cancer screening: It is important to start screening for colon cancer at age 45.  Have a colonoscopy test every 10 years (or more often if you're at risk) Or, ask your provider about stool tests like a FIT test every year or Cologuard test every 3 years.  To learn more about your testing options, visit:   .  For help making a decision, visit:   https://bit.ly/hg42256.  Prostate cancer screening test: If you have a prostate, ask your care team if a prostate cancer screening test (PSA) at age 55 is right for you.  Lung cancer screening: If you are a current or former smoker ages 50 to 80, ask your care team if ongoing lung cancer screenings are right for you.  For informational purposes only. Not to replace the advice of your health care provider. Copyright   2023 [facility]. All rights reserved. Clinically reviewed by the [facility] Transitions Program. [employer] [id number] - REV 01/24.  Learning About Activities of Daily Living  What are activities of daily living?     Activities of daily living (ADLs) are the basic  self-care tasks you do every day. These include eating, bathing, dressing, and moving around.  As you age, and if you have health problems, you may find that it's harder to do some of these tasks. If so, your doctor can suggest ideas that may help.  To measure what kind of help you may need, your doctor will ask how well you are able to do ADLs. Let your doctor know if there are any tasks that you are having trouble doing. This is an important first step to getting help. And when you have the help you need, you can stay as independent as possible.  How will a doctor assess your ADLs?  Asking about ADLs is part of a routine health checkup your doctor will likely do as you age. Your health check might be done in a doctor's office, in your home, or at a hospital. The goal is to find out if you are having any problems that could make it hard to care for yourself or that make it unsafe for you to be on your own.  To measure your ADLs, your doctor will ask how hard it is for you to do routine tasks. Your doctor may also want to know if you have changed the way you do a task because of a health problem. Your doctor may watch how you:  Walk back and forth.  Keep your balance while you stand or walk.  Move from sitting to standing or from a bed to a chair.  Button or unbutton a shirt or sweater.  Remove and put on your shoes.  It's common to feel a little worried or anxious if you find you can't do all the things you used to be able to do. Talking with your doctor about ADLs is a way to make sure you're as safe as possible and able to care for yourself as well as you can. You may want to bring a caregiver, friend, or family member to your checkup. They can help you talk to your doctor.  Follow-up care is a key part of your treatment and safety. Be sure to make and go to all appointments, and call your doctor if you are having problems. It's also a good idea to know your test results and keep a list of the medicines you  take.  Current as of: October 24, 2023  Content Version: 14.1    7730-3437 [employer].   Care instructions adapted under license by your healthcare professional. If you have questions about a medical condition or this instruction, always ask your healthcare professional. [employer] disclaims any warranty or liability for your use of this information.    Hearing Loss: Care Instructions  Overview     Hearing loss is a sudden or slow decrease in how well you hear. It can range from slight to profound. Permanent hearing loss can occur with aging. It also can happen when you are exposed long-term to loud noise. Examples include listening to loud music, riding motorcycles, or being around other loud machines.  Hearing loss can affect your work and home life. It can make you feel lonely or depressed. You may feel that you have lost your independence. But hearing aids and other devices can help you hear better and feel connected to others.  Follow-up care is a key part of your treatment and safety. Be sure to make and go to all appointments, and call your doctor if you are having problems. It's also a good idea to know your test results and keep a list of the medicines you take.  How can you care for yourself at home?  Avoid loud noises whenever possible. This helps keep your hearing from getting worse.  Always wear hearing protection around loud noises.  Wear a hearing aid as directed.  A professional can help you pick a hearing aid that will work best for you.  You can also get hearing aids over the counter for mild to moderate hearing loss.  Have hearing tests as your doctor suggests. They can show whether your hearing has changed. Your hearing aid may need to be adjusted.  Use other devices as needed. These may include:  Telephone amplifiers and hearing aids that can connect to a television, stereo, radio, or microphone.  Devices that use lights or vibrations. These alert you to the doorbell,  "a ringing telephone, or a baby monitor.  Television closed-captioning. This shows the words at the bottom of the screen. Most new TVs can do this.  TTY (text telephone). This lets you type messages back and forth on the telephone instead of talking or listening. These devices are also called TDD. When messages are typed on the keyboard, they are sent over the phone line to a receiving TTY. The message is shown on a monitor.  Use text messaging, social media, and email if it is hard for you to communicate by telephone.  Try to learn a listening technique called speechreading. It is not lipreading. You pay attention to people's gestures, expressions, posture, and tone of voice. These clues can help you understand what a person is saying. Face the person you are talking to, and have them face you. Make sure the lighting is good. You need to see the other person's face clearly.  Think about counseling if you need help to adjust to your hearing loss.  When should you call for help?  Watch closely for changes in your health, and be sure to contact your doctor if:    You think your hearing is getting worse.     You have new symptoms, such as dizziness or nausea.   Where can you learn more?  Go to https://www.[employer].net/patiented  Enter R798 in the search box to learn more about \"Hearing Loss: Care Instructions.\"  Current as of: September 27, 2023               Content Version: 14.0    3353-9379 [employer].   Care instructions adapted under license by your healthcare professional. If you have questions about a medical condition or this instruction, always ask your healthcare professional. [employer] disclaims any warranty or liability for your use of this information.      Learning About Depression Screening  What is depression screening?  Depression screening is a way to see if you have depression symptoms. It may be done by a doctor or counselor. It's often part of a routine checkup. That's " "because your mental health is just as important as your physical health.  Depression is a mental health condition that affects how you feel, think, and act. You may:  Have less energy.  Lose interest in your daily activities.  Feel sad and grouchy for a long time.  Depression is very common. It affects people of all ages.  Many things can lead to depression. Some people become depressed after they have a stroke or find out they have a major illness like cancer or heart disease. The death of a loved one or a breakup may lead to depression. It can run in families. Most experts believe that a combination of inherited genes and stressful life events can cause it.  What happens during screening?  You may be asked to fill out a form about your depression symptoms. You and the doctor will discuss your answers. The doctor may ask you more questions to learn more about how you think, act, and feel.  What happens after screening?  If you have symptoms of depression, your doctor will talk to you about your options.  Doctors usually treat depression with medicines or counseling. Often, combining the two works best. Many people don't get help because they think that they'll get over the depression on their own. But people with depression may not get better unless they get treatment.  The cause of depression is not well understood. There may be many factors involved. But if you have depression, it's not your fault.  A serious symptom of depression is thinking about death or suicide. If you or someone you care about talks about this or about feeling hopeless, get help right away.  It's important to know that depression can be treated. Medicine, counseling, and self-care may help.  Where can you learn more?  Go to https://www.[employer].net/patiented  Enter T185 in the search box to learn more about \"Learning About Depression Screening.\"  Current as of: June 24, 2023  Content Version: 14.1 2006-2024 [employer]. "   Care instructions adapted under license by your healthcare professional. If you have questions about a medical condition or this instruction, always ask your healthcare professional. [employer] disclaims any warranty or liability for your use of this information.

## 2024-09-06 ENCOUNTER — APPOINTMENT (OUTPATIENT)
Dept: LAB | Facility: CLINIC | Age: 74
End: 2024-09-06
Payer: MEDICARE

## 2024-09-06 LAB
CREAT UR-MCNC: 226 MG/DL
MICROALBUMIN UR-MCNC: 23.5 MG/L
MICROALBUMIN/CREAT UR: 10.4 MG/G CR (ref 0–25)

## 2024-09-06 PROCEDURE — 82043 UR ALBUMIN QUANTITATIVE: CPT | Performed by: NURSE PRACTITIONER

## 2024-09-06 PROCEDURE — 82570 ASSAY OF URINE CREATININE: CPT | Performed by: NURSE PRACTITIONER

## 2024-09-06 NOTE — RESULT ENCOUNTER NOTE
Your hemoglobin A1c continues to be very good.  I think your diet changes which involve healthier eating and smaller  portions and a healthy weight loss are paying off and  this is helping to keep your diabetes in good control.  I do not think we need to add any medication to replace the metformin at this time given that your A1c remains acceptable.  Other lab results also look very good.  Thank you.

## 2024-10-01 ENCOUNTER — ALLIED HEALTH/NURSE VISIT (OUTPATIENT)
Dept: FAMILY MEDICINE | Facility: CLINIC | Age: 74
End: 2024-10-01
Payer: MEDICARE

## 2024-10-01 DIAGNOSIS — E53.8 VITAMIN B12 DEFICIENCY (NON ANEMIC): Primary | ICD-10-CM

## 2024-10-01 PROCEDURE — 96372 THER/PROPH/DIAG INJ SC/IM: CPT | Performed by: NURSE PRACTITIONER

## 2024-10-01 PROCEDURE — 99207 PR NO CHARGE NURSE ONLY: CPT

## 2024-10-01 RX ADMIN — CYANOCOBALAMIN 1000 MCG: 1000 INJECTION, SOLUTION INTRAMUSCULAR; SUBCUTANEOUS at 09:40

## 2024-11-05 ENCOUNTER — ALLIED HEALTH/NURSE VISIT (OUTPATIENT)
Dept: FAMILY MEDICINE | Facility: CLINIC | Age: 74
End: 2024-11-05
Payer: MEDICARE

## 2024-11-05 DIAGNOSIS — E53.8 VITAMIN B12 DEFICIENCY (NON ANEMIC): Primary | ICD-10-CM

## 2024-11-05 PROCEDURE — 99207 PR NO CHARGE NURSE ONLY: CPT

## 2024-11-05 RX ADMIN — CYANOCOBALAMIN 1000 MCG: 1000 INJECTION, SOLUTION INTRAMUSCULAR; SUBCUTANEOUS at 09:37

## 2024-12-03 ENCOUNTER — ALLIED HEALTH/NURSE VISIT (OUTPATIENT)
Dept: FAMILY MEDICINE | Facility: CLINIC | Age: 74
End: 2024-12-03
Payer: MEDICARE

## 2024-12-03 DIAGNOSIS — E53.8 VITAMIN B12 DEFICIENCY (NON ANEMIC): Primary | ICD-10-CM

## 2024-12-03 PROCEDURE — 99207 PR NO CHARGE NURSE ONLY: CPT

## 2024-12-03 RX ADMIN — CYANOCOBALAMIN 1000 MCG: 1000 INJECTION, SOLUTION INTRAMUSCULAR; SUBCUTANEOUS at 09:32

## 2024-12-14 ENCOUNTER — HEALTH MAINTENANCE LETTER (OUTPATIENT)
Age: 74
End: 2024-12-14

## 2025-01-07 ENCOUNTER — MYC MEDICAL ADVICE (OUTPATIENT)
Dept: FAMILY MEDICINE | Facility: CLINIC | Age: 75
End: 2025-01-07
Payer: MEDICARE

## 2025-01-14 ASSESSMENT — PATIENT HEALTH QUESTIONNAIRE - PHQ9
SUM OF ALL RESPONSES TO PHQ QUESTIONS 1-9: 1
SUM OF ALL RESPONSES TO PHQ QUESTIONS 1-9: 1
10. IF YOU CHECKED OFF ANY PROBLEMS, HOW DIFFICULT HAVE THESE PROBLEMS MADE IT FOR YOU TO DO YOUR WORK, TAKE CARE OF THINGS AT HOME, OR GET ALONG WITH OTHER PEOPLE: SOMEWHAT DIFFICULT

## 2025-01-15 ENCOUNTER — OFFICE VISIT (OUTPATIENT)
Dept: FAMILY MEDICINE | Facility: CLINIC | Age: 75
End: 2025-01-15
Payer: MEDICARE

## 2025-01-15 VITALS
RESPIRATION RATE: 18 BRPM | TEMPERATURE: 97.8 F | BODY MASS INDEX: 35.98 KG/M2 | WEIGHT: 257 LBS | HEART RATE: 82 BPM | DIASTOLIC BLOOD PRESSURE: 72 MMHG | OXYGEN SATURATION: 99 % | SYSTOLIC BLOOD PRESSURE: 131 MMHG | HEIGHT: 71 IN

## 2025-01-15 DIAGNOSIS — F03.B4 MODERATE DEMENTIA WITH ANXIETY, UNSPECIFIED DEMENTIA TYPE (H): ICD-10-CM

## 2025-01-15 DIAGNOSIS — E11.42 TYPE 2 DIABETES MELLITUS WITH DIABETIC POLYNEUROPATHY, WITHOUT LONG-TERM CURRENT USE OF INSULIN (H): Primary | ICD-10-CM

## 2025-01-15 DIAGNOSIS — R32 URINARY INCONTINENCE, UNSPECIFIED TYPE: ICD-10-CM

## 2025-01-15 DIAGNOSIS — Z78.0 ASYMPTOMATIC POSTMENOPAUSAL STATUS: ICD-10-CM

## 2025-01-15 PROBLEM — E66.01 MORBID OBESITY (H): Status: RESOLVED | Noted: 2022-07-08 | Resolved: 2025-01-15

## 2025-01-15 LAB
ALBUMIN UR-MCNC: NEGATIVE MG/DL
APPEARANCE UR: ABNORMAL
BACTERIA #/AREA URNS HPF: ABNORMAL /HPF
BILIRUB UR QL STRIP: NEGATIVE
COLOR UR AUTO: ABNORMAL
EST. AVERAGE GLUCOSE BLD GHB EST-MCNC: 140 MG/DL
GLUCOSE UR STRIP-MCNC: NEGATIVE MG/DL
HBA1C MFR BLD: 6.5 % (ref 0–5.6)
HGB UR QL STRIP: NEGATIVE
KETONES UR STRIP-MCNC: ABNORMAL MG/DL
LEUKOCYTE ESTERASE UR QL STRIP: ABNORMAL
MUCOUS THREADS #/AREA URNS LPF: PRESENT /LPF
NITRATE UR QL: NEGATIVE
PH UR STRIP: 6 [PH] (ref 5–7)
RBC #/AREA URNS AUTO: ABNORMAL /HPF
SP GR UR STRIP: >=1.03 (ref 1–1.03)
SQUAMOUS #/AREA URNS AUTO: ABNORMAL /LPF
UROBILINOGEN UR STRIP-ACNC: 1 E.U./DL
WBC #/AREA URNS AUTO: ABNORMAL /HPF
WBC CLUMPS #/AREA URNS HPF: PRESENT /HPF

## 2025-01-15 PROCEDURE — 99214 OFFICE O/P EST MOD 30 MIN: CPT | Performed by: NURSE PRACTITIONER

## 2025-01-15 PROCEDURE — 83036 HEMOGLOBIN GLYCOSYLATED A1C: CPT | Performed by: NURSE PRACTITIONER

## 2025-01-15 PROCEDURE — 36415 COLL VENOUS BLD VENIPUNCTURE: CPT | Performed by: NURSE PRACTITIONER

## 2025-01-15 PROCEDURE — 87086 URINE CULTURE/COLONY COUNT: CPT | Performed by: NURSE PRACTITIONER

## 2025-01-15 PROCEDURE — 81001 URINALYSIS AUTO W/SCOPE: CPT | Performed by: NURSE PRACTITIONER

## 2025-01-15 PROCEDURE — G2211 COMPLEX E/M VISIT ADD ON: HCPCS | Performed by: NURSE PRACTITIONER

## 2025-01-15 NOTE — PROGRESS NOTES
"  Assessment & Plan     (E11.42) Type 2 diabetes mellitus with diabetic polyneuropathy, without long-term current use of insulin (H)  (primary encounter diagnosis)  Comment: Stable   Plan: HEMOGLOBIN A1C, REVIEW OF HEALTH MAINTENANCE         PROTOCOL ORDERS        Continue current medication regimen. Patient deferred a monofilament foot exam today. Monitor for signs of hypo or hyperglycemia. Reassurance that her brother does not need to record her blood sugars at home as they have been stable.       (F03.B4) Moderate dementia with anxiety, unspecified dementia type (H)  Comment: Stable   Plan: Continue to monitor. Pamphlet for the ADRC was given to her brother     (R32) Urinary incontinence, unspecified type  Comment: Worsening  Plan: UA Macroscopic with reflex to Microscopic and         Culture - Lab Collect, UA Microscopic with         Reflex to Culture, Urine Culture        Pending UA, reflux to UC if abnormal. Consider ABX if necessary. Discussed scheduling voiding every hour to two hours to decrease the urge. Recommended use of a pad or pull-on briefs to wear daily in the event that she is not able to make it to the bathroom in time           BMI  Estimated body mass index is 35.84 kg/m  as calculated from the following:    Height as of this encounter: 1.803 m (5' 11\").    Weight as of this encounter: 116.6 kg (257 lb).             Jessica Payne is a 74 year old, presenting for the following health issues:  Diabetes        1/15/2025    10:31 AM   Additional Questions   Roomed by Sherin TSE     The patient presents with her brother for a follow up of Type 2 diabetes   The brother reports that her diabetes is well controlled   He denies monitoring her blood sugars at home   She is tolerating the gabapentin well     Of note, her brother mentioned that she has been experiencing several episodes of urinary and bowel incontinence since her previous visit in September  She is able to usually make it to the " "bathroom on time but occasionally she is not   Her brother notes that it tends to happen around mid day  The patient denies wearing a pad or briefs   She notes occasional pain and discomfort with urination  She denies having an itching sensation, abdominal pain, or blood in the urine    Scribed by ANU Doran         History of Present Illness       Diabetes:   She presents for follow up of diabetes.    She is not checking blood glucose.         She has no concerns regarding her diabetes at this time.   She is not experiencing numbness or burning in feet, excessive thirst, blurry vision, weight changes or redness, sores or blisters on feet.           She eats 2-3 servings of fruits and vegetables daily.She consumes 1 sweetened beverage(s) daily.She exercises with enough effort to increase her heart rate 9 or less minutes per day.  She exercises with enough effort to increase her heart rate 3 or less days per week.   She is taking medications regularly.                     Objective    /72   Pulse 82   Temp 97.8  F (36.6  C)   Resp 18   Ht 1.803 m (5' 11\")   Wt 116.6 kg (257 lb)   LMP  (LMP Unknown)   SpO2 99%   BMI 35.84 kg/m    Body mass index is 35.84 kg/m .  Physical Exam  Constitutional:       Appearance: Normal appearance.   Cardiovascular:      Rate and Rhythm: Normal rate and regular rhythm.      Pulses: Normal pulses.      Heart sounds: Normal heart sounds.   Pulmonary:      Effort: Pulmonary effort is normal.      Breath sounds: Normal breath sounds.   Skin:     General: Skin is warm and dry.      Capillary Refill: Capillary refill takes less than 2 seconds.   Neurological:      Mental Status: She is alert.   Psychiatric:         Mood and Affect: Mood normal.                    Signed Electronically by: Ligia Mann NP    "

## 2025-01-16 LAB — BACTERIA UR CULT: NORMAL

## 2025-02-17 ENCOUNTER — ALLIED HEALTH/NURSE VISIT (OUTPATIENT)
Dept: FAMILY MEDICINE | Facility: CLINIC | Age: 75
End: 2025-02-17
Payer: MEDICARE

## 2025-02-17 DIAGNOSIS — E53.8 VITAMIN B12 DEFICIENCY (NON ANEMIC): Primary | ICD-10-CM

## 2025-02-17 PROCEDURE — 99207 PR NO CHARGE NURSE ONLY: CPT

## 2025-02-17 PROCEDURE — 96372 THER/PROPH/DIAG INJ SC/IM: CPT | Performed by: NURSE PRACTITIONER

## 2025-02-17 RX ADMIN — CYANOCOBALAMIN 1000 MCG: 1000 INJECTION, SOLUTION INTRAMUSCULAR; SUBCUTANEOUS at 10:19

## 2025-02-26 ENCOUNTER — DOCUMENTATION ONLY (OUTPATIENT)
Dept: OTHER | Facility: CLINIC | Age: 75
End: 2025-02-26
Payer: MEDICARE

## 2025-02-27 ENCOUNTER — DOCUMENTATION ONLY (OUTPATIENT)
Dept: OTHER | Facility: CLINIC | Age: 75
End: 2025-02-27
Payer: MEDICARE

## 2025-03-01 ENCOUNTER — MEDICAL CORRESPONDENCE (OUTPATIENT)
Dept: HEALTH INFORMATION MANAGEMENT | Facility: CLINIC | Age: 75
End: 2025-03-01

## 2025-03-04 ENCOUNTER — TELEPHONE (OUTPATIENT)
Dept: FAMILY MEDICINE | Facility: CLINIC | Age: 75
End: 2025-03-04
Payer: MEDICARE

## 2025-03-05 DIAGNOSIS — Z53.9 DIAGNOSIS NOT YET DEFINED: Primary | ICD-10-CM

## 2025-03-05 PROCEDURE — G0180 MD CERTIFICATION HHA PATIENT: HCPCS | Performed by: INTERNAL MEDICINE

## 2025-03-06 ENCOUNTER — OFFICE VISIT (OUTPATIENT)
Dept: FAMILY MEDICINE | Facility: CLINIC | Age: 75
End: 2025-03-06
Payer: MEDICARE

## 2025-03-06 ENCOUNTER — TELEPHONE (OUTPATIENT)
Dept: CARDIOLOGY | Facility: CLINIC | Age: 75
End: 2025-03-06

## 2025-03-06 ENCOUNTER — TELEPHONE (OUTPATIENT)
Dept: FAMILY MEDICINE | Facility: CLINIC | Age: 75
End: 2025-03-06

## 2025-03-06 VITALS
DIASTOLIC BLOOD PRESSURE: 60 MMHG | HEART RATE: 58 BPM | HEIGHT: 71 IN | BODY MASS INDEX: 35.98 KG/M2 | RESPIRATION RATE: 16 BRPM | TEMPERATURE: 98 F | WEIGHT: 257 LBS | SYSTOLIC BLOOD PRESSURE: 128 MMHG | OXYGEN SATURATION: 92 %

## 2025-03-06 DIAGNOSIS — I27.20 PULMONARY HYPERTENSION (H): ICD-10-CM

## 2025-03-06 DIAGNOSIS — I26.94 MULTIPLE SUBSEGMENTAL PULMONARY EMBOLI WITHOUT ACUTE COR PULMONALE (H): Primary | ICD-10-CM

## 2025-03-06 DIAGNOSIS — Z53.9 DIAGNOSIS NOT YET DEFINED: Primary | ICD-10-CM

## 2025-03-06 PROCEDURE — G0180 MD CERTIFICATION HHA PATIENT: HCPCS | Performed by: INTERNAL MEDICINE

## 2025-03-06 RX ORDER — ASPIRIN 81 MG/1
81 TABLET ORAL
COMMUNITY
Start: 2025-02-27

## 2025-03-06 RX ORDER — CARVEDILOL 6.25 MG/1
6.25 TABLET ORAL
COMMUNITY
Start: 2025-02-27

## 2025-03-06 NOTE — TELEPHONE ENCOUNTER
[facility] Call Center    Phone Message    May a detailed message be left on voicemail: yes     Reason for Call: Appointment Intake    Referring Provider Name: Gay Weir MD in [place] FP/IM/PEDS   Diagnosis and/or Symptoms: Multiple subsegmental pulmonary emboli without acute cor pulmonale (H) [I26.94]  Pulmonary hypertension (H) [I27.20]     Please call to schedule. Lv would like to understand the main reason for being referred to the Pulmonary Hypertension.  Urgent Referral.    Action Taken: Message routed to:  Other: cardio    Travel Screening: Not Applicable     Date of Service:

## 2025-03-06 NOTE — PROGRESS NOTES
"  Assessment & Plan   Problem List Items Addressed This Visit       Multiple subsegmental pulmonary emboli without acute cor pulmonale (H) - Primary    Relevant Orders    Adult Cardiology Eval  Referral     Other Visit Diagnoses       Pulmonary hypertension (H)        Relevant Orders    Adult Cardiology Eval  Referral         History of Present Illness-  - Kerry Vazquez, 74-year-old female.  - Admitted to [facility] from February 17th to 28th for suspected heart attack, later diagnosed with blood clots in the lung.  - Discharged to a transitional care unit/skilled nursing facility.  - Currently receiving home care services on Tuesdays and Thursdays.  - Experienced shortness of breath in the hospital, now on blood thinners.  - Noted a small amount of blood in stool a couple of days ago, possibly related to blood thinners.  - Previously on metformin, discontinued due to diarrhea.     Assessment & Plan  Multiple subsegmental pulmonary emboli without acute cor pulmonale (H)  - Extensive pulmonary emboli noted. Concerns about tolerance to anticoagulation therapy.  - Urgent consult to vascular cardiology. Referral to Dr. Jimenez for evaluation of potential clot breakdown. Continue Eliquis as prescribed: 2 pills by mouth 2 times daily for five days, then one pill 2 times daily. Monitor for signs of increased bleeding.  - Risks and side effects: Eliquis may increase bleeding risk. Noted minor bleeding in stool, possibly due to blood thinners.    Pulmonary hypertension (H)  - Increased pressure in the right side of the heart due to pulmonary emboli.  - Referral to cardiology for further evaluation and management.   Refused CBC today     MED REC REQUIRED  Post Medication Reconciliation Status: discharge medications reconciled, continue medications without change  BMI  Estimated body mass index is 35.84 kg/m  as calculated from the following:    Height as of this encounter: 1.803 m (5' 11\").    " "Weight as of this encounter: 116.6 kg (257 lb).   Weight management plan: Patient was referred to their PCP to discuss a diet and exercise plan.          Subjective   Kerry is a 74 year old, presenting for the following health issues:  Hospital F/U        3/6/2025    10:44 AM   Additional Questions   Roomed by Abigail WESTON - Kerry Vazquez, 74-year-old female.  - Admitted to [facility] from February 17th to 28th for suspected heart attack, later diagnosed with blood clots in the lung.  - Discharged to a transitional care unit/skilled nursing facility.  - Currently receiving home care services on Tuesdays and Thursdays.  - Experienced shortness of breath in the hospital, now on blood thinners.  - Noted a small amount of blood in stool a couple of days ago, possibly related to blood thinners.  - Previously on metformin, discontinued due to diarrhea.                  Objective    /60 (BP Location: Right arm, Patient Position: Sitting)   Pulse 58   Temp 98  F (36.7  C) (Tympanic)   Resp 16   Ht 1.803 m (5' 11\")   Wt 116.6 kg (257 lb)   LMP  (LMP Unknown)   SpO2 92%   BMI 35.84 kg/m    Body mass index is 35.84 kg/m .  Physical Exam       Physical Exam- SKIN: Pallor.       Generalno acute distress.    HEENT: pupils are equal round and reactive to light extraocular motion is intact. Normocephalic and atraumatic.     Hearing is grossly normal and there is no otorrhea.     Chest: has bilateral rise with no increased work of breathing.    Cardiovascular: normal perfusion and brisk capillary refill.    Musculoskeletal: no gross focal abnormalities and normal gait.    Psychiatric: speech is clear and coherent and fluent. Patient dressed appropriately for the weather. Mood is appropriate and affect is full.        Discussed with patient to return to clinic if symptoms worsen or do not improve            Signed Electronically by: Gay Weir MD    "

## 2025-03-17 ENCOUNTER — ALLIED HEALTH/NURSE VISIT (OUTPATIENT)
Dept: FAMILY MEDICINE | Facility: CLINIC | Age: 75
End: 2025-03-17
Payer: MEDICARE

## 2025-03-17 VITALS — HEART RATE: 114 BPM | DIASTOLIC BLOOD PRESSURE: 82 MMHG | SYSTOLIC BLOOD PRESSURE: 133 MMHG | OXYGEN SATURATION: 93 %

## 2025-03-17 DIAGNOSIS — E53.8 VITAMIN B12 DEFICIENCY (NON ANEMIC): Primary | ICD-10-CM

## 2025-03-17 PROCEDURE — 3079F DIAST BP 80-89 MM HG: CPT

## 2025-03-17 PROCEDURE — 99207 PR NO CHARGE NURSE ONLY: CPT

## 2025-03-17 PROCEDURE — 96372 THER/PROPH/DIAG INJ SC/IM: CPT | Performed by: NURSE PRACTITIONER

## 2025-03-17 PROCEDURE — 3075F SYST BP GE 130 - 139MM HG: CPT

## 2025-03-17 RX ADMIN — CYANOCOBALAMIN 1000 MCG: 1000 INJECTION, SOLUTION INTRAMUSCULAR; SUBCUTANEOUS at 10:38

## 2025-03-24 NOTE — TELEPHONE ENCOUNTER
OPAL Awan with why she was referred to us.  Explained we wouldn't need to see her until May (3 months after initial episode) with a repeat Echo to se if she still has high pressures in her lungs.  I realizes she has Dementia, so if he has questions he can call me. Left my direct dial number for call back.  Laura Barros RN on 3/24/2025 at 10:10 AM

## 2025-03-27 DIAGNOSIS — I26.94 MULTIPLE SUBSEGMENTAL PULMONARY EMBOLI WITHOUT ACUTE COR PULMONALE (H): ICD-10-CM

## 2025-03-27 DIAGNOSIS — I10 PRIMARY HYPERTENSION: Primary | ICD-10-CM

## 2025-03-27 RX ORDER — CARVEDILOL 6.25 MG/1
6.25 TABLET ORAL 2 TIMES DAILY WITH MEALS
Qty: 180 TABLET | Refills: 0 | Status: SHIPPED | OUTPATIENT
Start: 2025-03-27

## 2025-03-27 NOTE — TELEPHONE ENCOUNTER
Medication Refill ~ Pharmacy Requested      What medication are you calling about (include dose and sig)?:     apixaban ANTICOAGULANT (ELIQUIS) 5 MG tablet  carvedilol (COREG) 6.25 MG table      [address]     [id number] [facility] [address]  Phone: [phone] Fax: [phone]    Controlled Substance Agreement on file:   CSA -- Patient Level:    CSA: None found at the patient level.       Who prescribed the medication?: Patient reported    Do you need a refill? Yes

## 2025-04-03 ENCOUNTER — OFFICE VISIT (OUTPATIENT)
Dept: FAMILY MEDICINE | Facility: CLINIC | Age: 75
End: 2025-04-03
Payer: MEDICARE

## 2025-04-03 VITALS
HEART RATE: 64 BPM | WEIGHT: 248 LBS | OXYGEN SATURATION: 97 % | RESPIRATION RATE: 20 BRPM | DIASTOLIC BLOOD PRESSURE: 82 MMHG | BODY MASS INDEX: 34.72 KG/M2 | HEIGHT: 71 IN | TEMPERATURE: 98.2 F | SYSTOLIC BLOOD PRESSURE: 127 MMHG

## 2025-04-03 DIAGNOSIS — I25.10 CORONARY ARTERY DISEASE INVOLVING NATIVE CORONARY ARTERY OF NATIVE HEART WITHOUT ANGINA PECTORIS: ICD-10-CM

## 2025-04-03 DIAGNOSIS — L89.152 PRESSURE INJURY OF SACRAL REGION, STAGE 2 (H): ICD-10-CM

## 2025-04-03 DIAGNOSIS — F03.B4 MODERATE DEMENTIA WITH ANXIETY, UNSPECIFIED DEMENTIA TYPE (H): ICD-10-CM

## 2025-04-03 DIAGNOSIS — E11.42 TYPE 2 DIABETES MELLITUS WITH DIABETIC POLYNEUROPATHY, WITHOUT LONG-TERM CURRENT USE OF INSULIN (H): ICD-10-CM

## 2025-04-03 DIAGNOSIS — I26.94 MULTIPLE SUBSEGMENTAL PULMONARY EMBOLI WITHOUT ACUTE COR PULMONALE (H): ICD-10-CM

## 2025-04-03 DIAGNOSIS — R19.7 ACUTE DIARRHEA: Primary | ICD-10-CM

## 2025-04-03 PROBLEM — L89.151 PRESSURE INJURY OF SACRAL REGION, STAGE 1: Status: ACTIVE | Noted: 2025-03-28

## 2025-04-03 RX ORDER — VITAMIN B COMPLEX
1 TABLET ORAL DAILY
COMMUNITY

## 2025-04-03 RX ORDER — CEFUROXIME AXETIL 500 MG/1
500 TABLET ORAL
COMMUNITY
Start: 2025-03-30 | End: 2025-04-03

## 2025-04-03 ASSESSMENT — ENCOUNTER SYMPTOMS: DIARRHEA: 1

## 2025-04-03 NOTE — ASSESSMENT & PLAN NOTE
Uncomplicated   Not requiring medical treatment   diabetes is improving/stable/worsening: unchanged.  Discussed dietary therapy  Diabetes will be reassessed in 6 months.

## 2025-04-03 NOTE — PROGRESS NOTES
Assessment & Plan   Problem List Items Addressed This Visit       Moderate dementia with anxiety, unspecified dementia type (H)     Rather severe dementia  Lives with her brother who is there 24 hours a day         Type 2 diabetes mellitus with diabetic polyneuropathy, without long-term current use of insulin (H)     Uncomplicated   Not requiring medical treatment   diabetes is improving/stable/worsening: unchanged.  Discussed dietary therapy  Diabetes will be reassessed in 6 months.         Multiple subsegmental pulmonary emboli without acute cor pulmonale (H)     4/3/25  -Continue Apixaban 5 mg twice daily  -Found to have multiple PEs after developing acute shortness of breath on 2/22/2025  -Associated with severe pulmonary hypertension and enlarged RV          Coronary artery disease involving native coronary artery of native heart without angina pectoris     4/3/25  -Presented after syncopal event 2/17/2025, possibly VT, with elevated troponins  -Echo EF 45% with wall motion abnormality  -CTA 2/19/2025 with severe mid RCA stenosis  -Angiogram 2/21/2025 chronic RCA occlusion with collaterals and mild disease of the LAD and left circumflex. Recommendation for medical management  -Currently EKG without significant change and troponins normal.  -Continue ASA, atorvastatin, Coreg           Other Visit Diagnoses       Acute diarrhea    -  Primary    Completing cefuroxime for urinary tract infection and pneumonitis.  Symptoms resolved.  Due to Cefuroxime, will D/C.    Relevant Orders    C. difficile Toxin B PCR with reflex to C. difficile EIA    Pressure injury of sacral region, stage 2 (H)        From when I last saw it in the hospital.  Discussed at length the need for pressure relief.  Has home health care.                MED REC REQUIRED  Post Medication Reconciliation Status: discharge medications reconciled, continue medications without change        Jessica Payne is a 74 year old, presenting for the  "following health issues:  Hospital F/U (For pneumonia,pressure wound on Right glute and cystitis at the [facility] from 3/28-3/30) and Diarrhea (Pt is c/o diarrhea since starting antibiotics)        4/3/2025    11:10 AM   Additional Questions   Roomed by Harshil HORTON   Accompanied by Brother     Diarrhea        Patient was hospitalized last week with generalized weakness and impairment.  Found of urinary tract infection and pneumonia.  Also found to have a sacral decubitus.  Acute diarrhea which began as soon as she returned home.  Spends much of the day sitting.  Chronic nocturia and incontinence.  No abdominal pain or rectal bleeding.  No nausea or vomiting.  No fever or chills.  No cough.              Review of Systems  Constitutional, HEENT, cardiovascular, pulmonary, gi and gu systems are negative, except as otherwise noted.      Objective    /82 (BP Location: Right arm, Patient Position: Sitting, Cuff Size: Adult Large)   Pulse 64   Temp 98.2  F (36.8  C) (Tympanic)   Resp 20   Ht 1.803 m (5' 11\")   Wt 112.5 kg (248 lb)   LMP  (LMP Unknown)   SpO2 97%   BMI 34.59 kg/m    Body mass index is 34.59 kg/m .  Physical Exam   Obese woman in no distress  HEENT exam reveals a moist oropharynx  Lungs clear  Cardiac exam regular, no murmur, no edema  Abdomen nontender  No CVA tenderness  Alert, disoriented and confused, cranial nerves normal, strength and gait with walker normal  Mood and affect normal              Signed Electronically by: Jameel Faulkner MD    "

## 2025-04-03 NOTE — ASSESSMENT & PLAN NOTE
4/3/25  -Continue Apixaban 5 mg twice daily  -Found to have multiple PEs after developing acute shortness of breath on 2/22/2025  -Associated with severe pulmonary hypertension and enlarged RV

## 2025-04-03 NOTE — ASSESSMENT & PLAN NOTE
4/3/25  -Presented after syncopal event 2/17/2025, possibly VT, with elevated troponins  -Echo EF 45% with wall motion abnormality  -CTA 2/19/2025 with severe mid RCA stenosis  -Angiogram 2/21/2025 chronic RCA occlusion with collaterals and mild disease of the LAD and left circumflex. Recommendation for medical management  -Currently EKG without significant change and troponins normal.  -Continue ASA, atorvastatin, Coreg

## 2025-04-09 ENCOUNTER — TELEPHONE (OUTPATIENT)
Dept: FAMILY MEDICINE | Facility: CLINIC | Age: 75
End: 2025-04-09
Payer: MEDICARE

## 2025-04-10 ENCOUNTER — MYC MEDICAL ADVICE (OUTPATIENT)
Dept: FAMILY MEDICINE | Facility: CLINIC | Age: 75
End: 2025-04-10
Payer: MEDICARE

## 2025-04-10 DIAGNOSIS — Z53.9 DIAGNOSIS NOT YET DEFINED: Primary | ICD-10-CM

## 2025-04-10 DIAGNOSIS — R19.7 POSTCHOLECYSTECTOMY DIARRHEA: Primary | ICD-10-CM

## 2025-04-10 DIAGNOSIS — K91.89 POSTCHOLECYSTECTOMY DIARRHEA: Primary | ICD-10-CM

## 2025-04-10 PROCEDURE — G0180 MD CERTIFICATION HHA PATIENT: HCPCS | Performed by: INTERNAL MEDICINE

## 2025-04-10 RX ORDER — CHOLESTYRAMINE 4 G/9G
2 POWDER, FOR SUSPENSION ORAL 2 TIMES DAILY WITH MEALS
Qty: 378 G | Refills: 5 | Status: SHIPPED | OUTPATIENT
Start: 2025-04-10

## 2025-04-10 RX ORDER — LOPERAMIDE HYDROCHLORIDE 2 MG/1
2 TABLET ORAL 4 TIMES DAILY PRN
Qty: 100 TABLET | Refills: 0 | Status: SHIPPED | OUTPATIENT
Start: 2025-04-10

## 2025-04-10 NOTE — TELEPHONE ENCOUNTER
Routing to provider to advise.    Last OV 4/3/25, with Dr. Faulkner- lab for C diff within expected range.

## 2025-04-16 ENCOUNTER — MEDICAL CORRESPONDENCE (OUTPATIENT)
Dept: HEALTH INFORMATION MANAGEMENT | Facility: CLINIC | Age: 75
End: 2025-04-16
Payer: MEDICARE

## 2025-04-21 ENCOUNTER — ALLIED HEALTH/NURSE VISIT (OUTPATIENT)
Dept: FAMILY MEDICINE | Facility: CLINIC | Age: 75
End: 2025-04-21
Payer: MEDICARE

## 2025-04-21 DIAGNOSIS — E53.8 VITAMIN B12 DEFICIENCY (NON ANEMIC): Primary | ICD-10-CM

## 2025-04-21 PROCEDURE — 96372 THER/PROPH/DIAG INJ SC/IM: CPT | Performed by: NURSE PRACTITIONER

## 2025-04-21 PROCEDURE — 99207 PR NO CHARGE NURSE ONLY: CPT

## 2025-04-21 RX ADMIN — CYANOCOBALAMIN 1000 MCG: 1000 INJECTION, SOLUTION INTRAMUSCULAR; SUBCUTANEOUS at 10:16

## 2025-04-27 ENCOUNTER — HEALTH MAINTENANCE LETTER (OUTPATIENT)
Age: 75
End: 2025-04-27

## 2025-04-30 NOTE — TELEPHONE ENCOUNTER
Spoke with son Lv who had received my VM a while back about the referral we received for his mother.  He stated that his mother just had her 6 week follow up appt with Dr. Becerra at [facility] who advised them she didn't need to see us as her repeat Echo showed her heart was functioning normally. She could also return to see her local Cardiologist.    Son states she also had a VQ scan, which I can see demonstrated multiple defects in perfusion on both Right & left lung segments.  We discussed the need for a RHC and [facility] can perform that.  We discussed the possibility of CTEPH and what possible treatments there are.  Son will reach out to Dr. Becerra and request RHC to determine if his mother needs additional testing/care.  Agreed with plan. Laura Barros RN on 4/30/2025 at 11:57 AM

## 2025-05-16 ENCOUNTER — MEDICAL CORRESPONDENCE (OUTPATIENT)
Dept: HEALTH INFORMATION MANAGEMENT | Facility: CLINIC | Age: 75
End: 2025-05-16

## 2025-05-19 ENCOUNTER — OFFICE VISIT (OUTPATIENT)
Dept: FAMILY MEDICINE | Facility: CLINIC | Age: 75
End: 2025-05-19
Payer: MEDICARE

## 2025-05-19 VITALS
WEIGHT: 243.5 LBS | OXYGEN SATURATION: 98 % | DIASTOLIC BLOOD PRESSURE: 78 MMHG | HEART RATE: 68 BPM | SYSTOLIC BLOOD PRESSURE: 122 MMHG | RESPIRATION RATE: 28 BRPM | HEIGHT: 71 IN | TEMPERATURE: 98.5 F | BODY MASS INDEX: 34.09 KG/M2

## 2025-05-19 DIAGNOSIS — K91.89 POSTCHOLECYSTECTOMY DIARRHEA: ICD-10-CM

## 2025-05-19 DIAGNOSIS — N30.00 ACUTE CYSTITIS WITHOUT HEMATURIA: Primary | ICD-10-CM

## 2025-05-19 DIAGNOSIS — R19.7 POSTCHOLECYSTECTOMY DIARRHEA: ICD-10-CM

## 2025-05-19 PROCEDURE — 3074F SYST BP LT 130 MM HG: CPT | Performed by: FAMILY MEDICINE

## 2025-05-19 PROCEDURE — 99495 TRANSJ CARE MGMT MOD F2F 14D: CPT | Performed by: FAMILY MEDICINE

## 2025-05-19 PROCEDURE — 96372 THER/PROPH/DIAG INJ SC/IM: CPT | Performed by: NURSE PRACTITIONER

## 2025-05-19 PROCEDURE — 3078F DIAST BP <80 MM HG: CPT | Performed by: FAMILY MEDICINE

## 2025-05-19 RX ORDER — CELECOXIB 200 MG/1
200 CAPSULE ORAL DAILY
COMMUNITY

## 2025-05-19 RX ORDER — CEPHALEXIN 500 MG/1
500 CAPSULE ORAL 3 TIMES DAILY
COMMUNITY
Start: 2025-05-14 | End: 2025-05-21

## 2025-05-19 RX ORDER — CHOLESTYRAMINE 4 G/9G
2 POWDER, FOR SUSPENSION ORAL 2 TIMES DAILY WITH MEALS
Qty: 378 G | Refills: 5 | Status: SHIPPED | OUTPATIENT
Start: 2025-05-19

## 2025-05-19 RX ADMIN — CYANOCOBALAMIN 1000 MCG: 1000 INJECTION, SOLUTION INTRAMUSCULAR; SUBCUTANEOUS at 10:41

## 2025-05-19 NOTE — PROGRESS NOTES
"  Assessment & Plan     Acute cystitis without hematuria  Symptoms have resolved.  She will be switching to prophylactic antibiotic.    Postcholecystectomy diarrhea  Controlled with cholestyramine, refills completed  - cholestyramine (QUESTRAN) 4 GM/DOSE powder; Take 2 g by mouth 2 times daily (with meals).        MED REC REQUIRED  Post Medication Reconciliation Status:           Jessica Payne is a 74 year old, presenting for the following health issues:  Hospital F/U ([facility] follow up---05/11/2025-05/15/2025 for UTI)        5/19/2025    10:36 AM   Additional Questions   Roomed by Jenn METZGER CMA   Accompanied by Son     HPI      Patient is here for follow-up from recent hospitalization for urinary tract infection.  She had worsening of her dementia at the time.  She is now back to baseline.    Hospital Follow-up Visit:    Hospital/Nursing Home/ Rehab Facility: [facility]  Date of Admission: 05/11/2025  Date of Discharge: 05/15/2025  Reason(s) for Admission: UTI; weakness  Was the patient in the ICU or did the patient experience delirium during hospitalization?  No  Do you have any other stressors you would like to discuss with your provider? No    Problems taking medications regularly:  None  Medication changes since discharge: added cephalexin 500 mg tid x 7 days; cephalexin 250 mg daily indefinitely  Problems adhering to non-medication therapy:  None    Summary of hospitalization:  CareEverywhere information obtained and reviewed  Diagnostic Tests/Treatments reviewed.  Follow up needed: none  Other Healthcare Providers Involved in Patient s Care:         home PT  Update since discharge: improved.         Plan of care communicated with patient and family                       Objective    /78 (BP Location: Right arm, Patient Position: Sitting, Cuff Size: Adult Large)   Pulse 68   Temp 98.5  F (36.9  C) (Tympanic)   Resp 28   Ht 1.803 m (5' 11\")   Wt 110.5 kg (243 lb 8 oz)   LMP  (LMP Unknown)   " SpO2 98%   BMI 33.96 kg/m    Body mass index is 33.96 kg/m .  Physical Exam   GENERAL: alert and no distress  RESP: lungs clear to auscultation - no rales, rhonchi or wheezes  CV: regular rate and rhythm, normal S1 S2, no S3 or S4, no murmur, click or rub, no peripheral edema  MS: no gross musculoskeletal defects noted, no edema            Signed Electronically by: Jamarcus Rutherford MD

## 2025-05-22 DIAGNOSIS — Z53.9 DIAGNOSIS NOT YET DEFINED: Primary | ICD-10-CM

## 2025-05-22 PROCEDURE — G0180 MD CERTIFICATION HHA PATIENT: HCPCS | Performed by: INTERNAL MEDICINE

## 2025-06-16 ENCOUNTER — ALLIED HEALTH/NURSE VISIT (OUTPATIENT)
Dept: FAMILY MEDICINE | Facility: CLINIC | Age: 75
End: 2025-06-16
Payer: MEDICARE

## 2025-06-16 DIAGNOSIS — E53.8 VITAMIN B12 DEFICIENCY (NON ANEMIC): Primary | ICD-10-CM

## 2025-06-16 PROCEDURE — 99207 PR NO CHARGE NURSE ONLY: CPT

## 2025-06-16 PROCEDURE — 96372 THER/PROPH/DIAG INJ SC/IM: CPT | Performed by: NURSE PRACTITIONER

## 2025-06-16 RX ADMIN — CYANOCOBALAMIN 1000 MCG: 1000 INJECTION, SOLUTION INTRAMUSCULAR; SUBCUTANEOUS at 08:49

## 2025-07-03 ENCOUNTER — TELEPHONE (OUTPATIENT)
Dept: FAMILY MEDICINE | Facility: CLINIC | Age: 75
End: 2025-07-03
Payer: MEDICARE

## 2025-07-03 DIAGNOSIS — N39.0 RECURRENT UTI: Primary | ICD-10-CM

## 2025-07-03 NOTE — TELEPHONE ENCOUNTER
"Brother Douglas \"Ana\" returning call to confirm the Pt is taking the medication daily. Told Brother we do not have a consent to communicate on file to be able to speak with him and that we also cannot release any information to him.     Pt disconnected call while writer was speaking with PADMINI Powell. Sherry suggested writer forward this request directly to Ligia Mann.   "

## 2025-07-03 NOTE — TELEPHONE ENCOUNTER
General Call      Reason for Call:  Pharmacy calling requesting that we call the Caregiver (Ana) of patient and see if the pt is still taking cephALEXin (KEFLEX) 250 MG capsule  for preventative UTI.     I made one attempt to reach out to Caregiver, left message to call back to clarify if patient is still taking this medication.     What are your questions or concerns:  Yes or No refill needed     Date of last appointment with provider: 05/19/2025    Could we send this information to you in [employer] or would you prefer to receive a phone call?:   Patient would prefer a phone call   Okay to leave a detailed message?: Yes at Other phone number:  Ana (caregiver [phone])

## 2025-07-03 NOTE — TELEPHONE ENCOUNTER
Called brother back to let him know we acknowledge that Pt is taking medication daily and passed that information on to Ligia Mann.

## 2025-07-08 ENCOUNTER — MEDICAL CORRESPONDENCE (OUTPATIENT)
Dept: HEALTH INFORMATION MANAGEMENT | Facility: CLINIC | Age: 75
End: 2025-07-08
Payer: MEDICARE

## 2025-07-14 ENCOUNTER — ALLIED HEALTH/NURSE VISIT (OUTPATIENT)
Dept: FAMILY MEDICINE | Facility: CLINIC | Age: 75
End: 2025-07-14
Payer: MEDICARE

## 2025-07-14 DIAGNOSIS — E53.8 VITAMIN B12 DEFICIENCY (NON ANEMIC): Primary | ICD-10-CM

## 2025-07-14 PROCEDURE — 99207 PR NO CHARGE NURSE ONLY: CPT

## 2025-07-14 PROCEDURE — 96372 THER/PROPH/DIAG INJ SC/IM: CPT | Performed by: NURSE PRACTITIONER

## 2025-07-14 RX ADMIN — CYANOCOBALAMIN 1000 MCG: 1000 INJECTION, SOLUTION INTRAMUSCULAR; SUBCUTANEOUS at 09:33

## 2025-07-17 DIAGNOSIS — I10 PRIMARY HYPERTENSION: ICD-10-CM

## 2025-07-17 RX ORDER — CARVEDILOL 6.25 MG/1
TABLET ORAL
Qty: 180 TABLET | Refills: 1 | Status: SHIPPED | OUTPATIENT
Start: 2025-07-17

## 2025-08-05 ENCOUNTER — TELEPHONE (OUTPATIENT)
Dept: ANTICOAGULATION | Facility: CLINIC | Age: 75
End: 2025-08-05
Payer: MEDICARE

## 2025-08-06 ENCOUNTER — PATIENT OUTREACH (OUTPATIENT)
Dept: CARE COORDINATION | Facility: CLINIC | Age: 75
End: 2025-08-06
Payer: MEDICARE

## 2025-08-10 ENCOUNTER — HEALTH MAINTENANCE LETTER (OUTPATIENT)
Age: 75
End: 2025-08-10

## 2025-08-11 ENCOUNTER — ALLIED HEALTH/NURSE VISIT (OUTPATIENT)
Dept: FAMILY MEDICINE | Facility: CLINIC | Age: 75
End: 2025-08-11
Payer: MEDICARE

## 2025-08-11 ENCOUNTER — TELEPHONE (OUTPATIENT)
Dept: FAMILY MEDICINE | Facility: CLINIC | Age: 75
End: 2025-08-11

## 2025-08-11 DIAGNOSIS — E53.8 VITAMIN B12 DEFICIENCY (NON ANEMIC): Primary | ICD-10-CM

## 2025-08-11 PROCEDURE — 96372 THER/PROPH/DIAG INJ SC/IM: CPT | Performed by: NURSE PRACTITIONER

## 2025-08-11 PROCEDURE — 99207 PR NO CHARGE NURSE ONLY: CPT

## 2025-08-11 RX ORDER — CYANOCOBALAMIN 1000 UG/ML
1000 INJECTION, SOLUTION INTRAMUSCULAR; SUBCUTANEOUS
Status: ACTIVE | OUTPATIENT
Start: 2025-08-11 | End: 2026-08-06

## 2025-08-11 RX ADMIN — CYANOCOBALAMIN 1000 MCG: 1000 INJECTION, SOLUTION INTRAMUSCULAR; SUBCUTANEOUS at 14:11
